# Patient Record
Sex: MALE | Race: BLACK OR AFRICAN AMERICAN | Employment: OTHER | URBAN - METROPOLITAN AREA
[De-identification: names, ages, dates, MRNs, and addresses within clinical notes are randomized per-mention and may not be internally consistent; named-entity substitution may affect disease eponyms.]

---

## 2017-04-11 ENCOUNTER — HOSPITAL ENCOUNTER (OUTPATIENT)
Dept: GENERAL RADIOLOGY | Age: 47
Discharge: HOME | End: 2017-04-11
Payer: COMMERCIAL

## 2017-04-11 ENCOUNTER — HOSPITAL ENCOUNTER (OUTPATIENT)
Age: 47
Discharge: HOME | End: 2017-04-11
Payer: COMMERCIAL

## 2017-04-11 DIAGNOSIS — R52 PAIN: ICD-10-CM

## 2017-04-11 PROCEDURE — 73030 X-RAY EXAM OF SHOULDER: CPT

## 2017-04-11 PROCEDURE — 72040 X-RAY EXAM NECK SPINE 2-3 VW: CPT

## 2018-01-19 ENCOUNTER — HOSPITAL ENCOUNTER (EMERGENCY)
Age: 48
Discharge: HOME | End: 2018-01-19
Attending: EMERGENCY MEDICINE
Payer: COMMERCIAL

## 2018-01-19 ENCOUNTER — APPOINTMENT (OUTPATIENT)
Dept: GENERAL RADIOLOGY | Age: 48
End: 2018-01-19
Payer: COMMERCIAL

## 2018-01-19 VITALS
OXYGEN SATURATION: 100 % | TEMPERATURE: 98 F | BODY MASS INDEX: 27.7 KG/M2 | WEIGHT: 187 LBS | DIASTOLIC BLOOD PRESSURE: 90 MMHG | HEART RATE: 88 BPM | RESPIRATION RATE: 19 BRPM | HEIGHT: 69 IN | SYSTOLIC BLOOD PRESSURE: 124 MMHG

## 2018-01-19 DIAGNOSIS — Z43.1 ATTENTION TO G-TUBE (HCC): Primary | ICD-10-CM

## 2018-01-19 PROCEDURE — 74018 RADEX ABDOMEN 1 VIEW: CPT

## 2018-01-19 PROCEDURE — 43760 HC REPLACE G-TUBE: CPT

## 2018-01-19 PROCEDURE — 99283 EMERGENCY DEPT VISIT LOW MDM: CPT

## 2018-01-19 PROCEDURE — 6360000004 HC RX CONTRAST MEDICATION: Performed by: STUDENT IN AN ORGANIZED HEALTH CARE EDUCATION/TRAINING PROGRAM

## 2018-01-19 RX ADMIN — DIATRIZOATE MEGLUMINE AND DIATRIZOATE SODIUM 30 ML: 660; 100 LIQUID ORAL; RECTAL at 03:13

## 2018-01-19 ASSESSMENT — ENCOUNTER SYMPTOMS
NAUSEA: 0
SINUS PRESSURE: 0
PHOTOPHOBIA: 0
CHEST TIGHTNESS: 0
COUGH: 0
CONSTIPATION: 0
COLOR CHANGE: 0
SINUS PAIN: 0
BLOOD IN STOOL: 0
SORE THROAT: 0
CHOKING: 0
ABDOMINAL DISTENTION: 0
DIARRHEA: 0
WHEEZING: 0
VOMITING: 0
VOICE CHANGE: 0
ABDOMINAL PAIN: 0
SHORTNESS OF BREATH: 0
TROUBLE SWALLOWING: 0

## 2018-01-19 NOTE — ED PROVIDER NOTES
[facility] ED  Emergency Department Encounter  Emergency Medicine Resident     Pt Name: Marcos Banks  MRN: [id number]  [place] 1970  Date of evaluation: 1/19/18  PCP:  No primary care provider on file. Chief Complaint     Chief Complaint   Patient presents with    G Tube Complications     pulled g tube out    Alcohol Intoxication       History of present illness (HPI)  (Location/Symptom, Timing/Onset, Context/Setting, Quality, Duration, Modifying Factors, Severity.)      Marcos Banks is a 52 y.o. male who presented to the emergency department After reportedly finding his G-tube pulled out. Patient denies knowledge of how that happened but stated it did happen within the last hour. Past medical / surgical/ social/ family history      Past Medical Hx:    has a past medical history of Cancer ([facility] [address] Past Surgical Hx:   has a past surgical history that includes Mandible reconstruction. Social Hx:  Social History     Social History    Marital status: Single     Spouse name: N/A    Number of children: N/A    Years of education: N/A     Occupational History    Not on file. Social History Main Topics    Smoking status: Former Smoker     Packs/day: 0.25     Years: 15.00     Quit date: 1/1/2005    Smokeless tobacco: Not on file    Alcohol use Yes      Comment: weekly    Drug use: No      Comment: Past hx    Sexual activity: Yes     Partners: Female     Other Topics Concern    Not on file     Social History Narrative    No narrative on file     Family Hx:  Family History   Problem Relation Age of Onset    High Blood Pressure Mother     Heart Disease Father      Allergies:    No known medication allergies    Review of systems  (2-9 systems for level 4, 10 or more for level 5)      Review of Systems   Constitutional: Negative for activity change, appetite change, fatigue and fever.    HENT: Negative for congestion, sinus pain, sinus pressure, sneezing, sore throat,

## 2018-01-19 NOTE — ED NOTES
Dr. Anusha Patel inserted 18 fr gtube without difficulty. Pt tolerated well.      Eileen Suresh RN  01/19/18 8506

## 2018-01-19 NOTE — ED NOTES
Pt presented to ED with family for the complaint of G tube complication. Pt removed g tube. Pt etoh intoxicated. Pt alert and oriented x 4. RR even and non labored. Pt denies pain.       James Castrejon RN  01/19/18 2257

## 2018-01-19 NOTE — ED PROVIDER NOTES
[facility] ED     Emergency Department     Faculty Attestation        I performed a history and physical examination of the patient and discussed management with the resident. I reviewed the residents note and agree with the documented findings and plan of care. Any areas of disagreement are noted on the chart. I was personally present for the key portions of any procedures. I have documented in the chart those procedures where I was not present during the key portions. I have reviewed the emergency nurses triage note. I agree with the chief complaint, past medical history, past surgical history, allergies, medications, social and family history as documented unless otherwise noted below. For mid-level providers such as nurse practitioners as well as physicians assistants:    I have personally seen and evaluated the patient. I find the patient's history and physical exam are consistent with NP/PA documentation. I agree with the care provided, treatment rendered, disposition, & follow-up plan. Additional findings are as noted. Vital Signs: BP (!) 124/90   Pulse 88   Temp 98 °F (36.7 °C)   Resp 19   Ht 5' 9\" (1.753 m)   Wt 187 lb (84.8 kg)   SpO2 100%   BMI 27.62 kg/m²   PCP:  No primary care provider on file. Pertinent Comments:     G-tube pulled out. This happened approximately an hour prior to arrival.  [facility] replace. Critical Care  None         Note, if the patient's blood pressure was elevated, and they have no history of hypertension, they were informed of the following: The patient may have Pre-hypertension/Hypertension: The patient has been informed that they may have pre-hypertension or Hypertension based on a blood pressure reading in the emergency department.  I recommend that the patient call the primary care provider listed on their discharge instructions or a physician of their choice this week to arrange follow up for further evaluation of possible pre-hypertension or Hypertension. (Please note that portions of this note were completed with a voice recognition program.  Efforts were made to edit the dictations but occasionally words are mis-transcribed. )    Uriel Villagran MD  Attending Emergency Medicine Physician              Mago Rodriges MD  01/19/18 8827

## 2018-03-07 ENCOUNTER — HOSPITAL ENCOUNTER (OUTPATIENT)
Dept: WOMENS IMAGING | Age: 48
Discharge: HOME | End: 2018-03-09
Payer: COMMERCIAL

## 2018-03-07 DIAGNOSIS — N63.0 LUMP OF BREAST: ICD-10-CM

## 2018-03-07 DIAGNOSIS — R92.8 ABNORMAL MAMMOGRAM: ICD-10-CM

## 2018-03-07 PROCEDURE — 76642 ULTRASOUND BREAST LIMITED: CPT

## 2018-03-07 PROCEDURE — 77066 DX MAMMO INCL CAD BI: CPT

## 2018-03-07 PROCEDURE — G0204 DX MAMMO INCL CAD BI: HCPCS

## 2018-09-18 ENCOUNTER — HOSPITAL ENCOUNTER (OUTPATIENT)
Age: 48
Discharge: HOME | End: 2018-09-18
Payer: COMMERCIAL

## 2018-09-18 LAB
-: NORMAL
ABSOLUTE EOS #: <0.03 K/UL (ref 0–0.44)
ABSOLUTE IMMATURE GRANULOCYTE: <0.03 K/UL (ref 0–0.3)
ABSOLUTE LYMPH #: 0.93 K/UL (ref 1.1–3.7)
ABSOLUTE MONO #: 0.29 K/UL (ref 0.1–1.2)
ALBUMIN SERPL-MCNC: 3.9 G/DL (ref 3.5–5.2)
ALBUMIN/GLOBULIN RATIO: 1.3 (ref 1–2.5)
ALP BLD-CCNC: 90 U/L (ref 40–129)
ALT SERPL-CCNC: 14 U/L (ref 5–41)
AMORPHOUS: NORMAL
ANION GAP SERPL CALCULATED.3IONS-SCNC: 14 MMOL/L (ref 9–17)
AST SERPL-CCNC: 25 U/L
BACTERIA: NORMAL
BASOPHILS # BLD: 1 % (ref 0–2)
BASOPHILS ABSOLUTE: <0.03 K/UL (ref 0–0.2)
BILIRUB SERPL-MCNC: 0.5 MG/DL (ref 0.3–1.2)
BILIRUBIN URINE: NEGATIVE
BUN BLDV-MCNC: 12 MG/DL (ref 6–20)
BUN/CREAT BLD: ABNORMAL (ref 9–20)
CA 125: 8 U/ML
CA 19-9: 1 U/ML (ref 0–35)
CALCIUM SERPL-MCNC: 9.3 MG/DL (ref 8.6–10.4)
CARCINOEMBRYONIC ANTIGEN: 5.8 NG/ML
CASTS UA: NORMAL /LPF (ref 0–8)
CHLORIDE BLD-SCNC: 103 MMOL/L (ref 98–107)
CHOLESTEROL/HDL RATIO: 3.6
CHOLESTEROL: 134 MG/DL
CO2: 22 MMOL/L (ref 20–31)
COLOR: YELLOW
COMMENT UA: ABNORMAL
CREAT SERPL-MCNC: 0.52 MG/DL (ref 0.7–1.2)
CRYSTALS, UA: NORMAL /HPF
DIFFERENTIAL TYPE: ABNORMAL
EOSINOPHILS RELATIVE PERCENT: 0 % (ref 1–4)
EPITHELIAL CELLS UA: NORMAL /HPF (ref 0–5)
ESTIMATED AVERAGE GLUCOSE: 91 MG/DL
GFR AFRICAN AMERICAN: >60 ML/MIN
GFR NON-AFRICAN AMERICAN: >60 ML/MIN
GFR SERPL CREATININE-BSD FRML MDRD: ABNORMAL ML/MIN/{1.73_M2}
GFR SERPL CREATININE-BSD FRML MDRD: ABNORMAL ML/MIN/{1.73_M2}
GLUCOSE BLD-MCNC: 89 MG/DL (ref 70–99)
GLUCOSE URINE: NEGATIVE
HBA1C MFR BLD: 4.8 % (ref 4–6)
HCT VFR BLD CALC: 44.8 % (ref 40.7–50.3)
HDLC SERPL-MCNC: 37 MG/DL
HEMOGLOBIN: 14.3 G/DL (ref 13–17)
HIV AG/AB: NONREACTIVE
IMMATURE GRANULOCYTES: 0 %
KETONES, URINE: NEGATIVE
LDL CHOLESTEROL: 77 MG/DL (ref 0–130)
LEUKOCYTE ESTERASE, URINE: NEGATIVE
LYMPHOCYTES # BLD: 25 % (ref 24–43)
MCH RBC QN AUTO: 32.9 PG (ref 25.2–33.5)
MCHC RBC AUTO-ENTMCNC: 31.9 G/DL (ref 28.4–34.8)
MCV RBC AUTO: 103.2 FL (ref 82.6–102.9)
MONOCYTES # BLD: 8 % (ref 3–12)
MUCUS: NORMAL
NITRITE, URINE: NEGATIVE
NRBC AUTOMATED: 0 PER 100 WBC
OTHER OBSERVATIONS UA: NORMAL
PDW BLD-RTO: 11.1 % (ref 11.8–14.4)
PH UA: >9 (ref 5–8)
PLATELET # BLD: 239 K/UL (ref 138–453)
PLATELET ESTIMATE: ABNORMAL
PMV BLD AUTO: 10.5 FL (ref 8.1–13.5)
POTASSIUM SERPL-SCNC: 4.3 MMOL/L (ref 3.7–5.3)
PROSTATE SPECIFIC ANTIGEN: 1.35 UG/L
PROTEIN UA: ABNORMAL
RBC # BLD: 4.34 M/UL (ref 4.21–5.77)
RBC # BLD: ABNORMAL 10*6/UL
RBC UA: NORMAL /HPF (ref 0–4)
RENAL EPITHELIAL, UA: NORMAL /HPF
SEG NEUTROPHILS: 66 % (ref 36–65)
SEGMENTED NEUTROPHILS ABSOLUTE COUNT: 2.45 K/UL (ref 1.5–8.1)
SODIUM BLD-SCNC: 139 MMOL/L (ref 135–144)
SPECIFIC GRAVITY UA: 1.02 (ref 1–1.03)
T. PALLIDUM, IGG: NONREACTIVE
THYROXINE, FREE: 1.1 NG/DL (ref 0.93–1.7)
TOTAL PROTEIN: 7 G/DL (ref 6.4–8.3)
TRICHOMONAS: NORMAL
TRIGL SERPL-MCNC: 102 MG/DL
TSH SERPL DL<=0.05 MIU/L-ACNC: 0.9 MIU/L (ref 0.3–5)
TURBIDITY: CLEAR
URINE HGB: NEGATIVE
UROBILINOGEN, URINE: NORMAL
VITAMIN D 25-HYDROXY: 30.3 NG/ML (ref 30–100)
VLDLC SERPL CALC-MCNC: ABNORMAL MG/DL (ref 1–30)
WBC # BLD: 3.7 K/UL (ref 3.5–11.3)
WBC # BLD: ABNORMAL 10*3/UL
WBC UA: NORMAL /HPF (ref 0–5)
YEAST: NORMAL

## 2018-09-18 PROCEDURE — 86304 IMMUNOASSAY TUMOR CA 125: CPT

## 2018-09-18 PROCEDURE — 87491 CHLMYD TRACH DNA AMP PROBE: CPT

## 2018-09-18 PROCEDURE — 86780 TREPONEMA PALLIDUM: CPT

## 2018-09-18 PROCEDURE — 87389 HIV-1 AG W/HIV-1&-2 AB AG IA: CPT

## 2018-09-18 PROCEDURE — 81001 URINALYSIS AUTO W/SCOPE: CPT

## 2018-09-18 PROCEDURE — 80053 COMPREHEN METABOLIC PANEL: CPT

## 2018-09-18 PROCEDURE — 80074 ACUTE HEPATITIS PANEL: CPT

## 2018-09-18 PROCEDURE — 82378 CARCINOEMBRYONIC ANTIGEN: CPT

## 2018-09-18 PROCEDURE — 84443 ASSAY THYROID STIM HORMONE: CPT

## 2018-09-18 PROCEDURE — 86301 IMMUNOASSAY TUMOR CA 19-9: CPT

## 2018-09-18 PROCEDURE — 83036 HEMOGLOBIN GLYCOSYLATED A1C: CPT

## 2018-09-18 PROCEDURE — 84439 ASSAY OF FREE THYROXINE: CPT

## 2018-09-18 PROCEDURE — 36415 COLL VENOUS BLD VENIPUNCTURE: CPT

## 2018-09-18 PROCEDURE — 82306 VITAMIN D 25 HYDROXY: CPT

## 2018-09-18 PROCEDURE — 80061 LIPID PANEL: CPT

## 2018-09-18 PROCEDURE — 86140 C-REACTIVE PROTEIN: CPT

## 2018-09-18 PROCEDURE — 85025 COMPLETE CBC W/AUTO DIFF WBC: CPT

## 2018-09-18 PROCEDURE — G0103 PSA SCREENING: HCPCS

## 2018-09-18 PROCEDURE — 87591 N.GONORRHOEAE DNA AMP PROB: CPT

## 2018-09-19 LAB
C-REACTIVE PROTEIN: 3.1 MG/L (ref 0–5)
C. TRACHOMATIS DNA ,URINE: NEGATIVE
HAV IGM SER IA-ACNC: ABNORMAL
HEPATITIS B CORE IGM ANTIBODY: NONREACTIVE
HEPATITIS B SURFACE ANTIGEN: NONREACTIVE
HEPATITIS C ANTIBODY: NONREACTIVE
N. GONORRHOEAE DNA, URINE: NEGATIVE

## 2018-09-21 LAB
SEND OUT REPORT: NORMAL
TEST NAME: NORMAL

## 2018-10-17 ENCOUNTER — TELEPHONE (OUTPATIENT)
Dept: GASTROENTEROLOGY | Age: 48
End: 2018-10-17

## 2018-10-18 NOTE — TELEPHONE ENCOUNTER
Call patient , to let him know that we can not see him for Screening Colonoscopy due to his age, patient states he has abnormal lab and abd. Pain. I call his PCP office at [facility] to get new referral . L/m . With MA.    He has appt with Dr. Audra Alarcon 11/19/18

## 2018-11-04 ENCOUNTER — HOSPITAL ENCOUNTER (EMERGENCY)
Age: 48
Discharge: HOME | End: 2018-11-04
Attending: EMERGENCY MEDICINE
Payer: COMMERCIAL

## 2018-11-04 VITALS
WEIGHT: 170 LBS | HEIGHT: 68 IN | SYSTOLIC BLOOD PRESSURE: 96 MMHG | RESPIRATION RATE: 16 BRPM | HEART RATE: 98 BPM | OXYGEN SATURATION: 98 % | TEMPERATURE: 97.3 F | BODY MASS INDEX: 25.76 KG/M2 | DIASTOLIC BLOOD PRESSURE: 59 MMHG

## 2018-11-04 DIAGNOSIS — S76.212A STRAIN OF ADDUCTOR MUSCLE, FASCIA AND TENDON OF LEFT THIGH, INITIAL ENCOUNTER: Primary | ICD-10-CM

## 2018-11-04 PROCEDURE — 99283 EMERGENCY DEPT VISIT LOW MDM: CPT

## 2018-11-04 RX ORDER — CYCLOBENZAPRINE HCL 10 MG
10 TABLET ORAL 3 TIMES DAILY PRN
Qty: 21 TABLET | Refills: 0 | Status: SHIPPED | OUTPATIENT
Start: 2018-11-04 | End: 2018-11-11

## 2018-11-04 RX ORDER — CYCLOBENZAPRINE HCL 10 MG
10 TABLET ORAL 3 TIMES DAILY PRN
Status: DISCONTINUED | OUTPATIENT
Start: 2018-11-04 | End: 2018-11-04

## 2018-11-04 ASSESSMENT — ENCOUNTER SYMPTOMS
ABDOMINAL PAIN: 0
DIARRHEA: 0
HEARTBURN: 0
NAUSEA: 0
VOMITING: 0
BACK PAIN: 0
HEMOPTYSIS: 0
CONSTIPATION: 0
SPUTUM PRODUCTION: 0
BLURRED VISION: 0
PHOTOPHOBIA: 0
COUGH: 0
DOUBLE VISION: 0

## 2018-11-04 ASSESSMENT — PAIN SCALES - GENERAL: PAINLEVEL_OUTOF10: 8

## 2018-11-04 ASSESSMENT — PAIN DESCRIPTION - LOCATION: LOCATION: LEG

## 2018-11-04 ASSESSMENT — PAIN DESCRIPTION - ORIENTATION: ORIENTATION: LEFT;UPPER

## 2018-11-04 ASSESSMENT — PAIN DESCRIPTION - PAIN TYPE: TYPE: ACUTE PAIN

## 2018-11-04 NOTE — ED PROVIDER NOTES
[facility] [facility]     Emergency Department     Faculty Attestation    I performed a history and physical examination of the patient and discussed management with the resident. I reviewed the residents note and agree with the documented findings and plan of care. Any areas of disagreement are noted on the chart. I was personally present for the key portions of any procedures. I have documented in the chart those procedures where I was not present during the key portions. I have reviewed the emergency nurses triage note. I agree with the chief complaint, past medical history, past surgical history, allergies, medications, social and family history as documented unless otherwise noted below. For Physician Assistant/ Nurse Practitioner cases/documentation I have personally evaluated this patient and have completed at least one if not all key elements of the E/M (history, physical exam, and MDM). Additional findings are as noted. Patient presents complaining of pain to his medial thigh that he has had for the past 4 or 5 days. He denies any specific injuries to the area. He denies any pain to the scrotum. He denies fever, chills, abdominal pain, nausea, vomiting. He denies any difficulties using the restroom. Patient denies any back pain. 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 Patient says the pain is worse with certain movements of the legs and at times the pain will shoot down the leg. He denies weakness or numbness to the leg. On exam, patient is resting completely the bed and appears well. Abdomen is soft and nontender. There is no tenderness to the scrotum. There is mild tenderness to palpation of the superior left medial thigh. No deformity, swelling, erythema, warmth, rash. Strength and sensation is intact to the lower extremity. Pulses are intact. I do not feel that imaging is indicated at this time.   We'll
interpreted by the Emergency Department Physician who either signs or Co-signs this chart in the absence of a cardiologist.  Not done    RADIOLOGY:   I directly visualized the following  images and reviewed the radiologist interpretations:  No orders to display           ED BEDSIDE ULTRASOUND:   none    LABS:  Labs Reviewed - No data to display    none      EMERGENCY DEPARTMENT COURSE:   Vitals:    Vitals:    11/04/18 0849 11/04/18 0854   BP: (!) 96/59    Pulse: 98    Resp: 16    Temp: 97.3 °F (36.3 °C)    SpO2: 98%    Weight:  170 lb (77.1 kg)   Height:  5' 8\" (1.727 m)         CRITICAL CARE:  none    CONSULTS:  None      PROCEDURES:  Procedures      FINAL IMPRESSION      1.  Strain of adductor muscle, fascia and tendon of left thigh, initial encounter            DISPOSITION/PLAN   DISPOSITION Decision To Discharge 11/04/2018 09:21:27 AM          PATIENT REFERRED TO:  [facility] [place] [facility] ED  [address] [place] [address]  [phone]    If symptoms worsen      DISCHARGE MEDICATIONS:  New Prescriptions    CYCLOBENZAPRINE (FLEXERIL) 10 MG TABLET    Take 1 tablet by mouth 3 times daily as needed for Muscle spasms       (Please note that portions of this note were completed with a voice recognition program.  Efforts were made to edit the dictations but occasionally words are mis-transcribed.)    Blanca Cuevas  Emergency Medicine Resident           Blacna Cuevas MD  Resident  11/04/18 0860

## 2018-11-19 ENCOUNTER — OFFICE VISIT (OUTPATIENT)
Dept: GASTROENTEROLOGY | Age: 48
End: 2018-11-19
Payer: COMMERCIAL

## 2018-11-19 VITALS
SYSTOLIC BLOOD PRESSURE: 115 MMHG | WEIGHT: 160.7 LBS | DIASTOLIC BLOOD PRESSURE: 74 MMHG | HEART RATE: 63 BPM | BODY MASS INDEX: 24.43 KG/M2

## 2018-11-19 DIAGNOSIS — Z12.11 COLON CANCER SCREENING: ICD-10-CM

## 2018-11-19 DIAGNOSIS — R63.4 WEIGHT LOSS: Primary | ICD-10-CM

## 2018-11-19 PROCEDURE — G8420 CALC BMI NORM PARAMETERS: HCPCS | Performed by: INTERNAL MEDICINE

## 2018-11-19 PROCEDURE — G8427 DOCREV CUR MEDS BY ELIG CLIN: HCPCS | Performed by: INTERNAL MEDICINE

## 2018-11-19 PROCEDURE — G8484 FLU IMMUNIZE NO ADMIN: HCPCS | Performed by: INTERNAL MEDICINE

## 2018-11-19 PROCEDURE — 99244 OFF/OP CNSLTJ NEW/EST MOD 40: CPT | Performed by: INTERNAL MEDICINE

## 2018-11-19 RX ORDER — GABAPENTIN 300 MG/1
300 CAPSULE ORAL 2 TIMES DAILY
COMMUNITY
End: 2019-07-08

## 2018-11-19 RX ORDER — IBUPROFEN 800 MG/1
800 TABLET ORAL 3 TIMES DAILY PRN
COMMUNITY
End: 2019-07-08

## 2018-11-19 RX ORDER — CYCLOBENZAPRINE HCL 10 MG
10 TABLET ORAL 3 TIMES DAILY PRN
COMMUNITY
End: 2019-07-08

## 2018-11-19 RX ORDER — ATORVASTATIN CALCIUM 10 MG/1
10 TABLET, FILM COATED ORAL DAILY
COMMUNITY
End: 2019-07-08

## 2018-11-19 RX ORDER — POLYETHYLENE GLYCOL 3350 17 G/17G
POWDER, FOR SOLUTION ORAL
Qty: 255 G | Refills: 0 | Status: SHIPPED | OUTPATIENT
Start: 2018-11-19 | End: 2018-12-19

## 2018-11-19 ASSESSMENT — ENCOUNTER SYMPTOMS
DIARRHEA: 0
ABDOMINAL DISTENTION: 0
EYES NEGATIVE: 1
RESPIRATORY NEGATIVE: 1
VOMITING: 0
ANAL BLEEDING: 0
CONSTIPATION: 0
BLOOD IN STOOL: 0
ABDOMINAL PAIN: 0
RECTAL PAIN: 0
NAUSEA: 0
TROUBLE SWALLOWING: 1

## 2018-11-20 ENCOUNTER — TELEPHONE (OUTPATIENT)
Dept: GASTROENTEROLOGY | Age: 48
End: 2018-11-20

## 2018-12-19 PROBLEM — Z12.11 COLON CANCER SCREENING: Status: RESOLVED | Noted: 2018-11-19 | Resolved: 2018-12-19

## 2019-01-08 DIAGNOSIS — Z12.11 COLON CANCER SCREENING: ICD-10-CM

## 2019-01-09 ENCOUNTER — OFFICE VISIT (OUTPATIENT)
Dept: GASTROENTEROLOGY | Age: 49
End: 2019-01-09
Payer: COMMERCIAL

## 2019-01-09 VITALS
BODY MASS INDEX: 24.61 KG/M2 | DIASTOLIC BLOOD PRESSURE: 65 MMHG | HEART RATE: 69 BPM | SYSTOLIC BLOOD PRESSURE: 102 MMHG | WEIGHT: 162.4 LBS | HEIGHT: 68 IN

## 2019-01-09 DIAGNOSIS — Z85.9 HISTORY OF MALIGNANT NEOPLASM: Primary | ICD-10-CM

## 2019-01-09 DIAGNOSIS — C04.9: ICD-10-CM

## 2019-01-09 DIAGNOSIS — C80.1 MALIGNANT NEOPLASTIC DISEASE (HCC): ICD-10-CM

## 2019-01-09 DIAGNOSIS — D36.9 TUBULAR ADENOMA: ICD-10-CM

## 2019-01-09 DIAGNOSIS — D17.30 LIPOMA OF SKIN AND SUBCUTANEOUS TISSUE (EXCLUDING FACE): ICD-10-CM

## 2019-01-09 PROCEDURE — G8427 DOCREV CUR MEDS BY ELIG CLIN: HCPCS | Performed by: INTERNAL MEDICINE

## 2019-01-09 PROCEDURE — 99213 OFFICE O/P EST LOW 20 MIN: CPT | Performed by: INTERNAL MEDICINE

## 2019-01-09 PROCEDURE — 1036F TOBACCO NON-USER: CPT | Performed by: INTERNAL MEDICINE

## 2019-01-09 PROCEDURE — G8484 FLU IMMUNIZE NO ADMIN: HCPCS | Performed by: INTERNAL MEDICINE

## 2019-01-09 PROCEDURE — G8420 CALC BMI NORM PARAMETERS: HCPCS | Performed by: INTERNAL MEDICINE

## 2019-01-09 ASSESSMENT — ENCOUNTER SYMPTOMS
SINUS PRESSURE: 0
SINUS PAIN: 0
SORE THROAT: 0
DIARRHEA: 0
WHEEZING: 0
BACK PAIN: 1
EYE PAIN: 0
ANAL BLEEDING: 0
VOMITING: 0
ABDOMINAL PAIN: 0
EYE REDNESS: 0
TROUBLE SWALLOWING: 1
CHEST TIGHTNESS: 0
NAUSEA: 0
RECTAL PAIN: 0
BLOOD IN STOOL: 0
ABDOMINAL DISTENTION: 0
SHORTNESS OF BREATH: 0
COLOR CHANGE: 0
CONSTIPATION: 1

## 2019-04-03 ENCOUNTER — APPOINTMENT (OUTPATIENT)
Dept: GENERAL RADIOLOGY | Age: 49
End: 2019-04-03
Payer: COMMERCIAL

## 2019-04-03 ENCOUNTER — HOSPITAL ENCOUNTER (EMERGENCY)
Age: 49
Discharge: HOME | End: 2019-04-03
Attending: EMERGENCY MEDICINE
Payer: COMMERCIAL

## 2019-04-03 VITALS
SYSTOLIC BLOOD PRESSURE: 104 MMHG | HEART RATE: 99 BPM | RESPIRATION RATE: 18 BRPM | OXYGEN SATURATION: 100 % | BODY MASS INDEX: 24.33 KG/M2 | DIASTOLIC BLOOD PRESSURE: 77 MMHG | WEIGHT: 160 LBS | TEMPERATURE: 97.5 F

## 2019-04-03 DIAGNOSIS — K94.23 GASTROSTOMY TUBE DYSFUNCTION (HCC): Primary | ICD-10-CM

## 2019-04-03 PROCEDURE — 99283 EMERGENCY DEPT VISIT LOW MDM: CPT

## 2019-04-03 PROCEDURE — 43762 RPLC GTUBE NO REVJ TRC: CPT

## 2019-04-03 PROCEDURE — 6360000004 HC RX CONTRAST MEDICATION: Performed by: EMERGENCY MEDICINE

## 2019-04-03 PROCEDURE — 74018 RADEX ABDOMEN 1 VIEW: CPT

## 2019-04-03 RX ADMIN — DIATRIZOATE MEGLUMINE AND DIATRIZOATE SODIUM 30 ML: 660; 100 LIQUID ORAL; RECTAL at 03:47

## 2019-04-03 ASSESSMENT — ENCOUNTER SYMPTOMS
NAUSEA: 0
SHORTNESS OF BREATH: 0
SORE THROAT: 0
VOMITING: 0
EYE REDNESS: 0
CHEST TIGHTNESS: 0
BLOOD IN STOOL: 0
COUGH: 0
RHINORRHEA: 0
ABDOMINAL PAIN: 0
EYE DISCHARGE: 0
DIARRHEA: 0

## 2019-04-03 NOTE — ED PROVIDER NOTES
[address]  ED  Emergency Department Encounter  EmergencyMedicine Resident     Pt Braxton Moreno  MRN: [id number]  [place] 1970  Date of evaluation: 4/3/19  PCP:  QIAN Munoz CNP    CHIEF COMPLAINT       Chief Complaint   Patient presents with    Feeding Tube Problem       HISTORY OF PRESENT ILLNESS  (Location/Symptom, Timing/Onset, Context/Setting, Quality, Duration, Modifying Factors, Severity.)      Faraz King is a 50 y.o. male who presents with complaints that his G-tube fell out accident 30 minutes prior to arrival.  Patient denies any other complaints at this time. Significant past medical history for mandibular cancer. Area around the G-tube insertion site appears normal and clean, with no evidence for cellulitis/infection. Patient denies any headache or change in vision, no nausea or vomiting, no fevers or chills, no chest pain/shortness of breath, no abdominal pain, and no  symptoms including no hematuria or blood in stool, as well as no dysuria. PAST MEDICAL / SURGICAL / SOCIAL / FAMILY HISTORY      has a past medical history of Cancer [facility]), Colon cancer screening, Colon polyps, and Uses feeding tube. has a past surgical history that includes Mandible reconstruction and Colonoscopy (2018).     Social History     Socioeconomic History    Marital status: Single     Spouse name: Not on file    Number of children: Not on file    Years of education: Not on file    Highest education level: Not on file   Occupational History    Not on file   Social Needs    Financial resource strain: Not on file    Food insecurity:     Worry: Not on file     Inability: Not on file    Transportation needs:     Medical: Not on file     Non-medical: Not on file   Tobacco Use    Smoking status: Former Smoker     Packs/day: 0.25     Years: 15.00     Pack years: 3.75     Last attempt to quit: 2005     Years since quittin.2    Smokeless tobacco: Never deficit. Skin: Skin is warm and dry. Capillary refill takes less than 2 seconds. No rash noted. Psychiatric: He has a normal mood and affect. DIFFERENTIAL  DIAGNOSIS     PLAN (LABS / IMAGING / EKG):  Orders Placed This Encounter   Procedures    XR ABDOMEN FOR NG/OG/NE TUBE PLACEMENT       MEDICATIONS ORDERED:  Orders Placed This Encounter   Medications    DISCONTD: diatrizoate meglumine-sodium (GASTROGRAFIN) 66-10 % solution 15 mL    diatrizoate meglumine-sodium (GASTROGRAFIN) 66-10 % solution 30 mL       DDX: G-tube dislodgment    DIAGNOSTIC RESULTS / EMERGENCY DEPARTMENT COURSE / MDM     LABS:  No results found for this visit on 04/03/19. IMPRESSION/EMERGENCY DEPARTMENT COURSE:        ED Course as of Apr 03 0637   Wed Apr 03, 2019   0240 Patient states the G-tube came out about 30 min prior to arrival. Will replace G-tube, and obtain KUB with gastrographin to verify placement. Pt non-tender, and has no other complaints. [JM]      ED Course User Index  [JM] Arleene Danger, DO       G-tube replaced, x-ray imaging demonstrated appropriate placement. Patient stable for discharge home. Discussed that he needs to follow up his primary care physician or return to the emergency department symptoms worsen or new onset of symptoms occurs. Patient agreeable to plan is stable for discharge. Patient instructed to return to the Emergency Department if symptoms worsen or new onset of symptoms occurs. If patient did not have a Primary Care physician, they were given contact information to contact [facility] to setup as a new patient, for continued care and treatment. Discussed findings of laboratory workup and imaging, if applicable. All questions and concerns were answered, and patient offered no additional complaints or concerns.  Return precautions were given to patient, patient acknowledged understanding of return precautions and was able to relay signs and symptoms back that would need

## 2019-04-03 NOTE — ED PROVIDER NOTES
[facility]     Emergency Department     Faculty Attestation    I performed a history and physical examination of the patient and discussed management with the resident. I have reviewed and agree with the residents findings including all diagnostic interpretations, and treatment plans as written. Any areas of disagreement are noted on the chart. I was personally present for the key portions of any procedures. I have documented in the chart those procedures where I was not present during the key portions. I have reviewed the emergency nurses triage note. I agree with the chief complaint, past medical history, past surgical history, allergies, medications, social and family history as documented unless otherwise noted below. Documentation of the HPI, Physical Exam and Medical Decision Making performed by rasta is based on my personal performance of the HPI, PE and MDM. For Physician Assistant/ Nurse Practitioner cases/documentation I have personally evaluated this patient and have completed at least one if not all key elements of the E/M (history, physical exam, and MDM). Additional findings are as noted. 51 yo M g tube displaced just prior to arrival, no fever no pain, pe abdomen non tender no mass no distension no rigidity, luq g tube site clean intact no bleeding,     --g tube placed per Dr Mario Mcdonough I was present  xr >> g tube in place, pt discharged, tolerated well,       Pre-hypertension/Hypertension: The patient has been informed that they may have pre-hypertension or Hypertension based on a blood pressure reading in the emergency department. I recommend that the patient call the primary care provider listed on their discharge instructions or a physician of their choice this week to arrange follow up for further evaluation of possible pre-hypertension or Hypertension.       EKG Interpretation    Interpreted by me      CRITICAL CARE: There was a high

## 2019-05-16 ENCOUNTER — HOSPITAL ENCOUNTER (OUTPATIENT)
Age: 49
Discharge: HOME | End: 2019-05-16
Payer: COMMERCIAL

## 2019-05-16 LAB
ABSOLUTE EOS #: <0.03 K/UL (ref 0–0.44)
ABSOLUTE IMMATURE GRANULOCYTE: <0.03 K/UL (ref 0–0.3)
ABSOLUTE LYMPH #: 1.2 K/UL (ref 1.1–3.7)
ABSOLUTE MONO #: 0.39 K/UL (ref 0.1–1.2)
ALBUMIN SERPL-MCNC: 4.4 G/DL (ref 3.5–5.2)
ALBUMIN/GLOBULIN RATIO: 1.4 (ref 1–2.5)
ALP BLD-CCNC: 85 U/L (ref 40–129)
ALT SERPL-CCNC: 16 U/L (ref 5–41)
ANION GAP SERPL CALCULATED.3IONS-SCNC: 13 MMOL/L (ref 9–17)
AST SERPL-CCNC: 27 U/L
BASOPHILS # BLD: 1 % (ref 0–2)
BASOPHILS ABSOLUTE: <0.03 K/UL (ref 0–0.2)
BILIRUB SERPL-MCNC: 0.45 MG/DL (ref 0.3–1.2)
BUN BLDV-MCNC: 17 MG/DL (ref 6–20)
BUN/CREAT BLD: ABNORMAL (ref 9–20)
CALCIUM SERPL-MCNC: 9.3 MG/DL (ref 8.6–10.4)
CHLORIDE BLD-SCNC: 104 MMOL/L (ref 98–107)
CHOLESTEROL, FASTING: 143 MG/DL
CHOLESTEROL/HDL RATIO: 4.3
CO2: 26 MMOL/L (ref 20–31)
CREAT SERPL-MCNC: 0.47 MG/DL (ref 0.7–1.2)
DIFFERENTIAL TYPE: ABNORMAL
EOSINOPHILS RELATIVE PERCENT: 1 % (ref 1–4)
ESTIMATED AVERAGE GLUCOSE: 85 MG/DL
GFR AFRICAN AMERICAN: >60 ML/MIN
GFR NON-AFRICAN AMERICAN: >60 ML/MIN
GFR SERPL CREATININE-BSD FRML MDRD: ABNORMAL ML/MIN/{1.73_M2}
GFR SERPL CREATININE-BSD FRML MDRD: ABNORMAL ML/MIN/{1.73_M2}
GLUCOSE BLD-MCNC: 79 MG/DL (ref 70–99)
HAV IGM SER IA-ACNC: NONREACTIVE
HBA1C MFR BLD: 4.6 % (ref 4–6)
HCT VFR BLD CALC: 44.3 % (ref 40.7–50.3)
HDLC SERPL-MCNC: 33 MG/DL
HEMOGLOBIN: 14.6 G/DL (ref 13–17)
HEPATITIS B CORE IGM ANTIBODY: NONREACTIVE
HEPATITIS B SURFACE ANTIGEN: NONREACTIVE
HEPATITIS C ANTIBODY: NONREACTIVE
HIV AG/AB: NONREACTIVE
IMMATURE GRANULOCYTES: 1 %
LDL CHOLESTEROL: 69 MG/DL (ref 0–130)
LYMPHOCYTES # BLD: 28 % (ref 24–43)
MCH RBC QN AUTO: 32.8 PG (ref 25.2–33.5)
MCHC RBC AUTO-ENTMCNC: 33 G/DL (ref 28.4–34.8)
MCV RBC AUTO: 99.6 FL (ref 82.6–102.9)
MONOCYTES # BLD: 9 % (ref 3–12)
NRBC AUTOMATED: 0 PER 100 WBC
PDW BLD-RTO: 11.3 % (ref 11.8–14.4)
PLATELET # BLD: 293 K/UL (ref 138–453)
PLATELET ESTIMATE: ABNORMAL
PMV BLD AUTO: 10.7 FL (ref 8.1–13.5)
POTASSIUM SERPL-SCNC: 4.5 MMOL/L (ref 3.7–5.3)
RBC # BLD: 4.45 M/UL (ref 4.21–5.77)
RBC # BLD: ABNORMAL 10*6/UL
SEG NEUTROPHILS: 60 % (ref 36–65)
SEGMENTED NEUTROPHILS ABSOLUTE COUNT: 2.65 K/UL (ref 1.5–8.1)
SODIUM BLD-SCNC: 143 MMOL/L (ref 135–144)
T. PALLIDUM, IGG: NONREACTIVE
TOTAL PROTEIN: 7.6 G/DL (ref 6.4–8.3)
TRIGLYCERIDE, FASTING: 206 MG/DL
VLDLC SERPL CALC-MCNC: ABNORMAL MG/DL (ref 1–30)
WBC # BLD: 4.3 K/UL (ref 3.5–11.3)
WBC # BLD: ABNORMAL 10*3/UL

## 2019-05-16 PROCEDURE — 80074 ACUTE HEPATITIS PANEL: CPT

## 2019-05-16 PROCEDURE — 87389 HIV-1 AG W/HIV-1&-2 AB AG IA: CPT

## 2019-05-16 PROCEDURE — 86696 HERPES SIMPLEX TYPE 2 TEST: CPT

## 2019-05-16 PROCEDURE — 85025 COMPLETE CBC W/AUTO DIFF WBC: CPT

## 2019-05-16 PROCEDURE — 83036 HEMOGLOBIN GLYCOSYLATED A1C: CPT

## 2019-05-16 PROCEDURE — 80053 COMPREHEN METABOLIC PANEL: CPT

## 2019-05-16 PROCEDURE — 80061 LIPID PANEL: CPT

## 2019-05-16 PROCEDURE — 86780 TREPONEMA PALLIDUM: CPT

## 2019-05-16 PROCEDURE — 36415 COLL VENOUS BLD VENIPUNCTURE: CPT

## 2019-05-16 PROCEDURE — 86694 HERPES SIMPLEX NES ANTBDY: CPT

## 2019-05-16 PROCEDURE — 86695 HERPES SIMPLEX TYPE 1 TEST: CPT

## 2019-05-21 LAB
HERPES SIMPLEX VIRUS 1 IGG: 4.4
HERPES SIMPLEX VIRUS 2 IGG: 4.27
HERPES TYPE 1/2 IGM COMBINED: 0.58

## 2019-07-08 ENCOUNTER — HOSPITAL ENCOUNTER (EMERGENCY)
Age: 49
Discharge: HOME | End: 2019-07-08
Attending: EMERGENCY MEDICINE
Payer: COMMERCIAL

## 2019-07-08 VITALS
WEIGHT: 165 LBS | HEART RATE: 74 BPM | TEMPERATURE: 98.7 F | RESPIRATION RATE: 18 BRPM | BODY MASS INDEX: 25.9 KG/M2 | DIASTOLIC BLOOD PRESSURE: 83 MMHG | OXYGEN SATURATION: 100 % | SYSTOLIC BLOOD PRESSURE: 128 MMHG | HEIGHT: 67 IN

## 2019-07-08 DIAGNOSIS — L03.011 PARONYCHIA OF FINGER OF RIGHT HAND: Primary | ICD-10-CM

## 2019-07-08 PROCEDURE — 99282 EMERGENCY DEPT VISIT SF MDM: CPT

## 2019-07-08 PROCEDURE — 10060 I&D ABSCESS SIMPLE/SINGLE: CPT

## 2019-07-08 PROCEDURE — 2500000003 HC RX 250 WO HCPCS: Performed by: NURSE PRACTITIONER

## 2019-07-08 PROCEDURE — 6370000000 HC RX 637 (ALT 250 FOR IP): Performed by: NURSE PRACTITIONER

## 2019-07-08 RX ORDER — CEPHALEXIN 250 MG/5ML
500 POWDER, FOR SUSPENSION ORAL ONCE
Status: COMPLETED | OUTPATIENT
Start: 2019-07-08 | End: 2019-07-08

## 2019-07-08 RX ORDER — CEPHALEXIN 500 MG/1
500 CAPSULE ORAL 4 TIMES DAILY
Qty: 28 CAPSULE | Refills: 0 | Status: SHIPPED | OUTPATIENT
Start: 2019-07-08 | End: 2019-07-08 | Stop reason: ALTCHOICE

## 2019-07-08 RX ORDER — CEPHALEXIN 250 MG/5ML
500 POWDER, FOR SUSPENSION ORAL 4 TIMES DAILY
Qty: 400 ML | Refills: 0 | Status: SHIPPED | OUTPATIENT
Start: 2019-07-08 | End: 2019-07-18

## 2019-07-08 RX ORDER — LIDOCAINE HYDROCHLORIDE 10 MG/ML
20 INJECTION, SOLUTION INFILTRATION; PERINEURAL ONCE
Status: COMPLETED | OUTPATIENT
Start: 2019-07-08 | End: 2019-07-08

## 2019-07-08 RX ORDER — CEPHALEXIN 250 MG/1
500 CAPSULE ORAL ONCE
Status: DISCONTINUED | OUTPATIENT
Start: 2019-07-08 | End: 2019-07-08

## 2019-07-08 RX ADMIN — CEPHALEXIN 500 MG: 250 POWDER, FOR SUSPENSION ORAL at 18:13

## 2019-07-08 RX ADMIN — LIDOCAINE HYDROCHLORIDE 20 ML: 10 INJECTION, SOLUTION INFILTRATION; PERINEURAL at 17:06

## 2019-07-08 ASSESSMENT — ENCOUNTER SYMPTOMS
SHORTNESS OF BREATH: 0
CHEST TIGHTNESS: 0
VOMITING: 0
ABDOMINAL PAIN: 0
NAUSEA: 0

## 2019-07-08 ASSESSMENT — PAIN DESCRIPTION - DESCRIPTORS: DESCRIPTORS: ACHING

## 2019-07-08 ASSESSMENT — PAIN SCALES - GENERAL
PAINLEVEL_OUTOF10: 10
PAINLEVEL_OUTOF10: 10

## 2019-07-08 ASSESSMENT — PAIN DESCRIPTION - LOCATION: LOCATION: FINGER (COMMENT WHICH ONE)

## 2019-07-08 ASSESSMENT — PAIN DESCRIPTION - ORIENTATION: ORIENTATION: RIGHT

## 2019-07-08 NOTE — ED PROVIDER NOTES
Yes     Types: Marijuana     Comment: Past hx    Sexual activity: Yes     Partners: Female   Lifestyle    Physical activity:     Days per week: Not on file     Minutes per session: Not on file    Stress: Not on file   Relationships    Social connections:     Talks on phone: Not on file     Gets together: Not on file     Attends Judaism service: Not on file     Active member of club or organization: Not on file     Attends meetings of clubs or organizations: Not on file     Relationship status: Not on file    Intimate partner violence:     Fear of current or ex partner: Not on file     Emotionally abused: Not on file     Physically abused: Not on file     Forced sexual activity: Not on file   Other Topics Concern    Not on file   Social History Narrative    Not on file       Family History   Problem Relation Age of Onset    High Blood Pressure Mother     Heart Disease Father        Allergies:  No known allergies    Home Medications:  Prior to Admission medications    Medication Sig Start Date End Date Taking? Authorizing Provider   cephALEXin (KEFLEX) 250 MG/5ML suspension 10 mLs by Per G Tube route 4 times daily for 10 days 7/8/19 7/18/19 Yes Iris Saenz, APRN - CNP       patient's medication list has been reviewed as entered by the nursing staff. REVIEW OF SYSTEMS    (2-9 systems for level 4, 10 or more for level 5)      Review of Systems   Constitutional: Negative for chills, diaphoresis, fatigue and fever. Respiratory: Negative for chest tightness and shortness of breath. Cardiovascular: Negative for chest pain. Gastrointestinal: Negative for abdominal pain, nausea and vomiting. Musculoskeletal: Negative for arthralgias, joint swelling and myalgias. Skin:        The patient reports pain and swelling to the distal part of the patient's right fifth finger. He denies numbness or tingling, loss of sensation, or pain with passive flexion.        PHYSICAL EXAM  (up to 7 for level 4, 8 or more for level 5)      INITIAL VITALS:  height is 5' 7\" (1.702 m) and weight is 165 lb (74.8 kg). His oral temperature is 98.7 °F (37.1 °C). His blood pressure is 128/83 and his pulse is 74. His respiration is 18 and oxygen saturation is 100%. Physical Exam   Constitutional: He is oriented to person, place, and time. He appears well-developed and well-nourished. Eyes: Pupils are equal, round, and reactive to light. Neck: Normal range of motion. Cardiovascular: Normal rate. Pulmonary/Chest: Effort normal and breath sounds normal.   Abdominal: Soft. There is no tenderness. Musculoskeletal: Normal range of motion. Neurological: He is alert and oriented to person, place, and time. Skin: Skin is warm and dry. Capillary refill takes less than 2 seconds. He is not diaphoretic. Patient has a paronychia of the right fifth finger. The area is very tender and fluctuant. The entire dorsal portion of the distal part of the finger is swollen. There is mild swelling spreading to the choi side of the finger, but is not as extensive as a felon. He still has full ROM and is not tender on the palm portion of the distal finger. Nursing note and vitals reviewed. DIFFERENTIAL  DIAGNOSIS     Paronychia  Felon  Cellulitis    PLAN (LABS / IMAGING / EKG):  No orders of the defined types were placed in this encounter.       MEDICATIONS ORDERED:  Orders Placed This Encounter   Medications    lidocaine 1 % injection 20 mL    DISCONTD: cephALEXin (KEFLEX) 500 MG capsule     Sig: Take 1 capsule by mouth 4 times daily for 7 days     Dispense:  28 capsule     Refill:  0    DISCONTD: cephALEXin (KEFLEX) capsule 500 mg    cephALEXin (KEFLEX) 250 MG/5ML suspension 500 mg    cephALEXin (KEFLEX) 250 MG/5ML suspension     Sig: 10 mLs by Per G Tube route 4 times daily for 10 days     Dispense:  400 mL     Refill:  0       Controlled Substances Monitoring:      DIAGNOSTIC RESULTS / EMERGENCY DEPARTMENT COURSE / MDM

## 2019-07-09 ENCOUNTER — HOSPITAL ENCOUNTER (EMERGENCY)
Age: 49
Discharge: HOME | End: 2019-07-09
Attending: EMERGENCY MEDICINE
Payer: COMMERCIAL

## 2019-07-09 VITALS
WEIGHT: 165 LBS | TEMPERATURE: 98 F | HEART RATE: 62 BPM | DIASTOLIC BLOOD PRESSURE: 85 MMHG | SYSTOLIC BLOOD PRESSURE: 132 MMHG | RESPIRATION RATE: 15 BRPM | OXYGEN SATURATION: 99 % | BODY MASS INDEX: 25.84 KG/M2

## 2019-07-09 DIAGNOSIS — L03.011 PARONYCHIA OF FINGER OF RIGHT HAND: Primary | ICD-10-CM

## 2019-07-09 PROCEDURE — 99283 EMERGENCY DEPT VISIT LOW MDM: CPT

## 2019-07-09 ASSESSMENT — ENCOUNTER SYMPTOMS
COLOR CHANGE: 0
COUGH: 0
NAUSEA: 0
VOMITING: 0
BACK PAIN: 0
ABDOMINAL PAIN: 0
SORE THROAT: 0
SHORTNESS OF BREATH: 0
DIARRHEA: 0

## 2019-07-09 NOTE — ED NOTES
Dearaquel Waters PA @ bedside for eval and drain mild fluid from wound site that appears to be healing well. Pt states pain has decreaseed and is feeling much better. Pt in NAD.      Zay Torres RN  07/09/19 1204

## 2019-07-09 NOTE — ED PROVIDER NOTES
[facility] ED  eMERGENCY dEPARTMENT eNCOUnter      Pt Name: Erika Mccarty  MRN: [id number]  [place] 1970  Date of evaluation: 7/9/2019  Provider: Africa Mishra PA-C    CHIEF COMPLAINT       Chief Complaint   Patient presents with    Wound Check             HISTORY OF PRESENT ILLNESS  (Location/Symptom, Timing/Onset, Context/Setting, Quality, Duration, Modifying Factors, Severity.)   Erika Mccarty is a 50 y.o. male who presents to the emergencydepartment for a recheck of his paronychia on the right small finger. I did see him yesterday so that way I can see him today for evaluation to see if it was getting better. Patient states he has no pain and the swelling has gone down. He states he is taking the antibiotics as prescribed as directed and feels a lot better. He denies any chest pain or shortness of breath. No abdominal pain. No fevers or chills. No nausea or vomiting. No other symptoms. REVIEW OF SYSTEMS    (2-9 systems for level 4, 10 ormore for level 5)     Review of Systems   Constitutional: Negative for chills, fatigue and fever. HENT: Negative for sore throat. Respiratory: Negative for cough and shortness of breath. Cardiovascular: Negative for chest pain, palpitations and leg swelling. Gastrointestinal: Negative for abdominal pain, diarrhea, nausea and vomiting. Genitourinary: Negative for flank pain. Musculoskeletal: Negative for back pain, neck pain and neck stiffness. Paronychia noted to the right small finger. Skin: Negative for color change. Neurological: Negative for dizziness, facial asymmetry, speech difficulty, weakness, light-headedness, numbness and headaches.          PAST MEDICAL HISTORY         Diagnosis Date    Cancer [facility]) 2008    oral     Colon cancer screening     Colon polyps 11/26/2018    tubular adenoma    Uses feeding tube        SURGICAL HISTORY           Procedure Laterality Date    COLONOSCOPY  11/26/2018

## 2019-09-27 NOTE — ED PROVIDER NOTES
[facility]     Emergency Department     Faculty Attestation    I performed a history and physical examination of the patient and discussed management with the resident. I have reviewed and agree with the residents findings including all diagnostic interpretations, and treatment plans as written at the time of my review. Any areas of disagreement are noted on the chart. I was personally present for the key portions of any procedures. I have documented in the chart those procedures where I was not present during the key portions. For Physician Assistant/ Nurse Practitioner cases/documentation I have personally evaluated this patient and have completed at least one if not all key elements of the E/M (history, physical exam, and MDM). Additional findings are as noted. Primary Care Physician: Jessica Curry, APRN - CNP    History: This is a 50 y.o. male who presents to the Emergency Department with complaint of recheck for a paronychia drained yesterday. The patient states it is not improved. Physical:   weight is 165 lb (74.8 kg). His axillary temperature is 98 °F (36.7 °C). His blood pressure is 132/85 and his pulse is 62. His respiration is 15 and oxygen saturation is 99%. Does have some mild edema noted in the distal portion of the finger no erythema or warmth. Impression: Improving paronychia    Plan: Continue with current treatment      (Please note that portions of this note were completed with a voice recognition program.  Efforts were made to edit the dictations but occasionally words are mis-transcribed.)    Jenniffer Campa MD, [facility]  Attending Emergency Medicine Physician         Lorri Rosenberg MD  07/09/19 5782 detailed exam

## 2020-02-18 VITALS
OXYGEN SATURATION: 97 % | TEMPERATURE: 100.7 F | RESPIRATION RATE: 16 BRPM | WEIGHT: 165 LBS | HEART RATE: 104 BPM | BODY MASS INDEX: 25.9 KG/M2 | DIASTOLIC BLOOD PRESSURE: 66 MMHG | SYSTOLIC BLOOD PRESSURE: 101 MMHG | HEIGHT: 67 IN

## 2020-02-18 ASSESSMENT — PAIN DESCRIPTION - PAIN TYPE: TYPE: ACUTE PAIN

## 2020-02-18 ASSESSMENT — PAIN SCALES - GENERAL: PAINLEVEL_OUTOF10: 7

## 2020-02-18 ASSESSMENT — PAIN DESCRIPTION - DESCRIPTORS: DESCRIPTORS: ACHING

## 2020-02-18 ASSESSMENT — PAIN DESCRIPTION - LOCATION: LOCATION: GENERALIZED

## 2020-02-19 ENCOUNTER — HOSPITAL ENCOUNTER (EMERGENCY)
Age: 50
Discharge: HOME | End: 2020-02-19
Attending: EMERGENCY MEDICINE
Payer: COMMERCIAL

## 2020-02-19 PROCEDURE — 6370000000 HC RX 637 (ALT 250 FOR IP): Performed by: STUDENT IN AN ORGANIZED HEALTH CARE EDUCATION/TRAINING PROGRAM

## 2020-02-19 PROCEDURE — 99283 EMERGENCY DEPT VISIT LOW MDM: CPT

## 2020-02-19 RX ORDER — ACETAMINOPHEN 325 MG/1
650 TABLET ORAL ONCE
Status: DISCONTINUED | OUTPATIENT
Start: 2020-02-19 | End: 2020-02-19 | Stop reason: HOSPADM

## 2020-02-19 RX ORDER — ACETAMINOPHEN 325 MG/1
650 TABLET ORAL ONCE
Status: COMPLETED | OUTPATIENT
Start: 2020-02-19 | End: 2020-02-19

## 2020-02-19 RX ADMIN — ACETAMINOPHEN 650 MG: 325 TABLET ORAL at 01:03

## 2020-02-19 ASSESSMENT — ENCOUNTER SYMPTOMS
SHORTNESS OF BREATH: 0
NAUSEA: 0
CHOKING: 0
CHEST TIGHTNESS: 0
WHEEZING: 0
ABDOMINAL PAIN: 0
STRIDOR: 0
APNEA: 0
COLOR CHANGE: 0
COUGH: 1
CONSTIPATION: 0
SORE THROAT: 0
ABDOMINAL DISTENTION: 0
DIARRHEA: 0
VOMITING: 0

## 2020-02-19 ASSESSMENT — PAIN DESCRIPTION - FREQUENCY: FREQUENCY: CONTINUOUS

## 2020-02-19 ASSESSMENT — PAIN DESCRIPTION - DESCRIPTORS: DESCRIPTORS: ACHING

## 2020-02-19 ASSESSMENT — PAIN DESCRIPTION - PAIN TYPE: TYPE: ACUTE PAIN

## 2020-02-19 ASSESSMENT — PAIN DESCRIPTION - ONSET: ONSET: ON-GOING

## 2020-02-19 ASSESSMENT — PAIN SCALES - GENERAL
PAINLEVEL_OUTOF10: 7
PAINLEVEL_OUTOF10: 8

## 2020-02-19 ASSESSMENT — PAIN DESCRIPTION - LOCATION: LOCATION: GENERALIZED

## 2020-02-19 NOTE — ED PROVIDER NOTES
[facility]     Emergency Department     Faculty Attestation    I performed a history and physical examination of the patient and discussed management with the resident. I have reviewed and agree with the residents findings including all diagnostic interpretations, and treatment plans as written at the time of my review. Any areas of disagreement are noted on the chart. I was personally present for the key portions of any procedures. I have documented in the chart those procedures where I was not present during the key portions. For Physician Assistant/ Nurse Practitioner cases/documentation I have personally evaluated this patient and have completed at least one if not all key elements of the E/M (history, physical exam, and MDM). Additional findings are as noted. Primary Care Physician: QIAN Amaral - CNP    History: This is a 52 y.o. male who presents to the Emergency Department with complaint of cough fever myalgia. This been ongoing for the past couple of days. There is been no vomiting or diarrhea. Physical:   height is 5' 7\" (1.702 m) and weight is 165 lb (74.8 kg). His oral temperature is 100.7 °F (38.2 °C). His blood pressure is 101/66 and his pulse is 104. His respiration is 16 and oxygen saturation is 97%. Lungs clear to auscultation bilateral, heart mildly tachycardic with a regular rhythm, abdomen is soft nontender    Impression: Viral illness    Plan: Tylenol, symptomatic treatment      (Please note that portions of this note were completed with a voice recognition program.  Efforts were made to edit the dictations but occasionally words are mis-transcribed.)    Laura Man.  Britt Quinn MD, [address] [employer] [facility]  Attending Emergency Medicine Physician        Jer Lowery MD  02/19/20 3697

## 2020-03-06 ENCOUNTER — APPOINTMENT (OUTPATIENT)
Dept: CT IMAGING | Age: 50
End: 2020-03-06
Payer: COMMERCIAL

## 2020-03-06 ENCOUNTER — HOSPITAL ENCOUNTER (EMERGENCY)
Age: 50
Discharge: HOME | End: 2020-03-06
Attending: EMERGENCY MEDICINE
Payer: COMMERCIAL

## 2020-03-06 VITALS
RESPIRATION RATE: 18 BRPM | HEART RATE: 70 BPM | WEIGHT: 159 LBS | BODY MASS INDEX: 24.9 KG/M2 | DIASTOLIC BLOOD PRESSURE: 85 MMHG | SYSTOLIC BLOOD PRESSURE: 124 MMHG | OXYGEN SATURATION: 99 % | TEMPERATURE: 97.9 F

## 2020-03-06 PROCEDURE — 72125 CT NECK SPINE W/O DYE: CPT

## 2020-03-06 PROCEDURE — 6370000000 HC RX 637 (ALT 250 FOR IP): Performed by: STUDENT IN AN ORGANIZED HEALTH CARE EDUCATION/TRAINING PROGRAM

## 2020-03-06 PROCEDURE — 70450 CT HEAD/BRAIN W/O DYE: CPT

## 2020-03-06 PROCEDURE — 99284 EMERGENCY DEPT VISIT MOD MDM: CPT

## 2020-03-06 RX ORDER — ACETAMINOPHEN 325 MG/1
650 TABLET ORAL ONCE
Status: COMPLETED | OUTPATIENT
Start: 2020-03-06 | End: 2020-03-06

## 2020-03-06 RX ADMIN — ACETAMINOPHEN 650 MG: 325 TABLET ORAL at 00:38

## 2020-03-06 ASSESSMENT — PAIN DESCRIPTION - FREQUENCY: FREQUENCY: INTERMITTENT

## 2020-03-06 ASSESSMENT — PAIN SCALES - GENERAL
PAINLEVEL_OUTOF10: 8
PAINLEVEL_OUTOF10: 8

## 2020-03-06 ASSESSMENT — PAIN DESCRIPTION - PAIN TYPE: TYPE: ACUTE PAIN

## 2020-03-06 ASSESSMENT — PAIN DESCRIPTION - LOCATION: LOCATION: HEAD;NECK

## 2020-03-06 ASSESSMENT — PAIN DESCRIPTION - DESCRIPTORS: DESCRIPTORS: ACHING
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
## 2020-03-07 NOTE — PROGRESS NOTES
This patient was assigned to me by error. This patient was never interviewed nor examined by me. I did not participate in the care of this patient.     Christine South, PGY-2

## 2020-03-29 ENCOUNTER — HOSPITAL ENCOUNTER (EMERGENCY)
Age: 50
Discharge: HOME | End: 2020-03-30
Attending: EMERGENCY MEDICINE
Payer: COMMERCIAL

## 2020-03-29 VITALS
OXYGEN SATURATION: 99 % | SYSTOLIC BLOOD PRESSURE: 90 MMHG | HEART RATE: 85 BPM | RESPIRATION RATE: 16 BRPM | DIASTOLIC BLOOD PRESSURE: 61 MMHG | TEMPERATURE: 97.5 F

## 2020-03-29 PROCEDURE — 99283 EMERGENCY DEPT VISIT LOW MDM: CPT

## 2020-03-29 PROCEDURE — 49450 REPLACE G/C TUBE PERC: CPT

## 2020-03-30 ENCOUNTER — APPOINTMENT (OUTPATIENT)
Dept: GENERAL RADIOLOGY | Age: 50
End: 2020-03-30
Payer: COMMERCIAL

## 2020-03-30 PROCEDURE — 6360000004 HC RX CONTRAST MEDICATION: Performed by: EMERGENCY MEDICINE

## 2020-03-30 PROCEDURE — 74018 RADEX ABDOMEN 1 VIEW: CPT

## 2020-03-30 RX ADMIN — DIATRIZOATE MEGLUMINE AND DIATRIZOATE SODIUM 120 ML: 660; 100 LIQUID ORAL; RECTAL at 00:56

## 2020-03-30 ASSESSMENT — ENCOUNTER SYMPTOMS
VOMITING: 0
SORE THROAT: 0
ABDOMINAL PAIN: 0
BACK PAIN: 0
SHORTNESS OF BREATH: 0

## 2020-03-30 NOTE — ED NOTES
Pt presented to ED with complaints of pulling out feeding tube 1 hour ago. Pt denies pain. Pt alert and oriented x 4. RR even and non labored.      Carmenza Navarrete RN  03/30/20 0000

## 2020-05-16 ENCOUNTER — HOSPITAL ENCOUNTER (EMERGENCY)
Age: 50
Discharge: HOME | End: 2020-05-16
Attending: EMERGENCY MEDICINE
Payer: COMMERCIAL

## 2020-05-16 VITALS
SYSTOLIC BLOOD PRESSURE: 120 MMHG | BODY MASS INDEX: 22.73 KG/M2 | TEMPERATURE: 98.4 F | WEIGHT: 150 LBS | DIASTOLIC BLOOD PRESSURE: 75 MMHG | OXYGEN SATURATION: 98 % | HEIGHT: 68 IN | RESPIRATION RATE: 16 BRPM | HEART RATE: 81 BPM

## 2020-05-16 PROCEDURE — 6360000002 HC RX W HCPCS: Performed by: STUDENT IN AN ORGANIZED HEALTH CARE EDUCATION/TRAINING PROGRAM

## 2020-05-16 PROCEDURE — 99283 EMERGENCY DEPT VISIT LOW MDM: CPT

## 2020-05-16 PROCEDURE — 96372 THER/PROPH/DIAG INJ SC/IM: CPT

## 2020-05-16 PROCEDURE — 10060 I&D ABSCESS SIMPLE/SINGLE: CPT

## 2020-05-16 RX ORDER — IBUPROFEN 800 MG/1
800 TABLET ORAL ONCE
Status: DISCONTINUED | OUTPATIENT
Start: 2020-05-16 | End: 2020-05-16

## 2020-05-16 RX ORDER — ACETAMINOPHEN 500 MG
1000 TABLET ORAL EVERY 6 HOURS PRN
Qty: 30 TABLET | Refills: 0 | Status: SHIPPED | OUTPATIENT
Start: 2020-05-16 | End: 2020-05-16 | Stop reason: ALTCHOICE

## 2020-05-16 RX ORDER — IBUPROFEN 600 MG/1
600 TABLET ORAL EVERY 6 HOURS PRN
Qty: 20 TABLET | Refills: 0 | Status: SHIPPED | OUTPATIENT
Start: 2020-05-16 | End: 2020-05-16 | Stop reason: ALTCHOICE

## 2020-05-16 RX ORDER — KETOROLAC TROMETHAMINE 30 MG/ML
30 INJECTION, SOLUTION INTRAMUSCULAR; INTRAVENOUS ONCE
Status: COMPLETED | OUTPATIENT
Start: 2020-05-16 | End: 2020-05-16

## 2020-05-16 RX ADMIN — KETOROLAC TROMETHAMINE 30 MG: 30 INJECTION, SOLUTION INTRAMUSCULAR at 12:54

## 2020-05-16 ASSESSMENT — PAIN SCALES - GENERAL
PAINLEVEL_OUTOF10: 2
PAINLEVEL_OUTOF10: 2

## 2020-05-16 ASSESSMENT — ENCOUNTER SYMPTOMS
CHEST TIGHTNESS: 0
EYE DISCHARGE: 0
ABDOMINAL PAIN: 0
SHORTNESS OF BREATH: 0
EYE REDNESS: 0

## 2020-05-16 NOTE — ED PROVIDER NOTES
with complaint of finger swelling. Middle finger right hand. Worsening over the past several days. No numbness no weakness no trauma. Physical:     vitals were not taken for this visit. 52 y.o. male no acute distress, there is swelling and fluctuance around the nail on the middle finger of the right hand.   There is no tenderness to palpation of the pad there is no fusiform swelling no pain with passive stretch    Impression: Paronychia    Plan: I&D      Brigitte Noguera MD, [facility]  Attending Emergency Physician        Nancy Cohen MD  05/16/20 2369

## 2020-05-18 ENCOUNTER — CARE COORDINATION (OUTPATIENT)
Dept: CARE COORDINATION | Age: 50
End: 2020-05-18

## 2020-05-19 ENCOUNTER — CARE COORDINATION (OUTPATIENT)
Dept: CARE COORDINATION | Age: 50
End: 2020-05-19

## 2020-05-19 ENCOUNTER — TELEPHONE (OUTPATIENT)
Dept: FAMILY MEDICINE CLINIC | Age: 50
End: 2020-05-19

## 2020-05-19 NOTE — CARE COORDINATION
Patient referred for LOOP Program: Yes   Patient's preferred e-mail:  [email or web address]  Patient's preferred phone [address] [facility]: self monitoring   LOOP Provider:  Kirstin   ([place] ONLY) Recommended Follow Up:  Patient plans to follow up with provider

## 2020-05-19 NOTE — TELEPHONE ENCOUNTER
Randell Cornell   2020 11:45 AM   Care Coordination   MRN:  [id number]   Description: Male : 1970 Provider: Lauro Smith RN Department: px Care Coordinator   Reason for Call     Concern For COVID-19 2nd ED f/up    Outgoing Call      Provider Monie Abel   2020 11:45 AM Lauro Smith RN [facility] [address]   Call Documentation     No notes of this type exist for this encounter. Care Coor Phone Call     Lauro Smith RN at 2020 11:49 AM     Status: Signed      Patient contacted regarding recent discharge and COVID-19 risk   Care Transition Nurse/ Ambulatory Care Manager contacted the patient by telephone to perform post discharge assessment. Verified name and  with patient as identifiers.      Patient has following risk factors of: no known risk factors. CTN/ACM reviewed discharge instructions, medical action plan and red flags related to discharge diagnosis. Reviewed and educated them on any new and changed medications related to discharge diagnosis. Advised obtaining a 90-day supply of all daily and as-needed medications.      Education provided regarding infection prevention, and signs and symptoms of COVID-19 and when to seek medical attention with patient who verbalized understanding. Discussed exposure protocols and quarantine from [address] you at higher risk for severe illness  and given an opportunity for questions and concerns. The patient agrees to contact the COVID-19 hotline [phone] or PCP office for questions related to their healthcare. CTN/ACM provided contact information for future reference.     From CDC: Are you at higher risk for severe illness?            Wash your hands often.            Avoid close contact (6 feet, which is about two arm lengths) with people who are sick.            Put distance between yourself and other people if COVID-19 is spreading in your community.              Clean and disinfect

## 2020-06-05 ENCOUNTER — HOSPITAL ENCOUNTER (OUTPATIENT)
Age: 50
Discharge: HOME | End: 2020-06-05
Payer: COMMERCIAL

## 2020-06-05 LAB
ABSOLUTE EOS #: <0.03 K/UL (ref 0–0.44)
ABSOLUTE IMMATURE GRANULOCYTE: <0.03 K/UL (ref 0–0.3)
ABSOLUTE LYMPH #: 0.85 K/UL (ref 1.1–3.7)
ABSOLUTE MONO #: 0.32 K/UL (ref 0.1–1.2)
ALBUMIN SERPL-MCNC: 4.1 G/DL (ref 3.5–5.2)
ALBUMIN/GLOBULIN RATIO: 1.4 (ref 1–2.5)
ALP BLD-CCNC: 81 U/L (ref 40–129)
ALT SERPL-CCNC: 17 U/L (ref 5–41)
ANION GAP SERPL CALCULATED.3IONS-SCNC: 15 MMOL/L (ref 9–17)
AST SERPL-CCNC: 24 U/L
BASOPHILS # BLD: 0 % (ref 0–2)
BASOPHILS ABSOLUTE: <0.03 K/UL (ref 0–0.2)
BILIRUB SERPL-MCNC: 0.5 MG/DL (ref 0.3–1.2)
BUN BLDV-MCNC: 16 MG/DL (ref 6–20)
BUN/CREAT BLD: ABNORMAL (ref 9–20)
CALCIUM SERPL-MCNC: 9.2 MG/DL (ref 8.6–10.4)
CHLORIDE BLD-SCNC: 102 MMOL/L (ref 98–107)
CHOLESTEROL, FASTING: 117 MG/DL
CHOLESTEROL/HDL RATIO: 2.9
CO2: 23 MMOL/L (ref 20–31)
CREAT SERPL-MCNC: 0.47 MG/DL (ref 0.7–1.2)
DIFFERENTIAL TYPE: ABNORMAL
EOSINOPHILS RELATIVE PERCENT: 0 % (ref 1–4)
GFR AFRICAN AMERICAN: >60 ML/MIN
GFR NON-AFRICAN AMERICAN: >60 ML/MIN
GFR SERPL CREATININE-BSD FRML MDRD: ABNORMAL ML/MIN/{1.73_M2}
GFR SERPL CREATININE-BSD FRML MDRD: ABNORMAL ML/MIN/{1.73_M2}
GLUCOSE BLD-MCNC: 106 MG/DL (ref 70–99)
HCT VFR BLD CALC: 40.2 % (ref 40.7–50.3)
HDLC SERPL-MCNC: 41 MG/DL
HEMOGLOBIN: 13.5 G/DL (ref 13–17)
IMMATURE GRANULOCYTES: 0 %
LDL CHOLESTEROL: 61 MG/DL (ref 0–130)
LYMPHOCYTES # BLD: 17 % (ref 24–43)
MCH RBC QN AUTO: 32.8 PG (ref 25.2–33.5)
MCHC RBC AUTO-ENTMCNC: 33.6 G/DL (ref 28.4–34.8)
MCV RBC AUTO: 97.6 FL (ref 82.6–102.9)
MONOCYTES # BLD: 6 % (ref 3–12)
NRBC AUTOMATED: 0 PER 100 WBC
PDW BLD-RTO: 11.7 % (ref 11.8–14.4)
PLATELET # BLD: 224 K/UL (ref 138–453)
PLATELET ESTIMATE: ABNORMAL
PMV BLD AUTO: 10.4 FL (ref 8.1–13.5)
POTASSIUM SERPL-SCNC: 4 MMOL/L (ref 3.7–5.3)
RBC # BLD: 4.12 M/UL (ref 4.21–5.77)
RBC # BLD: ABNORMAL 10*6/UL
SEG NEUTROPHILS: 77 % (ref 36–65)
SEGMENTED NEUTROPHILS ABSOLUTE COUNT: 3.87 K/UL (ref 1.5–8.1)
SODIUM BLD-SCNC: 140 MMOL/L (ref 135–144)
THYROXINE, FREE: 1.26 NG/DL (ref 0.93–1.7)
TOTAL PROTEIN: 7 G/DL (ref 6.4–8.3)
TRIGLYCERIDE, FASTING: 77 MG/DL
TSH SERPL DL<=0.05 MIU/L-ACNC: 1.34 MIU/L (ref 0.3–5)
VITAMIN D 25-HYDROXY: 32.6 NG/ML (ref 30–100)
VLDLC SERPL CALC-MCNC: NORMAL MG/DL (ref 1–30)
WBC # BLD: 5.1 K/UL (ref 3.5–11.3)
WBC # BLD: ABNORMAL 10*3/UL

## 2020-06-05 PROCEDURE — 80053 COMPREHEN METABOLIC PANEL: CPT

## 2020-06-05 PROCEDURE — 84443 ASSAY THYROID STIM HORMONE: CPT

## 2020-06-05 PROCEDURE — 80061 LIPID PANEL: CPT

## 2020-06-05 PROCEDURE — 82306 VITAMIN D 25 HYDROXY: CPT

## 2020-06-05 PROCEDURE — 85025 COMPLETE CBC W/AUTO DIFF WBC: CPT

## 2020-06-05 PROCEDURE — 84439 ASSAY OF FREE THYROXINE: CPT

## 2020-07-13 ENCOUNTER — HOSPITAL ENCOUNTER (EMERGENCY)
Age: 50
Discharge: HOME | End: 2020-07-13
Attending: EMERGENCY MEDICINE
Payer: COMMERCIAL

## 2020-07-13 VITALS
RESPIRATION RATE: 19 BRPM | DIASTOLIC BLOOD PRESSURE: 75 MMHG | HEART RATE: 85 BPM | OXYGEN SATURATION: 99 % | TEMPERATURE: 97.1 F | SYSTOLIC BLOOD PRESSURE: 118 MMHG

## 2020-07-13 PROCEDURE — 99283 EMERGENCY DEPT VISIT LOW MDM: CPT

## 2020-07-13 RX ORDER — ASPIRIN 81 MG/1
162 TABLET ORAL DAILY
Qty: 45 TABLET | Refills: 0 | Status: SHIPPED | OUTPATIENT
Start: 2020-07-13

## 2020-07-13 RX ORDER — ACETAMINOPHEN 325 MG/1
325 TABLET ORAL EVERY 6 HOURS PRN
Qty: 60 TABLET | Refills: 0 | Status: SHIPPED | OUTPATIENT
Start: 2020-07-13

## 2020-07-13 ASSESSMENT — PAIN DESCRIPTION - PAIN TYPE: TYPE: ACUTE PAIN

## 2020-07-13 ASSESSMENT — PAIN SCALES - GENERAL: PAINLEVEL_OUTOF10: 7

## 2020-07-13 NOTE — ED NOTES
Pt to the ED with generalized complaints. Pt reports generalized body aches along with loss of taste and smell x 3 days. Pt denies taking anything at home for symptoms. Pt denies SOB, cough, fever, chills. Pt denies diarrhea or any other complaints.    On exam, pt awake and oriented x 4, pt ambulatory with steady gait, no respiratory distress noted, pt with stable vital signs      Yasemin Arreguin RN  07/13/20 9477

## 2020-07-13 NOTE — ED PROVIDER NOTES
[facility]  Emergency Department  Faculty Attestation     I performed a history and physical examination of the patient and discussed management with the resident. I reviewed the residents note and agree with the documented findings and plan of care. Any areas of disagreement are noted on the chart. I was personally present for the key portions of any procedures. I have documented in the chart those procedures where I was not present during the key portions. I have reviewed the emergency nurses triage note. I agree with the chief complaint, past medical history, past surgical history, allergies, medications, social and family history as documented unless otherwise noted below. For Physician Assistant/ Nurse Practitioner cases/documentation I have personally evaluated this patient and have completed at least one if not all key elements of the E/M (history, physical exam, and MDM). Additional findings are as noted. Primary Care Physician:  QIAN Cox - CNP    Screenings:  [unfilled]    CHIEF COMPLAINT       Chief Complaint   Patient presents with    Generalized Body Aches     pt c/o generalized bocy aches along with loss of taste and smell x 3 days        RECENT VITALS:   Temp: 97.1 °F (36.2 °C),  Pulse: 85, Resp: 19, BP: 118/75    LABS:  Labs Reviewed - No data to display    Radiology  No orders to display         Attending Physician Additional  Notes    Patient is had 4 to 5 days of COVID type symptoms including myalgias especially in his thigh and shoulders, loss of sense of smell and taste. No chest pain shortness of breath. She had he had diarrhea previously but not recently. No fever chills or sweats. No exposures to others with COVID. He has a history of tongue/throat cancer with prior surgery, not actively on chemotherapy or radiation. On exam is nontoxic afebrile vital signs normal.  Lungs are clear. Neck is supple.   No abnormal lymphadenopathy. Skin is warm and dry. Impression is anosmia, dyschezia, myalgias, concern for COVID. Plan is Tylenol for myalgias, increase fluids, follow-up to the flu clinic for testing. Carol Ram.  Agata Dao MD, [facility]  Attending Emergency  Physician               Jonny Avery MD  07/13/20 5173

## 2020-07-14 ENCOUNTER — CARE COORDINATION (OUTPATIENT)
Dept: CARE COORDINATION | Age: 50
End: 2020-07-14

## 2020-07-14 ENCOUNTER — HOSPITAL ENCOUNTER (OUTPATIENT)
Age: 50
Setting detail: SPECIMEN
Discharge: HOME | End: 2020-07-14
Payer: COMMERCIAL

## 2020-07-14 ENCOUNTER — OFFICE VISIT (OUTPATIENT)
Dept: PRIMARY CARE CLINIC | Age: 50
End: 2020-07-14
Payer: COMMERCIAL

## 2020-07-14 VITALS
SYSTOLIC BLOOD PRESSURE: 114 MMHG | HEART RATE: 72 BPM | OXYGEN SATURATION: 98 % | TEMPERATURE: 97 F | DIASTOLIC BLOOD PRESSURE: 74 MMHG

## 2020-07-14 PROCEDURE — 1036F TOBACCO NON-USER: CPT | Performed by: NURSE PRACTITIONER

## 2020-07-14 PROCEDURE — G8427 DOCREV CUR MEDS BY ELIG CLIN: HCPCS | Performed by: NURSE PRACTITIONER

## 2020-07-14 PROCEDURE — G8420 CALC BMI NORM PARAMETERS: HCPCS | Performed by: NURSE PRACTITIONER

## 2020-07-14 PROCEDURE — 99213 OFFICE O/P EST LOW 20 MIN: CPT | Performed by: NURSE PRACTITIONER

## 2020-07-14 ASSESSMENT — PATIENT HEALTH QUESTIONNAIRE - PHQ9
2. FEELING DOWN, DEPRESSED OR HOPELESS: 0
SUM OF ALL RESPONSES TO PHQ QUESTIONS 1-9: 0
SUM OF ALL RESPONSES TO PHQ QUESTIONS 1-9: 0
1. LITTLE INTEREST OR PLEASURE IN DOING THINGS: 0
SUM OF ALL RESPONSES TO PHQ9 QUESTIONS 1 & 2: 0
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
## 2020-07-14 NOTE — ED PROVIDER NOTES
[facility] ED  Emergency Department Encounter  EmergencyMedicine Resident     Pt Shwetha Noguera  MRN: [id number]  [place] 1970  Date of evaluation: 7/13/20  PCP:  QIAN Velez CNP    CHIEF COMPLAINT       Chief Complaint   Patient presents with    Generalized Body Aches     pt c/o generalized bocy aches along with loss of taste and smell x 3 days      HISTORY OF PRESENT ILLNESS  (Location/Symptom, Timing/Onset, Context/Setting, Quality, Duration, Modifying Factors, Severity.)      Kilo Canas is a 52 y.o. male who presents with several days of generalized body aches, starting the lower back and then the outer thighs associated with decreased sense of smell and taste. Patient is history of throat cancer with G-tube dependence. Attempted to drink something to test a sense of taste the other day and had minimal sensation of taste, associated that with known symptoms of COVID and came to be checked. Has no issues breathing no cough no confusion no fever at home. No known sick contacts    PAST MEDICAL / SURGICAL / SOCIAL / FAMILY HISTORY      has a past medical history of Cancer [facility]), Colon cancer screening, Colon polyps, and Uses feeding tube. has a past surgical history that includes Mandible reconstruction and Colonoscopy (11/26/2018).     Social History     Socioeconomic History    Marital status: Single     Spouse name: Not on file    Number of children: Not on file    Years of education: Not on file    Highest education level: Not on file   Occupational History    Not on file   Social Needs    Financial resource strain: Not on file    Food insecurity     Worry: Not on file     Inability: Not on file    Transportation needs     Medical: Not on file     Non-medical: Not on file   Tobacco Use    Smoking status: Former Smoker     Packs/day: 0.25     Years: 15.00     Pack years: 3.75     Last attempt to quit: 1/1/2005     Years since quitting: 15.5    Smokeless tobacco: Never Used   Substance and Sexual Activity    Alcohol use: Yes     Comment: weekly    Drug use: Yes     Types: Marijuana     Comment: Past hx    Sexual activity: Yes     Partners: Female   Lifestyle    Physical activity     Days per week: Not on file     Minutes per session: Not on file    Stress: Not on file   Relationships    Social connections     Talks on phone: Not on file     Gets together: Not on file     Attends Yazidism service: Not on file     Active member of club or organization: Not on file     Attends meetings of clubs or organizations: Not on file     Relationship status: Not on file    Intimate partner violence     Fear of current or ex partner: Not on file     Emotionally abused: Not on file     Physically abused: Not on file     Forced sexual activity: Not on file   Other Topics Concern    Not on file   Social History Narrative    Not on file       Family History   Problem Relation Age of Onset    High Blood Pressure Mother     Heart Disease Father        Allergies:  No known allergies    Home Medications:  Prior to Admission medications    Medication Sig Start Date End Date Taking?  Authorizing Provider   acetaminophen (TYLENOL) 325 MG tablet Take 1 tablet by mouth every 6 hours as needed for Pain 7/13/20  Yes Filippo Espinoza MD   aspirin EC 81 MG EC tablet Take 2 tablets by mouth daily While having COVID symptoms 7/13/20  Yes Filippo Espinoza MD       REVIEW OF SYSTEMS    (2-9 systems for level 4, 10 or more for level 5)      General ROS - No fevers, No chills, no gradual weight loss, no night sweats  Ophthalmic ROS - No discharge, No changes in vision  ENT ROS -  No sore throat, No rhinorrhea, ageusia anosmia  Respiratory ROS - no shortness of breath, no cough, no  wheezing  Cardiovascular ROS - No chest pain, no dyspnea on exertion  Gastrointestinal ROS - No abdominal pain, no nausea, no vomiting, no change in bowel habits, no black or bloody stools  Genito-Urinary

## 2020-07-14 NOTE — PATIENT INSTRUCTIONS
You were tested for COVID today. We will call you with results when they are available. If you have not heard from us in 7 days, please call our office. The COVID-19 test that was done today can take 1-6 days for results. Until then you should assume you have this disease and adhere to home isolation as described below. When we get the test results back, one of the following readings will be obtained. 1. A positive test means you have the virus. 2.  An inconclusive test means it wasn't sure if you have the virus or not. An inconclusive test result is treated as a positive result and recommendations  are the same as a positive test result. We may ask you to repeat this test in this circumstance. 3.  A negative test means you probably do not have the virus. Prevention steps for People with positive or inconclusive test results or suspected  COVID-19 (including persons under investigation) who do not need to be hospitalized  and   People with confirmed COVID-19 who were hospitalized and determined to be medically stable to go home    Contacts who are NOT healthcare providers or first responders and are asymptomatic (no fever,  cough, shortness of breath, or difficulty breathing) should self-quarantine for 14 days from the last  date of exposure to confirmed or suspected COVID-19. Your healthcare provider and public health staff will evaluate whether you can be cared for at home. If it is determined that you do not need to be hospitalized and can be isolated at home, you will be monitored by staff from your health department. You should follow the prevention steps below until a healthcare provider or local or state health department says you can return to your normal activities. Stay home except to get medical care  People who are mildly ill with COVID-19 are able to isolate at home during their illness. You should restrict activities outside your home, except for getting medical care.  Do not go to work, school, or public areas. Avoid using public transportation, ride-sharing, or taxis. Separate yourself from other people and animals in your home  People: As much as possible, you should stay in a specific room and away from other people in your home. Also, you should use a separate bathroom, if available. Animals: You should restrict contact with pets and other animals while you are sick with COVID-19, just like you would around other people. Although there have not been reports of pets or other animals becoming sick with COVID-19, it is still recommended that people sick with COVID-19 limit contact with animals until more information is known about the virus. When possible, have another member of your household care for your animals while you are sick. If you are sick with COVID-19, avoid contact with your pet, including petting, snuggling, being kissed or licked, and sharing food. If you must care for your pet or be around animals while you are sick, wash your hands before and after you interact with pets and wear a facemask. Call ahead before visiting your doctor  If you have a medical appointment, call the healthcare provider and tell them that you have or may have COVID-19. This will help the healthcare providers office take steps to keep other people from getting infected or exposed. Wear a facemask  You should wear a facemask when you are around other people (e.g., sharing a room or vehicle) or pets and before you enter a healthcare providers office. If you are not able to wear a facemask (for example, because it causes trouble breathing), then people who live with you should not stay in the same room with you; they should also wear a facemask if they enter your room. Cover your coughs and sneezes  Cover your mouth and nose with a tissue when you cough or sneeze. Throw used tissues in a lined trash can.  Immediately wash your hands with soap and water for at least 20 seconds or, if soap and water are not available, clean your hands with an alcohol-based hand  that contains at least 60% alcohol. Clean your hands often  Wash your hands often with soap and water for at least 20 seconds, especially after blowing your nose, coughing, or sneezing; going to the bathroom; and before eating or preparing food. If soap and water are not readily available, use an alcohol-based hand  with at least 60% alcohol, covering all surfaces of your hands and rubbing them together until they feel dry. Soap and water are the best option if hands are visibly dirty. Avoid touching your eyes, nose, and mouth with unwashed hands. Avoid sharing personal household items  You should not share dishes, drinking glasses, cups, eating utensils, towels, or bedding with other people or pets in your home. After using these items, they should be washed thoroughly with soap and water. Clean all high-touch surfaces everyday  High touch surfaces include counters, tabletops, doorknobs, bathroom fixtures, toilets, phones, keyboards, tablets, and bedside tables. Also, clean any surfaces that may have blood, stool, or body fluids on them. Use a household cleaning spray or wipe, according to the label instructions. Labels contain instructions for safe and effective use of the cleaning product including precautions you should take when applying the product, such as wearing gloves and making sure you have good ventilation during use of the product. Monitor your symptoms  Seek prompt medical attention if your illness is worsening (e.g., difficulty breathing). Before seeking care, call your healthcare provider and tell them that you have, or are being evaluated for, COVID-19. Put on a facemask before you enter the facility. These steps will help the healthcare providers office to keep other people in the office or waiting room from getting infected or exposed.  Persons who are placed under active monitoring or facilitated self-monitoring should follow instructions provided by their local health department or occupational health professionals, as appropriate. When working with your local health department check their available hours. If you have a medical emergency and need to call 911, notify the dispatch personnel that you have, or are being evaluated for COVID-19. If possible, put on a facemask before emergency medical services arrive. Discontinuing home isolation  Patients with confirmed COVID-19 should remain under home isolation precautions until the risk of secondary transmission to others is thought to be low. The decision to discontinue home isolation precautions should be made on a case-by-case basis, in consultation with your physician and the health department. Please do NOT make this decision on your own. If your results of the COVID-19 test is NEGATIVE -     The patient may stop isolation, in consultation with your health care provider, typically when: Your healthcare provider has determined that the cause of the illness is NOT COVID-19 and approves your return to work. OR  Ten (10) days have passed since onset of symptoms AND three days (72 hours) have passed with no fever without taking medication (like Tylenol) to reduce fever,  respiratory symptoms have resolved and you have been evaluated by your health care provider. Please follow up with your physician for evaluation about this. The following websites are the best places for up to date information on this fluid situation. https://coronavirus. [place].gov/wps/portal/gov/covid-19/home/local-health-districts-and-providers/guidance-for-covid-19-exposure-management    Preventing the Spread of Coronavirus Disease 2019 in Homes and Residential Communities     For the most recent information go to [employer]  https://coronavirus. [place].gov/wps/portal/gov/covid-19/home/local-health-districts-and-providers/guidance-for-covid-19-exposure-management    Patient Education        10 Things to Do When You Have COVID-19    Stay home. Don't go to school, work, or public areas. And don't use public transportation, ride-shares, or taxis unless you have no choice. Leave your home only if you need to get medical care. But call the doctor's office first so they know you're coming. And wear a cloth face cover. Ask before leaving isolation. Talk with your doctor or other health professional about when it will be safe for you to leave isolation. Wear a cloth face cover when you are around other people. It can help stop the spread of the virus when you cough or sneeze. Limit contact with people in your home. If possible, stay in a separate bedroom and use a separate bathroom. Avoid contact with pets and other animals. If possible, have a friend or family member care for them while you're sick. Cover your mouth and nose with a tissue when you cough or sneeze. Then throw the tissue in the trash right away. Wash your hands often, especially after you cough or sneeze. Use soap and water, and scrub for at least 20 seconds. If soap and water aren't available, use an alcohol-based hand . Don't share personal household items. These include bedding, towels, cups and glasses, and eating utensils. Clean and disinfect your home every day. Use household  or disinfectant wipes or sprays. Take special care to clean things that you grab with your hands. These include doorknobs, remote controls, phones, and handles on your refrigerator and microwave. And don't forget countertops, tabletops, bathrooms, and computer keyboards. Take acetaminophen (Tylenol) to relieve fever and body aches.   Read and follow all instructions on the label. Current as of: May 8, 2020               Content Version: 12.5  © 3896-5769 Healthwise, [employer]. Care instructions adapted under license by [facility] ([facility]). If you have questions about a medical condition or this instruction, always ask your healthcare professional. [address] any warranty or liability for your use of this information. Patient Education        9 Things To Do If You've Been Exposed to COVID-19    Stay home. If you've been exposed, you should stay in isolation for 14 days. Don't go to school, work, or public areas. And don't use public transportation, ride-shares, or taxis unless you have no choice. Leave your home only if you need to get medical care. But call the doctor's office first so they know you're coming, and wear a cloth face cover when you go. Call your doctor. Call your doctor or other health professional to let them know that you've been exposed. They might want you to be tested, or they may have other instructions for you. If you become sick, wear a face cover when you are around other people. It can help stop the spread of the virus when you cough or sneeze. Limit contact with people in your home. If possible, stay in a separate bedroom and use a separate bathroom. Avoid contact with pets and other animals. Cover your mouth and nose with a tissue when you cough or sneeze. Then throw it in the trash right away. Wash your hands often, especially after you cough or sneeze. Use soap and water, and scrub for at least 20 seconds. If soap and water aren't available, use an alcohol-based hand . Don't share personal household items. These include bedding, towels, cups and glasses, and eating utensils. Clean and disinfect your home every day. Use household  or disinfectant wipes or sprays. Take special care to clean things that you grab with your hands.  These include doorknobs, remote controls, phones, and handles on your refrigerator and microwave. And don't forget countertops, tabletops, bathrooms, and computer keyboards. Current as of: May 8, 2020               Content Version: 12.5  © 2006-2020 Healthwise, [employer]. Care instructions adapted under license by [facility] ([facility]). If you have questions about a medical condition or this instruction, always ask your healthcare professional. [address] any warranty or liability for your use of this information. Patient Education        Learning About Coronavirus [phone])  Coronavirus [phone]): Overview  What is coronavirus ([id number])? The coronavirus disease (COVID-19) is caused by a virus. It is an illness that was first found in [facility], in December 2019. It has since spread worldwide. The virus can cause fever, cough, and trouble breathing. In severe cases, it can cause pneumonia and make it hard to breathe without help. It can cause death. Coronaviruses are a large group of viruses. They cause the common cold. They also cause more serious illnesses like Middle East respiratory syndrome (MERS) and severe acute respiratory syndrome (SARS). COVID-19 is caused by a novel coronavirus. That means it's a new type that has not been seen in people before. This virus spreads person-to-person through droplets from coughing and sneezing. It can also spread when you are close to someone who is infected. And it can spread when you touch something that has the virus on it, such as a doorknob or a tabletop. What can you do to protect yourself from coronavirus (COVID-19)? The best way to protect yourself from getting sick is to:  · Avoid areas where there is an outbreak. · Avoid contact with people who may be infected. · Wash your hands often with soap or alcohol-based hand sanitizers. · Avoid crowds and try to stay at least 6 feet away from other people.   · Wash your hands often, especially after you cough or sneeze. Use soap and water, and scrub for at least 20 seconds. If soap and water aren't available, use an alcohol-based hand . · Avoid touching your mouth, nose, and eyes. What can you do to avoid spreading the virus to others? To help avoid spreading the virus to others:  · Cover your mouth with a tissue when you cough or sneeze. Then throw the tissue in the trash. · Use a disinfectant to clean things that you touch often. · Wear a cloth face cover if you have to go to public areas. · Stay home if you are sick or have been exposed to the virus. Don't go to school, work, or public areas. And don't use public transportation, ride-shares, or taxis unless you have no choice. · If you are sick:  ? Leave your home only if you need to get medical care. But call the doctor's office first so they know you're coming. And wear a face cover. ? Wear the face cover whenever you're around other people. It can help stop the spread of the virus when you cough or sneeze. ? Clean and disinfect your home every day. Use household  and disinfectant wipes or sprays. Take special care to clean things that you grab with your hands. These include doorknobs, remote controls, phones, and handles on your refrigerator and microwave. And don't forget countertops, tabletops, bathrooms, and computer keyboards. When to call for help  [id number] anytime you think you may need emergency care. For example, call if:  · You have severe trouble breathing. (You can't talk at all.)  · You have constant chest pain or pressure. · You are severely dizzy or lightheaded. · You are confused or can't think clearly. · Your face and lips have a blue color. · You pass out (lose consciousness) or are very hard to wake up. Call your doctor now if you develop symptoms such as:  · Shortness of breath. · Fever. · Cough. If you need to get care, call ahead to the doctor's office for instructions before you go.  Make sure you wear a face cover to prevent exposing other people to the virus. Where can you get the latest information? The following health organizations are tracking and studying this virus. Their websites contain the most up-to-date information. [facility] also learn what to do if you think you may have been exposed to the virus. · U.S. Centers for Disease Control and Prevention (CDC): The CDC provides updated news about the disease and travel advice. The website also tells you how to prevent the spread of infection. www.cdc.gov  · World Health Organization [facility]): WHO offers information about the virus outbreaks. WHO also has travel advice. www.who.int  Current as of: May 8, 2020               Content Version: 12.5  © 2006-2020 Healthwise, Incorporated. Care instructions adapted under license by [facility] ([facility]). If you have questions about a medical condition or this instruction, always ask your healthcare professional. [address] any warranty or liability for your use of this information.

## 2020-07-14 NOTE — CARE COORDINATION
Patient contacted regarding Ramesh Sheriff. Discussed COVID-19 related testing which was pending at this time. Test results were pending. Patient informed of results, if available? Not available    Care Transition Nurse/ Ambulatory Care Manager contacted the patient by telephone to perform post discharge assessment. Verified name and  with patient as identifiers. Provided introduction to self, and explanation of the CTN/ACM role, and reason for call due to risk factors for infection and/or exposure to COVID-19. Symptoms reviewed with patient who verbalized the following symptoms: fatigue, pain or aching joints, cough and loss of taste or smell. Due to no new or worsening symptoms encounter was not routed to provider for escalation. Discussed follow-up appointments. If no appointment was previously scheduled, appointment scheduling offered: Patient states he followed up earlier today at [facility] [address] [place] [address] follow up appointment(s): No future appointments. Non-[facility] follow up appointment(s):       Patient has following risk factors of: no known risk factors. CTN/ACM reviewed discharge instructions, medical action plan and red flags such as increased shortness of breath, increasing fever and signs of decompensation with patient who verbalized understanding. Discussed exposure protocols and quarantine with CDC Guidelines What to do if you are sick with coronavirus disease .  Patient was given an opportunity for questions and concerns. The patient agrees to contact the [facility] exposure line [phone], local health department [place] [facility]: ([phone]) and PCP office for questions related to their healthcare. CTN/ACM provided contact information for future needs.     Reviewed and educated patient on any new and changed medications related to discharge diagnosis     Patient/family/caregiver given information for Allison Shell and agrees to enroll no  Patient's care. Do not go to work, school, or public areas. Avoid using public transportation, ride-sharing, or taxis. Separate yourself from other people and animals in your home  People: As much as possible, you should stay in a specific room and away from other people in your home. Also, you should use a separate bathroom, if available. Animals: You should restrict contact with pets and other animals while you are sick with COVID-19, just like you would around other people. Although there have not been reports of pets or other animals becoming sick with COVID-19, it is still recommended that people sick with COVID-19 limit contact with animals until more information is known about the virus. When possible, have another member of your household care for your animals while you are sick. If you are sick with COVID-19, avoid contact with your pet, including petting, snuggling, being kissed or licked, and sharing food. If you must care for your pet or be around animals while you are sick, wash your hands before and after you interact with pets and wear a facemask. Call ahead before visiting your doctor  If you have a medical appointment, call the healthcare provider and tell them that you have or may have COVID-19. This will help the healthcare providers office take steps to keep other people from getting infected or exposed. Wear a facemask  You should wear a facemask when you are around other people (e.g., sharing a room or vehicle) or pets and before you enter a healthcare providers office. If you are not able to wear a facemask (for example, because it causes trouble breathing), then people who live with you should not stay in the same room with you, or they should wear a facemask if they enter your room. Cover your coughs and sneezes  Cover your mouth and nose with a tissue when you cough or sneeze. Throw used tissues in a lined trash can.  Immediately wash your hands with soap and water for at least 20 seconds or, if soap and water are not available, clean your hands with an alcohol-based hand  that contains at least 60% alcohol. Clean your hands often  Wash your hands often with soap and water for at least 20 seconds, especially after blowing your nose, coughing, or sneezing; going to the bathroom; and before eating or preparing food. If soap and water are not readily available, use an alcohol-based hand  with at least 60% alcohol, covering all surfaces of your hands and rubbing them together until they feel dry. Soap and water are the best option if hands are visibly dirty. Avoid touching your eyes, nose, and mouth with unwashed hands. Avoid sharing personal household items  You should not share dishes, drinking glasses, cups, eating utensils, towels, or bedding with other people or pets in your home. After using these items, they should be washed thoroughly with soap and water. Clean all high-touch surfaces everyday  High touch surfaces include counters, tabletops, doorknobs, bathroom fixtures, toilets, phones, keyboards, tablets, and bedside tables. Also, clean any surfaces that may have blood, stool, or body fluids on them. Use a household cleaning spray or wipe, according to the label instructions. Labels contain instructions for safe and effective use of the cleaning product including precautions you should take when applying the product, such as wearing gloves and making sure you have good ventilation during use of the product. Monitor your symptoms  Seek prompt medical attention if your illness is worsening (e.g., difficulty breathing). Before seeking care, call your healthcare provider and tell them that you have, or are being evaluated for, COVID-19. Put on a facemask before you enter the facility. These steps will help the healthcare providers office to keep other people in the office or waiting room from getting infected or exposed.  Ask your healthcare provider to call the local or state health department. Persons who are placed under active monitoring or facilitated self-monitoring should follow instructions provided by their local health department or occupational health professionals, as appropriate. When working with your local health department check their available hours. If you have a medical emergency and need to call 911, notify the dispatch personnel that you have, or are being evaluated for COVID-19. If possible, put on a facemask before emergency medical services arrive. Discontinuing home isolation  Patients with confirmed COVID-19 should remain under home isolation precautions until the risk of secondary transmission to others is thought to be low. The decision to discontinue home isolation precautions should be made on a case-by-case basis, in consultation with healthcare providers and state and local health departments.

## 2020-07-19 LAB — SARS-COV-2, NAA: DETECTED

## 2020-07-20 ENCOUNTER — TELEPHONE (OUTPATIENT)
Dept: PRIMARY CARE CLINIC | Age: 50
End: 2020-07-20

## 2020-07-20 ENCOUNTER — TELEPHONE (OUTPATIENT)
Dept: ADMINISTRATIVE | Age: 50
End: 2020-07-20

## 2020-07-21 ENCOUNTER — CARE COORDINATION (OUTPATIENT)
Dept: CARE COORDINATION | Age: 50
End: 2020-07-21

## 2020-07-28 ENCOUNTER — CARE COORDINATION (OUTPATIENT)
Dept: CARE COORDINATION | Age: 50
End: 2020-07-28

## 2020-07-28 NOTE — CARE COORDINATION
You Patient resolved from the Care Transitions episode on 14 July 2020    Patient/family has been provided the following resources and education related to COVID-19:                         Signs, symptoms and red flags related to COVID-19            CDC exposure and quarantine guidelines            Conduit exposure contact - [phone]            Contact for their local Department of Health                 Patient currently reports that the following symptoms have improved:  fatigue, pain or aching joints and cough. Patient has not yet fully regained his senses of smell and taste but states they are improving. Patient reports he spoke with the [facility] Department last week and was told to remain self-quarantined through 26 July, then he could discontinues. Reviewed CDC guidelines with patient. No further outreach scheduled with this CTN/ACM. Episode of Care resolved. Patient has this CTN/ACM contact information if future needs arise.

## 2020-09-30 ENCOUNTER — HOSPITAL ENCOUNTER (OUTPATIENT)
Age: 50
Discharge: HOME | End: 2020-09-30
Payer: COMMERCIAL

## 2020-09-30 LAB
-: ABNORMAL
ABSOLUTE EOS #: <0.03 K/UL (ref 0–0.44)
ABSOLUTE IMMATURE GRANULOCYTE: <0.03 K/UL (ref 0–0.3)
ABSOLUTE LYMPH #: 0.89 K/UL (ref 1.1–3.7)
ABSOLUTE MONO #: 0.32 K/UL (ref 0.1–1.2)
AMORPHOUS: ABNORMAL
BACTERIA: ABNORMAL
BASOPHILS # BLD: 0 % (ref 0–2)
BASOPHILS ABSOLUTE: <0.03 K/UL (ref 0–0.2)
BILIRUBIN URINE: NEGATIVE
CASTS UA: ABNORMAL /LPF (ref 0–8)
COLOR: ABNORMAL
CRYSTALS, UA: ABNORMAL /HPF
DIFFERENTIAL TYPE: ABNORMAL
EOSINOPHILS RELATIVE PERCENT: 0 % (ref 1–4)
EPITHELIAL CELLS UA: ABNORMAL /HPF (ref 0–5)
GLUCOSE URINE: NEGATIVE
HCT VFR BLD CALC: 43.1 % (ref 40.7–50.3)
HEMOGLOBIN: 14.5 G/DL (ref 13–17)
IMMATURE GRANULOCYTES: 0 %
KETONES, URINE: ABNORMAL
LEUKOCYTE ESTERASE, URINE: ABNORMAL
LYMPHOCYTES # BLD: 28 % (ref 24–43)
MCH RBC QN AUTO: 32.6 PG (ref 25.2–33.5)
MCHC RBC AUTO-ENTMCNC: 33.6 G/DL (ref 28.4–34.8)
MCV RBC AUTO: 96.9 FL (ref 82.6–102.9)
MONOCYTES # BLD: 10 % (ref 3–12)
MUCUS: ABNORMAL
NITRITE, URINE: NEGATIVE
NRBC AUTOMATED: 0 PER 100 WBC
OTHER OBSERVATIONS UA: ABNORMAL
PDW BLD-RTO: 11.8 % (ref 11.8–14.4)
PH UA: 8 (ref 5–8)
PLATELET # BLD: 215 K/UL (ref 138–453)
PLATELET ESTIMATE: ABNORMAL
PMV BLD AUTO: 10.2 FL (ref 8.1–13.5)
PROTEIN UA: NEGATIVE
RBC # BLD: 4.45 M/UL (ref 4.21–5.77)
RBC # BLD: ABNORMAL 10*6/UL
RBC UA: ABNORMAL /HPF (ref 0–4)
RENAL EPITHELIAL, UA: ABNORMAL /HPF
SEG NEUTROPHILS: 62 % (ref 36–65)
SEGMENTED NEUTROPHILS ABSOLUTE COUNT: 1.99 K/UL (ref 1.5–8.1)
SPECIFIC GRAVITY UA: 1.02 (ref 1–1.03)
T. PALLIDUM, IGG: NONREACTIVE
TRICHOMONAS: ABNORMAL
TURBIDITY: CLEAR
URINE HGB: NEGATIVE
UROBILINOGEN, URINE: NORMAL
WBC # BLD: 3.2 K/UL (ref 3.5–11.3)
WBC # BLD: ABNORMAL 10*3/UL
WBC UA: ABNORMAL /HPF (ref 0–5)
YEAST: ABNORMAL

## 2020-09-30 PROCEDURE — 85025 COMPLETE CBC W/AUTO DIFF WBC: CPT

## 2020-09-30 PROCEDURE — 86694 HERPES SIMPLEX NES ANTBDY: CPT

## 2020-09-30 PROCEDURE — 36415 COLL VENOUS BLD VENIPUNCTURE: CPT

## 2020-09-30 PROCEDURE — 81001 URINALYSIS AUTO W/SCOPE: CPT

## 2020-09-30 PROCEDURE — 86696 HERPES SIMPLEX TYPE 2 TEST: CPT

## 2020-09-30 PROCEDURE — 86780 TREPONEMA PALLIDUM: CPT

## 2020-09-30 PROCEDURE — 86800 THYROGLOBULIN ANTIBODY: CPT

## 2020-09-30 PROCEDURE — 86695 HERPES SIMPLEX TYPE 1 TEST: CPT

## 2020-10-01 LAB
HERPES SIMPLEX VIRUS 1 IGG: 4.44
HERPES SIMPLEX VIRUS 2 IGG: 4.94
HERPES TYPE 1/2 IGM COMBINED: 0.23
THYROGLOBULIN AB: <20 IU/ML (ref 0–40)

## 2020-10-14 ENCOUNTER — OFFICE VISIT (OUTPATIENT)
Dept: GASTROENTEROLOGY | Age: 50
End: 2020-10-14
Payer: COMMERCIAL

## 2020-10-14 VITALS
WEIGHT: 141.9 LBS | OXYGEN SATURATION: 100 % | HEIGHT: 68 IN | HEART RATE: 70 BPM | SYSTOLIC BLOOD PRESSURE: 107 MMHG | BODY MASS INDEX: 21.5 KG/M2 | TEMPERATURE: 97 F | DIASTOLIC BLOOD PRESSURE: 60 MMHG

## 2020-10-14 PROCEDURE — G8420 CALC BMI NORM PARAMETERS: HCPCS | Performed by: INTERNAL MEDICINE

## 2020-10-14 PROCEDURE — 99214 OFFICE O/P EST MOD 30 MIN: CPT | Performed by: INTERNAL MEDICINE

## 2020-10-14 PROCEDURE — 1036F TOBACCO NON-USER: CPT | Performed by: INTERNAL MEDICINE

## 2020-10-14 PROCEDURE — G8484 FLU IMMUNIZE NO ADMIN: HCPCS | Performed by: INTERNAL MEDICINE

## 2020-10-14 PROCEDURE — G8427 DOCREV CUR MEDS BY ELIG CLIN: HCPCS | Performed by: INTERNAL MEDICINE

## 2020-10-14 ASSESSMENT — ENCOUNTER SYMPTOMS
SINUS PRESSURE: 0
DIARRHEA: 0
ABDOMINAL DISTENTION: 0
ANAL BLEEDING: 0
NAUSEA: 0
EYE PAIN: 0
COLOR CHANGE: 0
RECTAL PAIN: 0
BACK PAIN: 1
TROUBLE SWALLOWING: 1
WHEEZING: 0
ABDOMINAL PAIN: 0
BLOOD IN STOOL: 0
EYE REDNESS: 0
SHORTNESS OF BREATH: 0
SINUS PAIN: 0
VOMITING: 0
CONSTIPATION: 1
SORE THROAT: 0
CHEST TIGHTNESS: 0

## 2020-10-14 NOTE — PROGRESS NOTES
GI OFFICE FOLLOW UP    INTERVAL HISTORY:   Sabrina Hernandez, [address] [facility], [address] [facility]    Chief Complaint   Patient presents with    Weight Loss     Patient is here today due to weight loss. Patient stated that he is lossing weight without trying. Patient states that he was up to 200 lb  at one time. 1. Weight loss    2. Carcinoma of floor of mouth ([address]    3. Elevated CEA              HISTORY OF PRESENT ILLNESS: Benji Kaplan is a 52 y.o. male with a past history remarkable for weight loss, referred for evaluation of   Chief Complaint   Patient presents with    Weight Loss     Patient is here today due to weight loss. Patient stated that he is lossing weight without trying. Patient states that he was up to 200 lb  at one time. Patient seen with a history of weight loss. This is a new symptom. Last time patient was seen by me was 18 months ago. 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 Since then he is not able to swallow. He has a G-tube placed in the past.  At present G-tube is functioning reasonably well. He has been taking roughly 1500 keny a day. Bowel movements satisfactory. Denies abdominal pain, bloating, distention. No nausea vomiting. No fever chills  His previous records reviewed and last time he was seen by me was in January 2019. He also had a colonoscopy done in number of 2018 and at that time he was found to have a polyp in the rectum and histology revealed tubular adenoma. In the past there is a concern of increasing CEA level in 2018. This has been followed very PCP. Past Medical,Family, and Social History reviewed and does contribute to the patient presenting condition.     Patient's PMH/PSH,SH,PSYCH Hx, MEDs, ALLERGIES, and ROS were all reviewed and updated in the appropriate sections.     PAST MEDICAL HISTORY:  Past Medical History:   Diagnosis Date    Cancer [facility]) 2008    oral     Colon cancer screening     Colon polyps 11/26/2018    tubular adenoma    Uses feeding tube        Past Surgical History:   Procedure Laterality Date    COLONOSCOPY  11/26/2018    tubular adenoma    MANDIBLE RECONSTRUCTION         CURRENT MEDICATIONS:    Current Outpatient Medications:     acetaminophen (TYLENOL) 325 MG tablet, Take 1 tablet by mouth every 6 hours as needed for Pain, Disp: 60 tablet, Rfl: 0    aspirin EC 81 MG EC tablet, Take 2 tablets by mouth daily While having COVID symptoms, Disp: 45 tablet, Rfl: 0    ALLERGIES:   Allergies   Allergen Reactions    No Known Allergies        FAMILY HISTORY:       Problem Relation Age of Onset    High Blood Pressure Mother     Heart Disease Father          SOCIAL HISTORY:   Social History     Socioeconomic History    Marital status: Single     Spouse name: Not on file    Number of children: Not on file    Years of education: Not on file    Highest education level: Not on file   Occupational History    Not on file   Social Needs    Financial resource strain: Not on file    Food insecurity     Worry: Not on file     Inability: Not on file    Transportation needs     Medical: Not on file     Non-medical: Not on file   Tobacco Use    Smoking status: Former Smoker     Packs/day: 0.25     Years: 15.00     Pack years: 3.75     Last attempt to quit: 1/1/2005     Years since quitting: 15.7    Smokeless tobacco: Never Used   Substance and Sexual Activity    Alcohol use: Yes     Comment: weekly    Drug use: Yes     Types: Marijuana     Comment: Past hx    Sexual activity: Yes     Partners: Female   Lifestyle    Physical activity     Days per week: Not on file     Minutes per session: Not on file    Stress: Not on file   Relationships    Social connections     Talks on phone: Not on file     Gets together: Not on file     Attends [employer] service: Not on file     Active member of club or organization: Not on file     Attends meetings of clubs or organizations: Not on file     Relationship status: Not on file    Intimate partner violence     Fear of current or ex partner: Not on file     Emotionally abused: Not on file     Physically abused: Not on file     Forced sexual activity: Not on file   Other Topics Concern    Not on file   Social History Narrative    Not on file         REVIEW OF SYSTEMS:         Review of Systems   Constitutional: Positive for unexpected weight change (decreasing). Negative for appetite change and fatigue. HENT: Positive for trouble swallowing (feeding tube). Negative for congestion, sinus pressure, sinus pain and sore throat. Eyes: Negative for pain, redness and visual disturbance (glasses). Respiratory: Negative for chest tightness, shortness of breath and wheezing. Cardiovascular: Negative for chest pain, palpitations and leg swelling. Gastrointestinal: Positive for constipation. Negative for abdominal distention, abdominal pain, anal bleeding, blood in stool, diarrhea, nausea, rectal pain and vomiting. Endocrine: Negative for cold intolerance and heat intolerance. Genitourinary: Negative for difficulty urinating, frequency and urgency. Musculoskeletal: Positive for back pain and neck pain. Negative for arthralgias. Skin: Negative for color change. Allergic/Immunologic: Negative for environmental allergies and food allergies. Neurological: Negative for dizziness, weakness, light-headedness, numbness and headaches. Hematological: Does not bruise/bleed easily. Psychiatric/Behavioral: Negative for agitation and sleep disturbance. The patient is not nervous/anxious. PHYSICAL EXAMINATION: Vital signs reviewed per the nursing documentation.      /60   Pulse 70   Temp 97 °F (36.1 °C)   Ht 5' 8\" (1.727 m)   Wt 141 lb 14.4 oz (64.4 kg)   SpO2 100%   BMI 21.58 kg/m²   Body mass index is 21.58 kg/m². Physical Exam  Vitals signs and nursing note reviewed. Constitutional:       General: He is not in acute distress. Appearance: He is well-developed. Comments: Patient is thinly built. HENT:      Head: Normocephalic and atraumatic. Mouth/Throat:      Pharynx: No oropharyngeal exudate. Eyes:      General: No scleral icterus. Conjunctiva/sclera: Conjunctivae normal.      Pupils: Pupils are equal, round, and reactive to light. Neck:      Musculoskeletal: Normal range of motion and neck supple. Thyroid: No thyromegaly. Trachea: No tracheal deviation. Cardiovascular:      Rate and Rhythm: Normal rate and regular rhythm. Heart sounds: Normal heart sounds. No murmur. Pulmonary:      Effort: Pulmonary effort is normal. No respiratory distress. Breath sounds: Normal breath sounds. No wheezing or rales. Abdominal:      General: Bowel sounds are normal. There is no distension. Palpations: Abdomen is soft. There is no hepatomegaly or mass. Tenderness: There is no abdominal tenderness. There is no guarding. Hernia: No hernia is present. Comments: No peripheral signs of ch. Liver disease. G-tube site looks good. Fistula looks healthy. Unfortunately this patient has 25 Western Madonna G-tube that was placed under by different providers in the past.  Used to have 20 Western Madonna G-tube when it was not septic. Genitourinary:     Rectum: Normal.   Lymphadenopathy:      Cervical: No cervical adenopathy. Skin:     General: Skin is warm and dry. Findings: No erythema or rash. Neurological:      Mental Status: He is alert and oriented to person, place, and time. Cranial Nerves: No cranial nerve deficit. Psychiatric:         Thought Content:  Thought content normal.           LABORATORY DATA: Reviewed  Lab Results   Component Value Date    WBC 3.2 (L) 09/30/2020    HGB 14.5 09/30/2020    HCT 43.1 09/30/2020    MCV 96.9 09/30/2020  09/30/2020     06/05/2020    K 4.0 06/05/2020     06/05/2020    CO2 23 06/05/2020    BUN 16 06/05/2020    CREATININE 0.47 (L) 06/05/2020    LABALBU 4.1 06/05/2020    BILITOT 0.50 06/05/2020    ALKPHOS 81 06/05/2020    AST 24 06/05/2020    ALT 17 06/05/2020         Lab Results   Component Value Date    RBC 4.45 09/30/2020    HGB 14.5 09/30/2020    MCV 96.9 09/30/2020    MCH 32.6 09/30/2020    MCHC 33.6 09/30/2020    RDW 11.8 09/30/2020    MPV 10.2 09/30/2020    BASOPCT 0 09/30/2020    LYMPHSABS 0.89 (L) 09/30/2020    MONOSABS 0.32 09/30/2020    NEUTROABS 1.99 09/30/2020    EOSABS <0.03 09/30/2020    BASOSABS <0.03 09/30/2020         DIAGNOSTIC TESTING:     No results found. Assessment  1. Weight loss    2. Carcinoma of floor of mouth ([address]    3. Elevated CEA        Plan  Advised of the CEA level, TSH level. To increase feedings to 2000 keny a day./1-1/2 can 5 times a day. To see me in the next 2 months and at the time basing on his weight will decide further management. Thank you for allowing me to participate in the care of Mr. Mansi Coy. For any further questions please do not hesitate to contact me. I have reviewed and agree with the ROS entered by the MA/LPN.          Nima Still MD,FACP, [facility]  Board Certified in Gastroenterology and [address] [place] [address] Gastroenterology  Office #: [phone]

## 2020-12-16 ENCOUNTER — OFFICE VISIT (OUTPATIENT)
Dept: GASTROENTEROLOGY | Age: 50
End: 2020-12-16
Payer: COMMERCIAL

## 2020-12-16 VITALS
OXYGEN SATURATION: 98 % | TEMPERATURE: 96.9 F | WEIGHT: 143.9 LBS | HEART RATE: 54 BPM | HEIGHT: 68 IN | BODY MASS INDEX: 21.81 KG/M2 | SYSTOLIC BLOOD PRESSURE: 101 MMHG | DIASTOLIC BLOOD PRESSURE: 61 MMHG

## 2020-12-16 PROCEDURE — G8427 DOCREV CUR MEDS BY ELIG CLIN: HCPCS | Performed by: INTERNAL MEDICINE

## 2020-12-16 PROCEDURE — 1036F TOBACCO NON-USER: CPT | Performed by: INTERNAL MEDICINE

## 2020-12-16 PROCEDURE — G8420 CALC BMI NORM PARAMETERS: HCPCS | Performed by: INTERNAL MEDICINE

## 2020-12-16 PROCEDURE — G8484 FLU IMMUNIZE NO ADMIN: HCPCS | Performed by: INTERNAL MEDICINE

## 2020-12-16 PROCEDURE — 99213 OFFICE O/P EST LOW 20 MIN: CPT | Performed by: INTERNAL MEDICINE

## 2020-12-16 PROCEDURE — 3017F COLORECTAL CA SCREEN DOC REV: CPT | Performed by: INTERNAL MEDICINE

## 2020-12-16 ASSESSMENT — ENCOUNTER SYMPTOMS
ANAL BLEEDING: 0
TROUBLE SWALLOWING: 1
VOMITING: 0
ABDOMINAL PAIN: 1
DIARRHEA: 0
WHEEZING: 0
RECTAL PAIN: 0
BLOOD IN STOOL: 0
SHORTNESS OF BREATH: 0
ABDOMINAL DISTENTION: 0
BACK PAIN: 1
SINUS PRESSURE: 0
SORE THROAT: 0
CONSTIPATION: 1
CHEST TIGHTNESS: 0
EYE REDNESS: 0
NAUSEA: 0
COLOR CHANGE: 0
SINUS PAIN: 0
EYE PAIN: 0

## 2020-12-16 NOTE — PROGRESS NOTES
GI OFFICE FOLLOW UP    INTERVAL HISTORY:   No referring provider defined for this encounter. Chief Complaint   Patient presents with    Follow-up     Patient is here today for a 2 month f/u       1. Weight loss    2. Elevated CEA    3. Carcinoma of floor of mouth ([address]              HISTORY OF PRESENT ILLNESS: Bhavna Torres is a 48 y.o. male with a past history remarkable for carcinoma of the floor of the mouth,   Patient seen for follow-up of weight loss. At present he is taking 4 to 5 cans of tube feedings per day. In the past he used to take 4 cans. Looks like he gained about 45 pounds. He has a history of carcinoma of the mouth for which he had adequate treatment in the past.  He is not able to take oral feedings. Denies abdominal pain, bloating, nausea vomiting. Bowel movements satisfactory. He is known to have progressively elevated CEA level. He was advised to have repeat labs done including CEA, patient stated that he forgot about the labs. Past Medical,Family, and Social History reviewed and does contribute to the patient presenting condition. Patient's PMH/PSH,SH,PSYCH Hx, MEDs, ALLERGIES, and ROS were all reviewed and updated in the appropriate sections.  Yes      PAST MEDICAL HISTORY:  Past Medical History:   Diagnosis Date    Cancer [facility]) 2008    oral     Colon cancer screening     Colon polyps 11/26/2018    tubular adenoma    Uses feeding tube        Past Surgical History:   Procedure Laterality Date    COLONOSCOPY  11/26/2018    tubular adenoma    MANDIBLE RECONSTRUCTION         CURRENT MEDICATIONS:    Current Outpatient Medications:     acetaminophen (TYLENOL) 325 MG tablet, Take 1 tablet by mouth every 6 hours as needed for Pain, Disp: 60 tablet, Rfl: 0    aspirin EC 81 MG EC tablet, Take 2 tablets by mouth daily While having COVID symptoms, Disp: 45 tablet, Rfl: 0    ALLERGIES:   Allergies   Allergen Reactions    No Known Allergies        FAMILY HISTORY:       Problem Relation Age of Onset    High Blood Pressure Mother     Heart Disease Father          SOCIAL HISTORY:   Social History     Socioeconomic History    Marital status: Single     Spouse name: Not on file    Number of children: Not on file    Years of education: Not on file    Highest education level: Not on file   Occupational History    Not on file   Social Needs    Financial resource strain: Not on file    Food insecurity     Worry: Not on file     Inability: Not on file    Transportation needs     Medical: Not on file     Non-medical: Not on file   Tobacco Use    Smoking status: Former Smoker     Packs/day: 0.25     Years: 15.00     Pack years: 3.75     Quit date: 1/1/2005     Years since quitting: 15.9    Smokeless tobacco: Never Used   Substance and Sexual Activity    Alcohol use: Yes     Comment: weekly    Drug use: Yes     Types: Marijuana     Comment: Past hx    Sexual activity: Yes     Partners: Female   Lifestyle    Physical activity     Days per week: Not on file     Minutes per session: Not on file    Stress: Not on file   Relationships    Social connections     Talks on phone: Not on file     Gets together: Not on file     Attends [employer] service: Not on file     Active member of club or organization: Not on file     Attends meetings of clubs or organizations: Not on file     Relationship status: Not on file    Intimate partner violence     Fear of current or ex partner: Not on file     Emotionally abused: Not on file     Physically abused: Not on file     Forced sexual activity: Not on file   Other Topics Concern    Not on file   Social History Narrative    Not on file         REVIEW OF SYSTEMS:         Review of Systems   Constitutional: Positive for unexpected weight change (decreasing). Negative for appetite change and fatigue.    HENT: Positive for trouble swallowing (feeding tube). Negative for congestion, sinus pressure, sinus pain and sore throat. Eyes: Negative for pain, redness and visual disturbance (glasses). Respiratory: Negative for chest tightness, shortness of breath and wheezing. Cardiovascular: Negative for chest pain, palpitations and leg swelling. Gastrointestinal: Positive for abdominal pain and constipation. Negative for abdominal distention, anal bleeding, blood in stool, diarrhea, nausea, rectal pain and vomiting. Endocrine: Negative for cold intolerance and heat intolerance. Genitourinary: Negative for difficulty urinating, frequency and urgency. Musculoskeletal: Positive for back pain and neck pain. Negative for arthralgias. Skin: Negative for color change. Allergic/Immunologic: Negative for environmental allergies and food allergies. Neurological: Negative for dizziness, weakness, light-headedness, numbness and headaches. Hematological: Does not bruise/bleed easily. Psychiatric/Behavioral: Negative for agitation and sleep disturbance. The patient is not nervous/anxious. PHYSICAL EXAMINATION:     Vital signs reviewed per the nursing documentation. /61   Pulse 54   Temp 96.9 °F (36.1 °C)   Ht 5' 8\" (1.727 m)   Wt 143 lb 14.4 oz (65.3 kg)   SpO2 98%   BMI 21.88 kg/m²   Body mass index is 21.88 kg/m². Physical Exam  Vitals signs and nursing note reviewed. Constitutional:       General: He is not in acute distress. Appearance: He is well-developed. Comments: Mumbling speech. Appears to have satisfactory nutritional status. HENT:      Head: Normocephalic and atraumatic. Mouth/Throat:      Pharynx: No oropharyngeal exudate. Eyes:      General: No scleral icterus. Conjunctiva/sclera: Conjunctivae normal.      Pupils: Pupils are equal, round, and reactive to light. Neck:      Musculoskeletal: Normal range of motion and neck supple. Thyroid: No thyromegaly. Trachea: No tracheal deviation. Cardiovascular:      Rate and Rhythm: Normal rate and regular rhythm. Heart sounds: Normal heart sounds. No murmur. Pulmonary:      Effort: Pulmonary effort is normal. No respiratory distress. Breath sounds: Normal breath sounds. No wheezing or rales. Abdominal:      General: Bowel sounds are normal. There is no distension. Palpations: Abdomen is soft. There is no hepatomegaly or mass. Tenderness: There is no abdominal tenderness. There is no guarding. Hernia: No hernia is present. Comments: No peripheral signs of ch. Liver disease, G-tube site looks good. Genitourinary:     Rectum: Normal.   Lymphadenopathy:      Cervical: No cervical adenopathy. Skin:     General: Skin is warm and dry. Findings: No erythema or rash. Neurological:      Mental Status: He is alert and oriented to person, place, and time. Cranial Nerves: No cranial nerve deficit. Psychiatric:         Thought Content: Thought content normal.           LABORATORY DATA: Reviewed  Lab Results   Component Value Date    WBC 3.2 (L) 09/30/2020    HGB 14.5 09/30/2020    HCT 43.1 09/30/2020    MCV 96.9 09/30/2020     09/30/2020     06/05/2020    K 4.0 06/05/2020     06/05/2020    CO2 23 06/05/2020    BUN 16 06/05/2020    CREATININE 0.47 (L) 06/05/2020    LABALBU 4.1 06/05/2020    BILITOT 0.50 06/05/2020    ALKPHOS 81 06/05/2020    AST 24 06/05/2020    ALT 17 06/05/2020         Lab Results   Component Value Date    RBC 4.45 09/30/2020    HGB 14.5 09/30/2020    MCV 96.9 09/30/2020    MCH 32.6 09/30/2020    MCHC 33.6 09/30/2020    RDW 11.8 09/30/2020    MPV 10.2 09/30/2020    BASOPCT 0 09/30/2020    LYMPHSABS 0.89 (L) 09/30/2020    MONOSABS 0.32 09/30/2020    NEUTROABS 1.99 09/30/2020    EOSABS <0.03 09/30/2020    BASOSABS <0.03 09/30/2020         DIAGNOSTIC TESTING:     No results found. Assessment  1. Weight loss    2. Elevated CEA    3.  Carcinoma of

## 2021-02-07 ENCOUNTER — HOSPITAL ENCOUNTER (EMERGENCY)
Age: 51
Discharge: HOME | End: 2021-02-07
Attending: EMERGENCY MEDICINE
Payer: COMMERCIAL

## 2021-02-07 ENCOUNTER — APPOINTMENT (OUTPATIENT)
Dept: GENERAL RADIOLOGY | Age: 51
End: 2021-02-07
Payer: COMMERCIAL

## 2021-02-07 VITALS
DIASTOLIC BLOOD PRESSURE: 78 MMHG | HEART RATE: 70 BPM | RESPIRATION RATE: 16 BRPM | OXYGEN SATURATION: 100 % | SYSTOLIC BLOOD PRESSURE: 125 MMHG | TEMPERATURE: 96.1 F

## 2021-02-07 DIAGNOSIS — Z43.1 ATTENTION TO G-TUBE (HCC): Primary | ICD-10-CM

## 2021-02-07 PROCEDURE — 49465 FLUORO EXAM OF G/COLON TUBE: CPT

## 2021-02-07 PROCEDURE — 6360000004 HC RX CONTRAST MEDICATION: Performed by: STUDENT IN AN ORGANIZED HEALTH CARE EDUCATION/TRAINING PROGRAM

## 2021-02-07 PROCEDURE — 99283 EMERGENCY DEPT VISIT LOW MDM: CPT

## 2021-02-07 RX ADMIN — DIATRIZOATE MEGLUMINE AND DIATRIZOATE SODIUM 30 ML: 660; 100 LIQUID ORAL; RECTAL at 12:00

## 2021-02-07 ASSESSMENT — ENCOUNTER SYMPTOMS
SHORTNESS OF BREATH: 0
COLOR CHANGE: 0
VOMITING: 0
NAUSEA: 0
ABDOMINAL PAIN: 0
DIARRHEA: 0

## 2021-02-07 NOTE — ED PROVIDER NOTES
[facility] ED  Emergency Department Encounter  EmergencyMedicine Resident     Pt Kimber Mayberry  MRN: [id number]  [place] 1970  Date of evaluation: 21  PCP:  QIAN Azar 1329       Chief Complaint   Patient presents with    G Tube Complications     pt states that he awoke this morning with his gtube in the bed and had fallen out. pt has had tube x 12 years. no redness or irritation noted around area. pt denies pain. HISTORY OF PRESENT ILLNESS  (Location/Symptom, Timing/Onset, Context/Setting, Quality, Duration, Modifying Factors, Severity.)      Leesa Cooks is a 48 y.o. male who presents with complaint of G-tube falling out overnight. Patient states that he is unsure exactly how this occurred he woke up this morning and his G-tube is out. Patient has a G-tube in place for last 10 to 12 years secondary to oral cancer. It is a an 25 Western Madonna size 7 to 10 mL. Pain no infection at the site no nausea or vomiting    PAST MEDICAL / SURGICAL / SOCIAL / FAMILY HISTORY      has a past medical history of Cancer [facility]), Colon cancer screening, Colon polyps, and Uses feeding tube. has a past surgical history that includes Mandible reconstruction and Colonoscopy (2018).       Social History     Socioeconomic History    Marital status: Single     Spouse name: Not on file    Number of children: Not on file    Years of education: Not on file    Highest education level: Not on file   Occupational History    Not on file   Social Needs    Financial resource strain: Not on file    Food insecurity     Worry: Not on file     Inability: Not on file    Transportation needs     Medical: Not on file     Non-medical: Not on file   Tobacco Use    Smoking status: Former Smoker     Packs/day: 0.25     Years: 15.00     Pack years: 3.75     Quit date: 2005     Years since quittin.1    Smokeless tobacco: Never Used   Substance and Sexual Activity    Alcohol use: Yes     Comment: weekly    Drug use: Yes     Types: Marijuana     Comment: Past hx    Sexual activity: Yes     Partners: Female   Lifestyle    Physical activity     Days per week: Not on file     Minutes per session: Not on file    Stress: Not on file   Relationships    Social connections     Talks on phone: Not on file     Gets together: Not on file     Attends [employer] service: Not on file     Active member of club or organization: Not on file     Attends meetings of clubs or organizations: Not on file     Relationship status: Not on file    Intimate partner violence     Fear of current or ex partner: Not on file     Emotionally abused: Not on file     Physically abused: Not on file     Forced sexual activity: Not on file   Other Topics Concern    Not on file   Social History Narrative    Not on file       Family History   Problem Relation Age of Onset    High Blood Pressure Mother     Heart Disease Father        Allergies:  No known allergies    Home Medications:  Prior to Admission medications    Medication Sig Start Date End Date Taking? Authorizing Provider   acetaminophen (TYLENOL) 325 MG tablet Take 1 tablet by mouth every 6 hours as needed for Pain 7/13/20   Filippo Goins MD   aspirin EC 81 MG EC tablet Take 2 tablets by mouth daily While having COVID symptoms 7/13/20   Filippo Goins MD       REVIEW OF SYSTEMS    (2-9 systems for level 4, 10 or more for level 5)      Review of Systems   Constitutional: Negative for activity change, chills and fever. Respiratory: Negative for shortness of breath. Cardiovascular: Negative for chest pain. Gastrointestinal: Negative for abdominal pain, diarrhea, nausea and vomiting. Musculoskeletal: Negative for gait problem. Skin: Negative for color change. Psychiatric/Behavioral: Negative for agitation.        PHYSICAL EXAM   (up to 7 for level 4, 8 or more for level 5)      INITIAL VITALS:   /78   Pulse 70   Temp 96.1 °F (35.6 °C)   Resp 16   SpO2 100%     Physical Exam  Vitals signs and nursing note reviewed. Constitutional:       General: He is not in acute distress. Appearance: He is normal weight. He is not ill-appearing, toxic-appearing or diaphoretic. HENT:      Head: Atraumatic. Right Ear: External ear normal.      Left Ear: External ear normal.      Nose: Nose normal.      Mouth/Throat:      Mouth: Mucous membranes are moist.   Eyes:      General:         Right eye: No discharge. Left eye: No discharge. Neck:      Musculoskeletal: Normal range of motion. Cardiovascular:      Rate and Rhythm: Normal rate. Pulses: Normal pulses. Abdominal:      Palpations: Abdomen is soft. Musculoskeletal: Normal range of motion. Skin:     General: Skin is warm. Capillary Refill: Capillary refill takes less than 2 seconds. Neurological:      Mental Status: He is alert and oriented to person, place, and time. Psychiatric:         Mood and Affect: Mood normal.         DIFFERENTIAL  DIAGNOSIS     PLAN (LABS / IMAGING / EKG):  Orders Placed This Encounter   Procedures    XR INJ CONTRAST GASTRIC TUBE PERC       MEDICATIONS ORDERED:  Orders Placed This Encounter   Medications    DISCONTD: diatrizoate meglumine-sodium (GASTROGRAFIN) 66-10 % solution 20 mL    diatrizoate meglumine-sodium (GASTROGRAFIN) 66-10 % solution 30 mL       DDX: g tube malfunction    DIAGNOSTIC RESULTS / EMERGENCY DEPARTMENT COURSE / MDM   LAB RESULTS:  No results found for this visit on 02/07/21. IMPRESSION: Oriented nontoxic 54-year-old male in no acute distress complaining of his G-tube falling out over the last 24 hours. No pain at the site no nausea vomiting does have a G-tube for 10 to 12 years track maturation.   No fevers plan will be for G-tube replacement and discharged with PCP follow-up    RADIOLOGY:  none    EKG  none    All EKG's are interpreted by the Emergency Department Physician who either signs or

## 2021-02-07 NOTE — ED PROVIDER NOTES
recognition program. Efforts were made to edit the dictations but occasionally words are mis-transcribed.)             Candelaria oCto MD  02/07/21 [phone]

## 2021-03-16 ENCOUNTER — TELEPHONE (OUTPATIENT)
Dept: GASTROENTEROLOGY | Age: 51
End: 2021-03-16

## 2021-03-16 NOTE — TELEPHONE ENCOUNTER
Writer spoke with patient about missed appointment with Dr. Rogelio Villagran. Patient will call back to reschedule after he has his lab tests completed.

## 2021-04-09 ENCOUNTER — HOSPITAL ENCOUNTER (OUTPATIENT)
Age: 51
Discharge: HOME | End: 2021-04-09
Payer: COMMERCIAL

## 2021-04-09 DIAGNOSIS — R63.4 WEIGHT LOSS: ICD-10-CM

## 2021-04-09 DIAGNOSIS — C04.9 CARCINOMA OF FLOOR OF MOUTH (HCC): ICD-10-CM

## 2021-04-09 DIAGNOSIS — R97.0 ELEVATED CEA: ICD-10-CM

## 2021-04-09 LAB
ABSOLUTE EOS #: <0.03 K/UL (ref 0–0.44)
ABSOLUTE IMMATURE GRANULOCYTE: <0.03 K/UL (ref 0–0.3)
ABSOLUTE LYMPH #: 1.15 K/UL (ref 1.1–3.7)
ABSOLUTE MONO #: 0.26 K/UL (ref 0.1–1.2)
ALBUMIN SERPL-MCNC: 4.5 G/DL (ref 3.5–5.2)
ALBUMIN/GLOBULIN RATIO: 1.5 (ref 1–2.5)
ALP BLD-CCNC: 86 U/L (ref 40–129)
ALT SERPL-CCNC: 20 U/L (ref 5–41)
ANION GAP SERPL CALCULATED.3IONS-SCNC: 9 MMOL/L (ref 9–17)
AST SERPL-CCNC: 31 U/L
BASOPHILS # BLD: 0 % (ref 0–2)
BASOPHILS ABSOLUTE: <0.03 K/UL (ref 0–0.2)
BILIRUB SERPL-MCNC: 0.56 MG/DL (ref 0.3–1.2)
BUN BLDV-MCNC: 16 MG/DL (ref 6–20)
BUN/CREAT BLD: ABNORMAL (ref 9–20)
CALCIUM SERPL-MCNC: 9.3 MG/DL (ref 8.6–10.4)
CARCINOEMBRYONIC ANTIGEN: 5.7 NG/ML
CHLORIDE BLD-SCNC: 105 MMOL/L (ref 98–107)
CO2: 28 MMOL/L (ref 20–31)
CREAT SERPL-MCNC: 0.42 MG/DL (ref 0.7–1.2)
DIFFERENTIAL TYPE: ABNORMAL
EOSINOPHILS RELATIVE PERCENT: 1 % (ref 1–4)
GFR AFRICAN AMERICAN: >60 ML/MIN
GFR NON-AFRICAN AMERICAN: >60 ML/MIN
GFR SERPL CREATININE-BSD FRML MDRD: ABNORMAL ML/MIN/{1.73_M2}
GFR SERPL CREATININE-BSD FRML MDRD: ABNORMAL ML/MIN/{1.73_M2}
GLUCOSE BLD-MCNC: 69 MG/DL (ref 70–99)
HCT VFR BLD CALC: 43.2 % (ref 40.7–50.3)
HEMOGLOBIN: 14.4 G/DL (ref 13–17)
IMMATURE GRANULOCYTES: 0 %
LIPASE: 36 U/L (ref 13–60)
LYMPHOCYTES # BLD: 39 % (ref 24–43)
MCH RBC QN AUTO: 32.6 PG (ref 25.2–33.5)
MCHC RBC AUTO-ENTMCNC: 33.3 G/DL (ref 28.4–34.8)
MCV RBC AUTO: 97.7 FL (ref 82.6–102.9)
MONOCYTES # BLD: 9 % (ref 3–12)
NRBC AUTOMATED: 0 PER 100 WBC
PDW BLD-RTO: 11.1 % (ref 11.8–14.4)
PLATELET # BLD: 217 K/UL (ref 138–453)
PLATELET ESTIMATE: ABNORMAL
PMV BLD AUTO: 10.8 FL (ref 8.1–13.5)
POTASSIUM SERPL-SCNC: 3.9 MMOL/L (ref 3.7–5.3)
RBC # BLD: 4.42 M/UL (ref 4.21–5.77)
RBC # BLD: ABNORMAL 10*6/UL
SEG NEUTROPHILS: 51 % (ref 36–65)
SEGMENTED NEUTROPHILS ABSOLUTE COUNT: 1.54 K/UL (ref 1.5–8.1)
SODIUM BLD-SCNC: 142 MMOL/L (ref 135–144)
TOTAL PROTEIN: 7.6 G/DL (ref 6.4–8.3)
TSH SERPL DL<=0.05 MIU/L-ACNC: 0.69 MIU/L (ref 0.3–5)
WBC # BLD: 3 K/UL (ref 3.5–11.3)
WBC # BLD: ABNORMAL 10*3/UL

## 2021-04-09 PROCEDURE — 80053 COMPREHEN METABOLIC PANEL: CPT

## 2021-04-09 PROCEDURE — 83690 ASSAY OF LIPASE: CPT

## 2021-04-09 PROCEDURE — 36415 COLL VENOUS BLD VENIPUNCTURE: CPT

## 2021-04-09 PROCEDURE — 84443 ASSAY THYROID STIM HORMONE: CPT

## 2021-04-09 PROCEDURE — 85025 COMPLETE CBC W/AUTO DIFF WBC: CPT

## 2021-04-09 PROCEDURE — 82378 CARCINOEMBRYONIC ANTIGEN: CPT

## 2021-05-13 ENCOUNTER — APPOINTMENT (OUTPATIENT)
Dept: GENERAL RADIOLOGY | Age: 51
End: 2021-05-13
Payer: COMMERCIAL

## 2021-05-13 ENCOUNTER — HOSPITAL ENCOUNTER (EMERGENCY)
Age: 51
Discharge: HOME | End: 2021-05-13
Attending: EMERGENCY MEDICINE
Payer: COMMERCIAL

## 2021-05-13 VITALS
SYSTOLIC BLOOD PRESSURE: 127 MMHG | RESPIRATION RATE: 18 BRPM | DIASTOLIC BLOOD PRESSURE: 81 MMHG | OXYGEN SATURATION: 100 % | TEMPERATURE: 97.4 F | HEART RATE: 92 BPM

## 2021-05-13 DIAGNOSIS — K59.00 CONSTIPATION, UNSPECIFIED CONSTIPATION TYPE: Primary | ICD-10-CM

## 2021-05-13 PROCEDURE — 6370000000 HC RX 637 (ALT 250 FOR IP): Performed by: STUDENT IN AN ORGANIZED HEALTH CARE EDUCATION/TRAINING PROGRAM

## 2021-05-13 PROCEDURE — 74018 RADEX ABDOMEN 1 VIEW: CPT

## 2021-05-13 PROCEDURE — 99284 EMERGENCY DEPT VISIT MOD MDM: CPT

## 2021-05-13 RX ORDER — DOCUSATE SODIUM 100 MG/1
100 CAPSULE, LIQUID FILLED ORAL 2 TIMES DAILY
Qty: 28 CAPSULE | Refills: 0 | Status: SHIPPED | OUTPATIENT
Start: 2021-05-13 | End: 2021-05-14

## 2021-05-13 RX ORDER — POLYETHYLENE GLYCOL 3350 17 G/17G
17 POWDER, FOR SOLUTION ORAL DAILY
Qty: 1 BOTTLE | Refills: 0 | Status: SHIPPED | OUTPATIENT
Start: 2021-05-13 | End: 2021-05-14 | Stop reason: SDUPTHER

## 2021-05-13 RX ORDER — SODIUM PHOSPHATE, DIBASIC AND SODIUM PHOSPHATE, MONOBASIC 7; 19 G/133ML; G/133ML
1 ENEMA RECTAL
Qty: 1 BOTTLE | Refills: 0 | Status: SHIPPED | OUTPATIENT
Start: 2021-05-13 | End: 2021-05-13

## 2021-05-13 RX ORDER — POLYETHYLENE GLYCOL 3350 17 G/17G
17 POWDER, FOR SOLUTION ORAL ONCE
Status: COMPLETED | OUTPATIENT
Start: 2021-05-13 | End: 2021-05-13

## 2021-05-13 RX ADMIN — DOCUSATE SODIUM 100 MG: 50 LIQUID ORAL at 16:26

## 2021-05-13 RX ADMIN — POLYETHYLENE GLYCOL 3350 17 G: 17 POWDER, FOR SOLUTION ORAL at 15:10

## 2021-05-13 ASSESSMENT — ENCOUNTER SYMPTOMS
SHORTNESS OF BREATH: 0
NAUSEA: 0
CONSTIPATION: 1
VOMITING: 0
ABDOMINAL PAIN: 0

## 2021-05-13 NOTE — ED PROVIDER NOTES
[facility] ED  Emergency Department Encounter  Emergency Medicine Resident     Pt Name: Nathan Myers  MRN: [id number]  [place] 1970  Date of evaluation: 21  PCP:  QIAN Hdz CNP    CHIEF COMPLAINT       Chief Complaint   Patient presents with    Constipation     last bowel movement 6 days ago       HISTORY OFPRESENT ILLNESS  (Location/Symptom, Timing/Onset, Context/Setting, Quality, Duration, Modifying Factors,Severity.)      Nathan Myers is a 48year old male who presents with constipation. The patient reports that his last bowel movement was 5/4. He denies any abdominal pain or nausea/vomiting. He took some over-the-counter medication several days ago which did not help. Patient reports that he does not believe he is passing gas. The patient has a G-tube secondary to oral cancer and G-tube was placed 10 to 12 years ago. He had it changed in our emergency department in February. Reports that he still able to take his feeds which are 4 times a day. No other surgeries on the abdomen. PAST MEDICAL / SURGICAL / SOCIAL / FAMILY HISTORY      has a past medical history of Cancer [facility]), Colon cancer screening, Colon polyps, and Uses feeding tube. has a past surgical history that includes Mandible reconstruction and Colonoscopy (2018).      Social History     Socioeconomic History    Marital status: Single     Spouse name: Not on file    Number of children: Not on file    Years of education: Not on file    Highest education level: Not on file   Occupational History    Not on file   Social Needs    Financial resource strain: Not on file    Food insecurity     Worry: Not on file     Inability: Not on file    Transportation needs     Medical: Not on file     Non-medical: Not on file   Tobacco Use    Smoking status: Former Smoker     Packs/day: 0.25     Years: 15.00     Pack years: 3.75     Quit date: 2005     Years since quittin.3    Smokeless tobacco: Never Used   Substance and Sexual Activity    Alcohol use: Yes     Comment: weekly    Drug use: Yes     Types: Marijuana     Comment: Past hx    Sexual activity: Yes     Partners: Female   Lifestyle    Physical activity     Days per week: Not on file     Minutes per session: Not on file    Stress: Not on file   Relationships    Social connections     Talks on phone: Not on file     Gets together: Not on file     Attends Druze service: Not on file     Active member of club or organization: Not on file     Attends meetings of clubs or organizations: Not on file     Relationship status: Not on file    Intimate partner violence     Fear of current or ex partner: Not on file     Emotionally abused: Not on file     Physically abused: Not on file     Forced sexual activity: Not on file   Other Topics Concern    Not on file   Social History Narrative    Not on file       Family History   Problem Relation Age of Onset    High Blood Pressure Mother     Heart Disease Father         Allergies:  No known allergies    Home Medications:  Prior to Admission medications    Medication Sig Start Date End Date Taking?  Authorizing Provider   polyethylene glycol (MIRALAX) 17 GM/SCOOP powder Take 17 g by mouth daily for 7 days PRN constipation 5/13/21 5/20/21 Yes Susanna Tao MD   docusate sodium (COLACE) 100 MG capsule Take 1 capsule by mouth 2 times daily for 14 days 5/13/21 5/27/21 Yes Susanna Tao MD   Sodium Phosphates (FLEET) 7-19 GM/118ML Place 1 enema rectally once as needed (constipation) 5/13/21 5/13/21 Yes Susanna Tao MD   acetaminophen (TYLENOL) 325 MG tablet Take 1 tablet by mouth every 6 hours as needed for Pain 7/13/20   Farshad Padilla MD   aspirin EC 81 MG EC tablet Take 2 tablets by mouth daily While having COVID symptoms 7/13/20   Farshad Padilla MD       REVIEW OFSYSTEMS    (2-9 systems for level 4, 10 or more for level 5)      Review of Systems   Constitutional: Negative for chills and fever.   Respiratory: Negative for shortness of breath. Cardiovascular: Negative for chest pain. Gastrointestinal: Positive for constipation. Negative for abdominal pain, nausea and vomiting. Genitourinary: Negative for decreased urine volume and dysuria. Skin: Negative for wound. Neurological: Negative for light-headedness and numbness. PHYSICAL EXAM   (up to 7 for level 4, 8 or more forlevel 5)      INITIAL VITALS:   ED Triage Vitals [05/13/21 1343]   BP Temp Temp Source Pulse Resp SpO2 Height Weight   127/81 97.4 °F (36.3 °C) Temporal 92 18 100 % -- --       Physical Exam  Constitutional:       General: He is not in acute distress. Appearance: He is not diaphoretic. Cardiovascular:      Rate and Rhythm: Normal rate and regular rhythm. Pulmonary:      Effort: Pulmonary effort is normal.      Breath sounds: Normal breath sounds. Abdominal:      General: Bowel sounds are normal. There is no distension. Palpations: Abdomen is soft. Tenderness: There is no abdominal tenderness. There is no guarding. Comments: G tube in place with no surrounding erythema, edema or drainage   Skin:     General: Skin is warm. Neurological:      Mental Status: He is alert.          DIFFERENTIAL  DIAGNOSIS     PLAN (LABS / IMAGING / EKG):  Orders Placed This Encounter   Procedures    XR ABDOMEN (KUB) (SINGLE AP VIEW)       MEDICATIONS ORDERED:  Orders Placed This Encounter   Medications    polyethylene glycol (GLYCOLAX) packet 17 g    docusate (COLACE) 50 MG/5ML liquid 100 mg    polyethylene glycol (MIRALAX) 17 GM/SCOOP powder     Sig: Take 17 g by mouth daily for 7 days PRN constipation     Dispense:  1 Bottle     Refill:  0    docusate sodium (COLACE) 100 MG capsule     Sig: Take 1 capsule by mouth 2 times daily for 14 days     Dispense:  28 capsule     Refill:  0    Sodium Phosphates (FLEET) 7-19 GM/118ML     Sig: Place 1 enema rectally once as needed (constipation)     Dispense:  1 Bottle     Refill:  0           Initial MDM/Plan: 48 y.o. male who presents with concern for constipation. Vital signs stable on arrival.  Abdomen soft, nondistended nontender. There are active bowel sounds on auscultation. G-tube is in place with no concerns for surrounding infection. Patient has no other prior surgeries on abdomen, low suspicion for obstruction. He is also had no nausea or vomiting. Will get KUB to assess for possible obstruction although low suspicion. We will give the patient MiraLAX and Colace for symptomatic relief. DIAGNOSTIC RESULTS / EMERGENCYDEPARTMENT COURSE / MDM     LABS:  Labs Reviewed - No data to display      RADIOLOGY:  Xr Abdomen (kub) (single Ap View)    Result Date: 5/13/2021  EXAMINATION: ONE SUPINE XRAY VIEW(S) OF THE ABDOMEN 5/13/2021 2:54 pm COMPARISON: February 7, 2021 HISTORY: ORDERING SYSTEM PROVIDED HISTORY: constipation TECHNOLOGIST PROVIDED HISTORY: constipation Reason for Exam: portable, supine Acuity: Unknown FINDINGS: Visualized bowel loops appear normal in caliber. No abnormal calcifications. No signs of hepatosplenomegaly. No acute osseous abnormality identified. Formed stool noted throughout much of the colon and rectum. Gastrostomy tube redemonstrated. Moderate stool burden may indicate constipation. EKG      All EKG's are interpreted by the Emergency Department Physicianwho either signs or Co-signs this chart in the absence of a cardiologist.    EMERGENCY DEPARTMENT COURSE:      KUB significant for mild stool burden. Patient was discharged with MiraLAX, Colace and Fleet enema. He was given strict return precautions for nausea/vomiting or abdominal pain. Patient expressed understanding. PROCEDURES:  None    CONSULTS:  None    CRITICAL CARE:  Please see attending note    FINAL IMPRESSION      1.  Constipation, unspecified constipation type          DISPOSITION / PLAN     DISPOSITION Decision To Discharge 05/13/2021 03:40:44 PM      PATIENT REFERRED TO:  Jas Felix, APRN - CNP  [address]  [phone]    Schedule an appointment as soon as possible for a visit       [facility] ED  [address] [place] [address]  [phone]  Go to   If symptoms worsen      DISCHARGE MEDICATIONS:  Discharge Medication List as of 5/13/2021  3:43 PM      START taking these medications    Details   polyethylene glycol (MIRALAX) 17 GM/SCOOP powder Take 17 g by mouth daily for 7 days PRN constipation, Disp-1 Bottle, R-0Print      docusate sodium (COLACE) 100 MG capsule Take 1 capsule by mouth 2 times daily for 14 days, Disp-28 capsule, R-0Print      Sodium Phosphates (FLEET) 7-19 GM/118ML Place 1 enema rectally once as needed (constipation), Disp-1 Bottle, R-0Print             Kahlil Duong MD  Emergency Medicine Resident    (Please note that portions of this note were completed with a voice recognition program.Efforts were made to edit the dictations but occasionally words are mis-transcribed.)        Kahlil Duong MD  Resident  05/13/21 0956

## 2021-05-13 NOTE — ED PROVIDER NOTES
[facility] ED     Emergency Department     Faculty Attestation        I performed a history and physical examination of the patient and discussed management with the resident. I reviewed the residents note and agree with the documented findings and plan of care. Any areas of disagreement are noted on the chart. I was personally present for the key portions of any procedures. I have documented in the chart those procedures where I was not present during the key portions. I have reviewed the emergency nurses triage note. I agree with the chief complaint, past medical history, past surgical history, allergies, medications, social and family history as documented unless otherwise noted below. For Physician Assistant/ Nurse Practitioner cases/documentation I have personally evaluated this patient and have completed at least one if not all key elements of the E/M (history, physical exam, and MDM). Additional findings are as noted. Vital Signs: BP: 127/81  Pulse: 92  Resp: 18  Temp: 97.4 °F (36.3 °C) SpO2: 100 %  PCP:  QIAN Hernández - CNP    Pertinent Comments:     Patient is a 45-year-old male with history of oral cancer 13 years ago that resulted in him having a G-tube chronically from that time forward. Patient states over the last 6 days has had constipation with difficulty with bowel movements but no abdominal pain associated or vomiting/nausea. Denies any fevers or chills and abdomen is soft/nontender/nondistended   Bowel sounds are present but slightly hypoactive. Assessment/plan: Appears to be constipation but given G-tube in place and previous issues will obtain screening KUB to assure no other acute process.    Likely more aggressive treatment for constipation and will likely offer assessment for possible disimpaction at the bedside    Critical Care  None    This patient was evaluated in the Emergency Department for symptoms described in the history of present illness. He/she was evaluated in the context of the global COVID-19 pandemic, which necessitated consideration that the patient might be at risk for infection with the SARS-CoV-2 virus that causes COVID-19. Institutional protocols and algorithms that pertain to the evaluation of patients at risk for COVID-19 are in a state of rapid change based on information released by regulatory bodies including the CDC and federal and state organizations. These policies and algorithms were followed during the patient's care in the ED. (Please note that portions of this note were completed with a voice recognition program. Efforts were made to edit the dictations but occasionally words are mis-transcribed.  Whenever words are used in this note in any gender, they shall be construed as though they were used in the gender appropriate to the circumstances; and whenever words are used in this note in the singular or plural form, they shall be construed as though they were used in the form appropriate to the circumstances.)    MD Quynh Gold  Attending Emergency Medicine Physician             Randy Pathak MD  05/13/21 [address] MD  05/13/21 6737

## 2021-05-14 ENCOUNTER — HOSPITAL ENCOUNTER (EMERGENCY)
Age: 51
Discharge: HOME | End: 2021-05-15
Attending: EMERGENCY MEDICINE
Payer: COMMERCIAL

## 2021-05-14 VITALS
SYSTOLIC BLOOD PRESSURE: 147 MMHG | HEIGHT: 69 IN | DIASTOLIC BLOOD PRESSURE: 89 MMHG | TEMPERATURE: 97.3 F | HEART RATE: 108 BPM | WEIGHT: 140 LBS | OXYGEN SATURATION: 98 % | RESPIRATION RATE: 20 BRPM | BODY MASS INDEX: 20.73 KG/M2

## 2021-05-14 DIAGNOSIS — K59.00 CONSTIPATION, UNSPECIFIED CONSTIPATION TYPE: Primary | ICD-10-CM

## 2021-05-14 PROCEDURE — 99282 EMERGENCY DEPT VISIT SF MDM: CPT

## 2021-05-14 RX ORDER — POLYETHYLENE GLYCOL 3350 17 G/17G
17 POWDER, FOR SOLUTION ORAL 2 TIMES DAILY
Qty: 1020 G | Refills: 0 | Status: SHIPPED | OUTPATIENT
Start: 2021-05-14 | End: 2021-06-13

## 2021-05-14 RX ORDER — SENNOSIDES 8.8 MG/5ML
5 LIQUID ORAL 2 TIMES DAILY
Qty: 300 ML | Refills: 0 | Status: SHIPPED | OUTPATIENT
Start: 2021-05-14 | End: 2021-06-13

## 2021-05-14 ASSESSMENT — ENCOUNTER SYMPTOMS
SHORTNESS OF BREATH: 0
COUGH: 0
CONSTIPATION: 1
ABDOMINAL PAIN: 1
VOMITING: 0
NAUSEA: 0
RHINORRHEA: 0

## 2021-05-15 NOTE — ED NOTES
Writer checked on pt while in bathroom and pt stated \"it's coming out\"     Royal Roa, [facility]  05/14/21 0900

## 2021-05-15 NOTE — ED PROVIDER NOTES
Medical: Not on file     Non-medical: Not on file   Tobacco Use    Smoking status: Former Smoker     Packs/day: 0.25     Years: 15.00     Pack years: 3.75     Quit date: 2005     Years since quittin.3    Smokeless tobacco: Never Used   Substance and Sexual Activity    Alcohol use: Yes     Comment: weekly    Drug use: Yes     Types: Marijuana     Comment: Past hx    Sexual activity: Yes     Partners: Female   Lifestyle    Physical activity     Days per week: Not on file     Minutes per session: Not on file    Stress: Not on file   Relationships    Social connections     Talks on phone: Not on file     Gets together: Not on file     Attends Temple service: Not on file     Active member of club or organization: Not on file     Attends meetings of clubs or organizations: Not on file     Relationship status: Not on file    Intimate partner violence     Fear of current or ex partner: Not on file     Emotionally abused: Not on file     Physically abused: Not on file     Forced sexual activity: Not on file   Other Topics Concern    Not on file   Social History Narrative    Not on file       Family History   Problem Relation Age of Onset    High Blood Pressure Mother     Heart Disease Father        Allergies:    No known allergies    Home Medications:  Prior to Admission medications    Medication Sig Start Date End Date Taking?  Authorizing Provider   senna (SENOKOT) 8.8 MG/5ML SYRP syrup Take 5 mLs by mouth 2 times daily 21 Yes Mayte Peacock MD   polyethylene glycol [facility]) 17 GM/SCOOP powder Take 17 g by mouth 2 times daily PRN constipation 21 Yes Mayte Peacock MD   magnesium hydroxide (MILK OF MAGNESIA) 400 MG/5ML suspension Take 30 mLs by mouth daily as needed for Constipation 21 Yes Mayte Peacock MD   acetaminophen (TYLENOL) 325 MG tablet Take 1 tablet by mouth every 6 hours as needed for Pain 20   Oscar Sadler MD   aspirin EC 81 person, place, and time. DIFFERENTIAL  DIAGNOSIS   PLAN (LABS / IMAGING / EKG):  Orders Placed This Encounter   Procedures    Misc nursing order (specify)       MEDICATIONS ORDERED:  Orders Placed This Encounter   Medications    DISCONTD: magnesium hydroxide (MILK OF MAGNESIA) 400 MG/5ML suspension 30 mL    senna (SENOKOT) 8.8 MG/5ML SYRP syrup     Sig: Take 5 mLs by mouth 2 times daily     Dispense:  300 mL     Refill:  0    polyethylene glycol (MIRALAX) 17 GM/SCOOP powder     Sig: Take 17 g by mouth 2 times daily PRN constipation     Dispense:  1020 g     Refill:  0    magnesium hydroxide (MILK OF MAGNESIA) 400 MG/5ML suspension     Sig: Take 30 mLs by mouth daily as needed for Constipation     Dispense:  1 Bottle     Refill:  0         DIAGNOSTIC RESULTS / EMERGENCYDEPARTMENT COURSE / MDM   LABS:  Labs Reviewed - No data to display    RADIOLOGY:  No results found. Impression:  Patient constipated. Ongoing for 6 days. States that he tried magnesium citrate at home on day 1 but has not tried it since. Was seen here yesterday for similar. X-ray at the time demonstrated moderate stool burden. Patient was given MiraLAX and Colace. Has only taken the MiraLAX twice. Has not taken the Colace since he states he cannot take pills. States he tried a Fleet enema at home as well which she states gave him some relief. Will give a milk molasses enema and a dose of magnesium hydroxide at discharge. Will change patient's stool softener to a liquid form. Patient is also not been consuming enough water for the MiraLAX to take effect. Patient states he is only had water with the MiraLAX and not throughout the day. EMERGENCY DEPARTMENT COURSE:   Patient was given milk of molasses enema. Did have small bowel movement. Patient was then discharged however before he left he began complaining of worsening rectal pain.   Patient tried sitting on the toilet for approximately 20 minutes but did not have a 5/14/2021 10:28 PM      START taking these medications    Details   senna (SENOKOT) 8.8 MG/5ML SYRP syrup Take 5 mLs by mouth 2 times daily, Disp-300 mL, R-0Print      magnesium hydroxide (MILK OF MAGNESIA) 400 MG/5ML suspension Take 30 mLs by mouth daily as needed for Constipation, Disp-1 Bottle, R-0Print             Pedro Zaidi DO  Emergency Medicine Resident Physician, PGY-3    (Please note that portions of this note were completed with a voice recognition program.  Efforts were made to edit the dictations but occasionally words are mis-transcribed.)         Pedro aZidi MD  05/15/21 3160

## 2021-05-15 NOTE — ED NOTES
Pt stated he still feel like he had stool sticking out of him.  Dr Felice Weeks notified     Mikey Angelucci, RN  05/14/21 4506

## 2021-05-15 NOTE — ED PROVIDER NOTES
enema, continue MiraLAX and fiber, increase oral water intake. Sergo Marmolejo.  Jer Ochoa MD, [facility]  Attending Emergency  Physician               Darlene Lima MD  05/14/21 0633

## 2021-11-21 ENCOUNTER — HOSPITAL ENCOUNTER (EMERGENCY)
Age: 51
Discharge: HOME | End: 2021-11-21
Attending: EMERGENCY MEDICINE
Payer: COMMERCIAL

## 2021-11-21 ENCOUNTER — APPOINTMENT (OUTPATIENT)
Dept: GENERAL RADIOLOGY | Age: 51
End: 2021-11-21
Payer: COMMERCIAL

## 2021-11-21 VITALS
WEIGHT: 150 LBS | OXYGEN SATURATION: 99 % | SYSTOLIC BLOOD PRESSURE: 105 MMHG | HEIGHT: 68 IN | TEMPERATURE: 97 F | BODY MASS INDEX: 22.73 KG/M2 | DIASTOLIC BLOOD PRESSURE: 71 MMHG | RESPIRATION RATE: 20 BRPM | HEART RATE: 74 BPM

## 2021-11-21 DIAGNOSIS — Z43.1 ATTENTION TO G-TUBE (HCC): Primary | ICD-10-CM

## 2021-11-21 PROCEDURE — 74018 RADEX ABDOMEN 1 VIEW: CPT

## 2021-11-21 PROCEDURE — 99284 EMERGENCY DEPT VISIT MOD MDM: CPT

## 2021-11-21 PROCEDURE — 43762 RPLC GTUBE NO REVJ TRC: CPT

## 2021-11-21 PROCEDURE — 6360000004 HC RX CONTRAST MEDICATION: Performed by: EMERGENCY MEDICINE

## 2021-11-21 RX ADMIN — DIATRIZOATE MEGLUMINE AND DIATRIZOATE SODIUM 35 ML: 660; 100 LIQUID ORAL; RECTAL at 21:17

## 2021-11-22 ASSESSMENT — ENCOUNTER SYMPTOMS
DIARRHEA: 0
NAUSEA: 0
ABDOMINAL PAIN: 0
VOMITING: 0
ABDOMINAL DISTENTION: 0

## 2021-11-22 NOTE — ED PROVIDER NOTES
[facility]     Emergency Department     Faculty Attestation    I performed a history and physical examination of the patient and discussed management with the resident. I reviewed the residents note and agree with the documented findings and plan of care. Any areas of disagreement are noted on the chart. I was personally present for the key portions of any procedures. I have documented in the chart those procedures where I was not present during the key portions. I have reviewed the emergency nurses triage note. I agree with the chief complaint, past medical history, past surgical history, allergies, medications, social and family history as documented unless otherwise noted below. For Physician Assistant/ Nurse Practitioner cases/documentation I have personally evaluated this patient and have completed at least one if not all key elements of the E/M (history, physical exam, and MDM). Additional findings are as noted. I have personally seen and evaluated the patient. I find the patient's history and physical exam are consistent with the NP/PA documentation. I agree with the care provided, treatment rendered, disposition and follow-up plan. 66-year-old male, G-tube dependent s/p oral cancer presenting with a hole in his G-tube. He states that he has had holes in the tube previously and was able to take them closed, but this time it will not stay taped. He denies any abdominal pain. He has not had it replaced since February, when he had it last replaced in the ER after an accidental dislodgment. He does not know his PCP, and does not know who manages his G-tube. Exam:  General: Laying on the bed, awake, alert and in no acute distress  CV: normal rate and regular rhythm  Lungs: Breathing comfortably on room air with no tachypnea, hypoxia, or increased work of breathing  Abdomen: G-tube in place in the left upper quadrant with large hole in the tubing.   Tubing is yellow and friable. No discharge surrounding the G-tube insertion site in the abdomen. No bleeding. Plan:  G-tube replaced with same size tube. Initially had difficulty removing the G-tube, as the balloon would not deflate via the inflation port. Balloon was visible in the stoma, and a small puncture was made with a blunt tipped needle into the balloon to facilitate removal.  Replacement G-tube went in without difficulty, was filled with 8 mL of sterile water. Gastrografin x-ray confirmed placement, patient discharged home. We will ask social work to set up PCP appointment, as patient should have regular follow-up and exchange of these tubes.         Sisto Duane, MD   Attending Emergency  Physician    (Please note that portions of this note were completed with a voice recognition program. Efforts were made to edit the dictations but occasionally words are mis-transcribed.)             Sisto Duane, MD  11/21/21 7010

## 2021-11-22 NOTE — ED PROVIDER NOTES
Not on file   Food Insecurity:     Worried About [facility] in the Last Year: Not on file    Kaly[place] of Food in the Last Year: Not on file   Transportation Needs:     Lack of Transportation (Medical): Not on file    Lack of Transportation (Non-Medical): Not on file   Physical Activity:     Days of Exercise per Week: Not on file    Minutes of Exercise per Session: Not on file   Stress:     Feeling of Stress : Not on file   Social Connections:     Frequency of Communication with Friends and Family: Not on file    Frequency of Social Gatherings with Friends and Family: Not on file    Attends Mandaeism Services: Not on file    Active Member of [address] [place] [address] or Organizations: Not on file    Attends Club or Organization Meetings: Not on file    Marital Status: Not on file   Intimate Partner Violence:     Fear of Current or Ex-Partner: Not on file    Emotionally Abused: Not on file    Physically Abused: Not on file    Sexually Abused: Not on file   Housing Stability:     Unable to Pay for Housing in the Last Year: Not on file    Number of [place] in the Last Year: Not on file    Unstable Housing in the Last Year: Not on file       Family History   Problem Relation Age of Onset    High Blood Pressure Mother     Heart Disease Father        Allergies:  No known allergies    Home Medications:  Prior to Admission medications    Medication Sig Start Date End Date Taking? Authorizing Provider   senna (SENOKOT) 8.8 MG/5ML SYRP syrup Take 5 mLs by mouth 2 times daily 5/14/21 6/13/21  Leia Rivero MD   acetaminophen (TYLENOL) 325 MG tablet Take 1 tablet by mouth every 6 hours as needed for Pain 7/13/20   Dougie Avila MD   aspirin EC 81 MG EC tablet Take 2 tablets by mouth daily While having COVID symptoms 7/13/20   Dougie Avila MD       REVIEW OF SYSTEMS    (2-9 systems for level 4, 10 or more for level 5)      Review of Systems   Constitutional: Negative for fever.    Cardiovascular: Negative cc of fluid was filled into the balloon. Imaging was obtained that demonstrated that the G-tube is in place, 35 cc of Gastrografin was introduced into the G-tube. CONSULTS:  None    MEDICAL DECISION MAKING:  Patient had G-tube replaced, no complications, imaging demonstrated in place. Patient was discharged home in stable condition. CRITICAL CARE:  Please see attending note    FINAL IMPRESSION      1.  Attention to G-tube [facility])          DISPOSITION / PLAN     DISPOSITION Decision To Discharge 11/21/2021 10:18:25 PM      PATIENT REFERRED TO:  Laura Chiu, APRN - CNP  [address]  [phone]    Schedule an appointment as soon as possible for a visit         DISCHARGE MEDICATIONS:  Discharge Medication List as of 11/21/2021 10:19 PM          Amada Orellana, [facility], DO  Emergency Medicine Resident    (Please note that portions of thisnote were completed with a voice recognition program.  Efforts were made to edit the dictations but occasionally words are mis-transcribed.)       Liya Grullon DO  Resident  11/22/21 2231

## 2021-11-22 NOTE — ED NOTES
Per Dr. Vickie Padilla patient has not changed G-Tube in 6 months. She is concerned about management at home. Patient verified her receives G-Tube supplies once per month and shipment is due next week. LSW offered to set up home care to assist in management and have RN to check in. Patient declined. Patient verified that QIAN Guerrero is current. LSW sent message via fax to her office requesting they follow up with patient to schedule follow up appt from ED visit. Dr. Vickie Padilla notified.      David [address]MARITA  11/21/21 0709

## 2022-05-19 ENCOUNTER — APPOINTMENT (OUTPATIENT)
Dept: GENERAL RADIOLOGY | Age: 52
End: 2022-05-19
Payer: COMMERCIAL

## 2022-05-19 ENCOUNTER — HOSPITAL ENCOUNTER (EMERGENCY)
Age: 52
Discharge: HOME | End: 2022-05-19
Attending: EMERGENCY MEDICINE
Payer: COMMERCIAL

## 2022-05-19 VITALS
WEIGHT: 145 LBS | OXYGEN SATURATION: 100 % | HEART RATE: 88 BPM | HEIGHT: 69 IN | SYSTOLIC BLOOD PRESSURE: 105 MMHG | RESPIRATION RATE: 16 BRPM | BODY MASS INDEX: 21.48 KG/M2 | DIASTOLIC BLOOD PRESSURE: 61 MMHG | TEMPERATURE: 96.8 F

## 2022-05-19 DIAGNOSIS — Z93.1 GASTROINTESTINAL TUBE PRESENT (HCC): Primary | ICD-10-CM

## 2022-05-19 PROCEDURE — 99283 EMERGENCY DEPT VISIT LOW MDM: CPT

## 2022-05-19 PROCEDURE — 6360000004 HC RX CONTRAST MEDICATION: Performed by: STUDENT IN AN ORGANIZED HEALTH CARE EDUCATION/TRAINING PROGRAM

## 2022-05-19 PROCEDURE — 49465 FLUORO EXAM OF G/COLON TUBE: CPT

## 2022-05-19 PROCEDURE — 43762 RPLC GTUBE NO REVJ TRC: CPT

## 2022-05-19 RX ADMIN — DIATRIZOATE MEGLUMINE AND DIATRIZOATE SODIUM 30 ML: 660; 100 LIQUID ORAL; RECTAL at 13:30

## 2022-05-19 ASSESSMENT — ENCOUNTER SYMPTOMS
ABDOMINAL PAIN: 0
SHORTNESS OF BREATH: 0
NAUSEA: 0
COUGH: 0
VOMITING: 0

## 2022-05-19 NOTE — ED NOTES
Pt denies abdominal pain  Pt denies fever  Pt denies chills  Pt states feeding placed 13 years ago  Pt states he noticed a hole in tube while giving feeding last evening       Robert Guardado, JAKE  05/86/06 9215

## 2022-05-19 NOTE — ED PROVIDER NOTES
[facility] [facility]     Emergency Department     Faculty Attestation        I performed a history and physical examination of the patient and discussed management with the resident. I reviewed the residents note and agree with the documented findings and plan of care. Any areas of disagreement are noted on the chart. I was personally present for the key portions of any procedures. I have documented in the chart those procedures where I was not present during the key portions. I have reviewed the emergency nurses triage note. I agree with the chief complaint, past medical history, past surgical history, allergies, medications, social and family history as documented unless otherwise noted below. For Physician Assistant/ Nurse Practitioner cases/documentation I have personally evaluated this patient and have completed at least one if not all key elements of the E/M (history, physical exam, and MDM). Additional findings are as noted. Vital Signs: BP: 105/61  Pulse: 88  Resp: 16  Temp: 96.8 °F (36 °C) SpO2: 100 %  PCP:  QIAN Bella CNP    Pertinent Comments:         Critical Care  None    This patient was evaluated in the Emergency Department for symptoms described in the history of present illness. He/she was evaluated in the context of the global COVID-19 pandemic, which necessitated consideration that the patient might be at risk for infection with the SARS-CoV-2 virus that causes COVID-19. Institutional protocols and algorithms that pertain to the evaluation of patients at risk for COVID-19 are in a state of rapid change based on information released by regulatory bodies including the CDC and federal and state organizations. These policies and algorithms were followed during the patient's care in the ED.     (Please note that portions of this note were completed with a voice recognition program. Efforts were made to edit the dictations but

## 2022-05-19 NOTE — ED PROVIDER NOTES
[facility] ED  Emergency Department Encounter  EmergencyMedicine Resident     Pt Annabel Wadsworth  MRN: [id number]  [place] 1970  Date of evaluation: 22  PCP:  QIAN Ayala CNP    CHIEF COMPLAINT       Chief Complaint   Patient presents with    Other     feeding tube malfunction       HISTORY OF PRESENT ILLNESS  (Location/Symptom, Timing/Onset, Context/Setting, Quality, Duration, Modifying Factors, Severity.)      Yasmin Haji is a 46 y.o. male who presents with leaking G-tube. 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 Patient is not currently on chemotherapy and is in remission. He is complaining of the G-tube leaking, he states that there is a small pin hole size lesion on the outer portion of the tube. He was here several months ago with a similar complaint. Patient states his G-tube was last exchanged 6 months ago. Denies any abdominal pain fevers, chills. PAST MEDICAL / SURGICAL / SOCIAL / FAMILY HISTORY      has a past medical history of Cancer [facility]), Colon cancer screening, Colon polyps, and Uses feeding tube. has a past surgical history that includes Mandible reconstruction and Colonoscopy (2018).     Social History     Socioeconomic History    Marital status: Single     Spouse name: Not on file    Number of children: Not on file    Years of education: Not on file    Highest education level: Not on file   Occupational History    Not on file   Tobacco Use    Smoking status: Former Smoker     Packs/day: 0.25     Years: 15.00     Pack years: 3.75     Quit date: 2005     Years since quittin.3    Smokeless tobacco: Never Used   Vaping Use    Vaping Use: Never used   Substance and Sexual Activity    Alcohol use: Yes     Comment: weekly    Drug use: Yes     Types: Marijuana Cromwell Олег)     Comment: Past hx    Sexual activity: Yes     Partners: Female   Other Topics Concern    Not on file Social History Narrative    Not on file     Social Determinants of Health     Financial Resource Strain:     Difficulty of Paying Living Expenses: Not on file   Food Insecurity:     Worried About Running Out of Food in the Last Year: Not on file    Kalyan of Food in the Last Year: Not on file   Transportation Needs:     Lack of Transportation (Medical): Not on file    Lack of Transportation (Non-Medical): Not on file   Physical Activity:     Days of Exercise per Week: Not on file    Minutes of Exercise per Session: Not on file   Stress:     Feeling of Stress : Not on file   Social Connections:     Frequency of Communication with Friends and Family: Not on file    Frequency of Social Gatherings with Friends and Family: Not on file    Attends Mosque Services: Not on file    Active Member of [address] [place] [address] [employer] or Organizations: Not on file    Attends Club or Organization Meetings: Not on file    Marital Status: Not on file   Intimate Partner Violence:     Fear of Current or Ex-Partner: Not on file    Emotionally Abused: Not on file    Physically Abused: Not on file    Sexually Abused: Not on file   Housing Stability:     Unable to Pay for Housing in the Last Year: Not on file    Number of [place] in the Last Year: Not on file    Unstable Housing in the Last Year: Not on file       Family History   Problem Relation Age of Onset    High Blood Pressure Mother     Heart Disease Father        Allergies:  No known allergies    Home Medications:  Prior to Admission medications    Medication Sig Start Date End Date Taking?  Authorizing Provider   senna (SENOKOT) 8.8 MG/5ML SYRP syrup Take 5 mLs by mouth 2 times daily 5/14/21 6/13/21  Tamera Jimenez DO   acetaminophen (TYLENOL) 325 MG tablet Take 1 tablet by mouth every 6 hours as needed for Pain 7/13/20   Christ Hill MD   aspirin EC 81 MG EC tablet Take 2 tablets by mouth daily While having COVID symptoms 7/13/20   Christ Hill MD       REVIEW OF SYSTEMS    (2-9 systems for level 4, 10 or more for level 5)      Review of Systems   Constitutional: Negative for chills and fever. Respiratory: Negative for cough and shortness of breath. Cardiovascular: Negative for chest pain. Gastrointestinal: Negative for abdominal pain, nausea and vomiting. Skin: Negative for rash. PHYSICAL EXAM   (up to 7 for level 4, 8 or more for level 5)      INITIAL VITALS:   /61   Pulse 88   Temp 96.8 °F (36 °C) (Oral)   Resp 16   Ht 5' 9\" (1.753 m)   Wt 145 lb (65.8 kg)   SpO2 100%   BMI 21.41 kg/m²      Vitals:    05/19/22 1117   BP: 105/61   Pulse: 88   Resp: 16   Temp: 96.8 °F (36 °C)   TempSrc: Oral   SpO2: 100%   Weight: 145 lb (65.8 kg)   Height: 5' 9\" (1.753 m)        Physical Exam  Vitals reviewed. Constitutional:       General: He is not in acute distress. Appearance: He is well-developed. He is not ill-appearing. HENT:      Head: Normocephalic and atraumatic. Eyes:      Extraocular Movements: Extraocular movements intact. Pupils: Pupils are equal, round, and reactive to light. Cardiovascular:      Rate and Rhythm: Normal rate and regular rhythm. Pulmonary:      Effort: Pulmonary effort is normal. No respiratory distress. Abdominal:      General: There is no distension. Palpations: Abdomen is soft. Tenderness: There is no abdominal tenderness. There is no guarding. Comments: 25 Estonian G-tube in place, small pinhole in the tube leaking gastric contents. No surrounding erythema warmth or fluctuance. Abdomen soft nontender. Musculoskeletal:         General: Normal range of motion. Cervical back: Normal range of motion and neck supple. Right lower leg: No edema. Left lower leg: No edema. Skin:     General: Skin is warm and dry. Neurological:      General: No focal deficit present. Mental Status: He is alert and oriented to person, place, and time.          DIFFERENTIAL  DIAGNOSIS     PLAN (LABS / IMAGING / EKG):  Orders Placed This Encounter   Procedures    XR INJ CONTRAST GASTRIC TUBE PERC       MEDICATIONS ORDERED:  Orders Placed This Encounter   Medications    DISCONTD: diatrizoate meglumine-sodium (GASTROGRAFIN) 66-10 % solution 30 mL    DISCONTD: diatrizoate meglumine-sodium (GASTROGRAFIN) 66-10 % solution 30 mL       DIAGNOSTIC RESULTS / EMERGENCY DEPARTMENT COURSE / MDM   LAB RESULTS:  No results found for this visit on 05/19/22. RADIOLOGY:  XR INJ CONTRAST GASTRIC TUBE PERC   Final Result   Percutaneous gastrostomy tube is confirmed to be intraluminal within the   stomach with the enteric contrast injection. No extraluminal contrast   extravasation. EMERGENCY DEPARTMENT COURSE & MDM:    This is a pleasant 66-year-old gentleman coming in for G-tube replacement. I have no concerns for infection. Abdomen is benign. Well-established tract. An 25 French G-tube was replaced, bumper set to 5 cm. G-tube replaced with same size and distance as previous. Gastrografin study shows good placement. Discharged to follow-up with PCP/GI as needed. ED Course as of 05/21/22 0401   Thu May 19, 2022   1403 5cm [CS]   1414 XR INJ CONTRAST GASTRIC TUBE PERC  IMPRESSION:  Percutaneous gastrostomy tube is confirmed to be intraluminal within the  stomach with the enteric contrast injection. No extraluminal contrast  extravasation. [CS]      ED Course User Index  [CS] Irvin Zuleta DO         PROCEDURES:      CONSULTS:  None    CRITICAL CARE:  Please see attending note    FINAL IMPRESSION      1.  Gastrointestinal tube present [facility])          DISPOSITION / PLAN     DISPOSITION Decision To Discharge 05/19/2022 02:15:11 PM      PATIENT REFERRED TO:  Audria Marco Antonio, APRN - CNP  [address]  [phone]            DISCHARGE MEDICATIONS:  Discharge Medication List as of 5/19/2022  2:16 PM          [facility]  Emergency Medicine Resident    (Please note that portions of thisnote were completed with a voice recognition program.  Efforts were made to edit the dictations but occasionally words are mis-transcribed.)        Dylan Welsh DO  Resident  05/21/22 9308

## 2022-09-17 ENCOUNTER — HOSPITAL ENCOUNTER (EMERGENCY)
Age: 52
Discharge: HOME | End: 2022-09-17
Attending: EMERGENCY MEDICINE
Payer: COMMERCIAL

## 2022-09-17 VITALS
HEART RATE: 86 BPM | WEIGHT: 98 LBS | HEIGHT: 69 IN | DIASTOLIC BLOOD PRESSURE: 70 MMHG | OXYGEN SATURATION: 98 % | SYSTOLIC BLOOD PRESSURE: 103 MMHG | TEMPERATURE: 97.8 F | BODY MASS INDEX: 14.51 KG/M2 | RESPIRATION RATE: 18 BRPM

## 2022-09-17 DIAGNOSIS — S16.1XXA NECK STRAIN, INITIAL ENCOUNTER: Primary | ICD-10-CM

## 2022-09-17 DIAGNOSIS — S39.012A BACK STRAIN, INITIAL ENCOUNTER: ICD-10-CM

## 2022-09-17 PROCEDURE — 99283 EMERGENCY DEPT VISIT LOW MDM: CPT

## 2022-09-17 RX ORDER — IBUPROFEN 400 MG/1
600 TABLET ORAL EVERY 6 HOURS PRN
Status: DISCONTINUED | OUTPATIENT
Start: 2022-09-17 | End: 2022-09-17 | Stop reason: HOSPADM

## 2022-09-17 RX ORDER — CYCLOBENZAPRINE HCL 5 MG
5 TABLET ORAL 3 TIMES DAILY PRN
Qty: 10 TABLET | Refills: 0 | Status: SHIPPED | OUTPATIENT
Start: 2022-09-17 | End: 2022-09-27

## 2022-09-17 RX ORDER — CYCLOBENZAPRINE HCL 10 MG
5 TABLET ORAL 3 TIMES DAILY
Status: DISCONTINUED | OUTPATIENT
Start: 2022-09-17 | End: 2022-09-17 | Stop reason: HOSPADM

## 2022-09-17 RX ORDER — IBUPROFEN 600 MG/1
600 TABLET ORAL EVERY 6 HOURS PRN
Qty: 30 TABLET | Refills: 0 | Status: SHIPPED | OUTPATIENT
Start: 2022-09-17

## 2022-09-17 ASSESSMENT — ENCOUNTER SYMPTOMS
EYE DISCHARGE: 0
VOMITING: 0
ABDOMINAL PAIN: 0
CHEST TIGHTNESS: 0
SHORTNESS OF BREATH: 0
BACK PAIN: 1
NAUSEA: 0
EYE PAIN: 0

## 2022-09-17 ASSESSMENT — PAIN DESCRIPTION - ORIENTATION: ORIENTATION: MID

## 2022-09-17 ASSESSMENT — PAIN SCALES - GENERAL: PAINLEVEL_OUTOF10: 6

## 2022-09-17 ASSESSMENT — PAIN - FUNCTIONAL ASSESSMENT: PAIN_FUNCTIONAL_ASSESSMENT: 0-10

## 2022-09-17 ASSESSMENT — PAIN DESCRIPTION - LOCATION: LOCATION: BACK
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
## 2022-09-17 NOTE — DISCHARGE INSTRUCTIONS
Take Flexeril 5 mg every 8 hours as needed    Take motrin 600 mg every 6 hours for the pain as needed    Follow up with Primary care physician if the symptoms worsen
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
## 2023-01-09 ENCOUNTER — HOSPITAL ENCOUNTER (EMERGENCY)
Age: 53
Discharge: HOME | End: 2023-01-09
Attending: EMERGENCY MEDICINE
Payer: COMMERCIAL

## 2023-01-09 ENCOUNTER — APPOINTMENT (OUTPATIENT)
Dept: GENERAL RADIOLOGY | Age: 53
End: 2023-01-09
Payer: COMMERCIAL

## 2023-01-09 VITALS
HEIGHT: 68 IN | OXYGEN SATURATION: 100 % | HEART RATE: 81 BPM | WEIGHT: 140 LBS | TEMPERATURE: 96.9 F | BODY MASS INDEX: 21.22 KG/M2 | SYSTOLIC BLOOD PRESSURE: 125 MMHG | RESPIRATION RATE: 16 BRPM | DIASTOLIC BLOOD PRESSURE: 87 MMHG

## 2023-01-09 DIAGNOSIS — Z43.1 ATTENTION TO G-TUBE (HCC): Primary | ICD-10-CM

## 2023-01-09 PROCEDURE — 43762 RPLC GTUBE NO REVJ TRC: CPT

## 2023-01-09 PROCEDURE — 6360000004 HC RX CONTRAST MEDICATION: Performed by: STUDENT IN AN ORGANIZED HEALTH CARE EDUCATION/TRAINING PROGRAM

## 2023-01-09 PROCEDURE — 49465 FLUORO EXAM OF G/COLON TUBE: CPT

## 2023-01-09 PROCEDURE — 99283 EMERGENCY DEPT VISIT LOW MDM: CPT

## 2023-01-09 RX ADMIN — DIATRIZOATE MEGLUMINE AND DIATRIZOATE SODIUM 30 ML: 660; 100 LIQUID ORAL; RECTAL at 13:02

## 2023-01-09 ASSESSMENT — ENCOUNTER SYMPTOMS
VOMITING: 0
NAUSEA: 0
SHORTNESS OF BREATH: 0
DIARRHEA: 0
BACK PAIN: 0
CONSTIPATION: 0
ABDOMINAL PAIN: 0

## 2023-01-09 NOTE — DISCHARGE INSTRUCTIONS
You were evaluated in the emergency department for a broken G-tube. Your G-tube was removed and a new one was inserted. A Xray with contrast showed that the new G tube is in a good place. Please follow up with your primary care physician this week. Please return to the emergency department for any worsening symptoms including but not limited to chest pain, shortness of breath, broken G-tube, inability to flush or drain G-tube, or any other questions or concerns.

## 2023-01-09 NOTE — ED PROVIDER NOTES
[facility]     Emergency Department     Faculty Attestation    I performed a history and physical examination of the patient and discussed management with the resident. I reviewed the residents note and agree with the documented findings and plan of care. Any areas of disagreement are noted on the chart. I was personally present for the key portions of any procedures. I have documented in the chart those procedures where I was not present during the key portions. I have reviewed the emergency nurses triage note. I agree with the chief complaint, past medical history, past surgical history, allergies, medications, social and family history as documented unless otherwise noted below. For Physician Assistant/ Nurse Practitioner cases/documentation I have personally evaluated this patient and have completed at least one if not all key elements of the E/M (history, physical exam, and MDM). Additional findings are as noted. Patient presents with leakage from his G-tube. He says he has had a G-tube since 2008. He says that the tube seems to be functioning fine other than there being a hole in the tube causing it to leak. He has no other complaints. The resident was able to replace the G-tube without difficulty. We will get a confirmation x-ray.       Felicity Motta MD  Attending Emergency  Physician            Bharati Coates MD  01/09/23 1518

## 2023-01-09 NOTE — ED NOTES
Dr. Antolin Crystal at bedside      Genesis Godinez, [address] [place] [address]  01/09/23 1219

## 2023-01-09 NOTE — ED PROVIDER NOTES
[facility]  ED  Emergency Department Encounter  EmergencyMedicine Resident     Pt Sergey Petre  MRN: [id number]  [place] 1970  Date of evaluation: 23  PCP:  QIAN Syed CNP    This patient was evaluated in the Emergency Department for symptoms described in the history of present illness. CHIEF COMPLAINT       Chief Complaint   Patient presents with    Feeding Tube Problem       HISTORY OF PRESENT ILLNESS  (Location/Symptom, Timing/Onset, Context/Setting, Quality, Duration, Modifying Factors, Severity.)      Celso Fontanez is a 46 y.o. 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 Patient states that his G-tube has been leaking out of the side of the catheter for the past 2 to 3 days. States that it still flushes and he is still able to have food but it leaks out. Patient has had a G-tube since . Patient's current G-tube is an 25 [place] with a 7 to 10 mL balloon. Denies any other complaints. No abdominal pain. No fever/chills. No chest pain or shortness of breath. PAST MEDICAL / SURGICAL / SOCIAL / FAMILY HISTORY      has a past medical history of Cancer [facility]), Colon cancer screening, Colon polyps, and Uses feeding tube. has a past surgical history that includes Mandible reconstruction and Colonoscopy (2018).     Social History     Socioeconomic History    Marital status: Single     Spouse name: Not on file    Number of children: Not on file    Years of education: Not on file    Highest education level: Not on file   Occupational History    Not on file   Tobacco Use    Smoking status: Former     Packs/day: 0.25     Years: 15.00     Pack years: 3.75     Types: Cigarettes     Quit date: 2005     Years since quittin.0    Smokeless tobacco: Never   Vaping Use    Vaping Use: Never used   Substance and Sexual Activity    Alcohol use: Yes     Comment: weekly    Drug use: Yes     Types: Marijuana Dietra Due)     Comment: Past hx    Sexual activity: Yes     Partners: Female   Other Topics Concern    Not on file   Social History Narrative    Not on file     Social Determinants of Health     Financial Resource Strain: Not on file   Food Insecurity: Not on file   Transportation Needs: Not on file   Physical Activity: Not on file   Stress: Not on file   Social Connections: Not on file   Intimate Partner Violence: Not on file   Housing Stability: Not on file       Family History   Problem Relation Age of Onset    High Blood Pressure Mother     Heart Disease Father        Allergies:    No known allergies    Home Medications:  Prior to Admission medications    Medication Sig Start Date End Date Taking? Authorizing Provider   ibuprofen (ADVIL;MOTRIN) 600 MG tablet Take 1 tablet by mouth every 6 hours as needed for Pain 9/17/22   Constanza Soto MD   senna (SENOKOT) 8.8 MG/5ML SYRP syrup Take 5 mLs by mouth 2 times daily 5/14/21 6/13/21  Faybienvenido Norton,    acetaminophen (TYLENOL) 325 MG tablet Take 1 tablet by mouth every 6 hours as needed for Pain 7/13/20   Yamilet Gee MD   aspirin EC 81 MG EC tablet Take 2 tablets by mouth daily While having COVID symptoms 7/13/20   Yamilet Gee MD       REVIEW OF SYSTEMS    (2-9 systems for level 4, 10 or more for level 5)    Review of Systems   Constitutional:  Negative for chills and fever. HENT:  Negative for congestion. Eyes:  Negative for visual disturbance. Respiratory:  Negative for shortness of breath. Cardiovascular:  Negative for chest pain. Gastrointestinal:  Negative for abdominal pain, constipation, diarrhea, nausea and vomiting. Genitourinary:  Negative for difficulty urinating and dysuria. Musculoskeletal:  Negative for back pain. Skin:  Negative for wound. Neurological:  Negative for weakness, numbness and headaches.        PHYSICAL EXAM   (up to 7 for level 4, 8 or more for level 5)    INITIAL VITALS:   /87   Pulse 81   Temp 96.9 °F (36.1 °C) (Oral)   Resp 16   Ht 5' 8\" (1.727 m)   Wt 140 lb (63.5 kg)   SpO2 100%   BMI 21.29 kg/m²   I have reviewed the triage vital signs. Const: Well nourished, well developed, appears stated age, no acute distress  Eyes: PERRL, no conjunctival injection  HENT: NCAT, Neck supple without meningismus   CV: RRR, Warm, well-perfused extremities  RESP: CTAB, Unlabored respiratory effort  GI: soft, non-tender, non-distended, no masses. G-tube insertion in the left upper quadrant. No erythema, discharge, bleeding around the insertion site. G-tube does have hole inside of catheter and is leaking gastric content. MSK: No gross deformities appreciated  Skin: Warm, dry. No rashes  Neuro: Alert, CNs II-XII grossly intact. Sensation and motor function of extremities grossly intact. Psych: Appropriate mood and affect. DIFFERENTIAL  DIAGNOSIS   DDX:  Broken G-tube    Initial MDM:  59-year-old male with history of G-tube since 2008 due to mouth cancer. Patient has a broken G-tube. Has been ongoing for 2 to 3 days. Patient would like replacement. No other complaints today. Afebrile. Vital signs stable. Will replace G-tube. PLAN (LABS / IMAGING / EKG):  Orders Placed This Encounter   Procedures    XR INJ CONTRAST GASTRIC TUBE PERC       MEDICATIONS ORDERED:  Orders Placed This Encounter   Medications    diatrizoate meglumine-sodium (GASTROGRAFIN) 66-10 % solution 30 mL         DIAGNOSTIC RESULTS / EMERGENCY DEPARTMENT COURSE / MDM   RADIOLOGY:  XR INJ CONTRAST GASTRIC TUBE PERC    Result Date: 1/9/2023  EXAMINATION: ONE SUPINE XRAY VIEW(S) OF THE ABDOMEN 1/9/2023 1:00 pm COMPARISON: May 19, 2022 HISTORY: ORDERING SYSTEM PROVIDED HISTORY: confirmation for g tube placement TECHNOLOGIST PROVIDED HISTORY: confirmation for g tube placement Reason for Exam: g tube placement FINDINGS: Gastrostomy tube was injected and a single image was obtained. Contrast is noted inside the lumen of the stomach.   The position of gastrostomy tube is adequate. No evidence of extravasation or free air. Nonobstructive bowel gas pattern. Adequate position of gastrostomy tube in the stomach. MDM/EMERGENCY DEPARTMENT COURSE:  Patient's G-tube balloon was deflated and tube was removed. The area was cleaned with Betadine. Lubricant jelly placed over the insertion site in sterile manner. 25 French G-tube was placed in insertion site and sterile. Balloon was inflated with 8 mL saline. When pulling back on G-tube that was secured within the stomach. Patient tolerated procedure well. Gastrografin enhanced x-ray reveals good placement of G-tube. Patient encouraged to follow-up with primary care physician. Given return precautions. Patient discharged home. Patient understands and agrees with the plan. CRITICAL CARE:  Please see Attending Note    FINAL IMPRESSION      1.  Attention to G-tube [facility])          DISPOSITION / PLAN     DISPOSITION Decision To Discharge 01/09/2023 01:27:29 PM    PATIENT REFERRED TO:  Shelby [facility], APRN - CNP  [address]  [phone]    Schedule an appointment as soon as possible for a visit   As needed    [facility] ED  [address] [address]  [phone]  Go to   If symptoms worsen    DISCHARGE MEDICATIONS:  Discharge Medication List as of 1/9/2023  1:30 PM          Rosa Mason DO  Emergency Medicine Resident, PGY 2    (Please note that portions of this note were completed with a voice recognition program.  Efforts were made to edit the dictations but occasionally words are mis-transcribed.)       Rosa Mason DO  Resident  01/09/23 2740

## 2023-07-30 ENCOUNTER — APPOINTMENT (OUTPATIENT)
Dept: GENERAL RADIOLOGY | Age: 53
End: 2023-07-30
Payer: COMMERCIAL

## 2023-07-30 ENCOUNTER — HOSPITAL ENCOUNTER (EMERGENCY)
Age: 53
Discharge: HOME | End: 2023-07-30
Attending: EMERGENCY MEDICINE
Payer: COMMERCIAL

## 2023-07-30 VITALS
DIASTOLIC BLOOD PRESSURE: 69 MMHG | OXYGEN SATURATION: 100 % | HEART RATE: 71 BPM | SYSTOLIC BLOOD PRESSURE: 105 MMHG | TEMPERATURE: 97.3 F | RESPIRATION RATE: 16 BRPM

## 2023-07-30 DIAGNOSIS — K94.23 GASTROSTOMY TUBE DYSFUNCTION (HCC): Primary | ICD-10-CM

## 2023-07-30 PROCEDURE — 43762 RPLC GTUBE NO REVJ TRC: CPT

## 2023-07-30 PROCEDURE — 49465 FLUORO EXAM OF G/COLON TUBE: CPT

## 2023-07-30 PROCEDURE — 6360000004 HC RX CONTRAST MEDICATION: Performed by: STUDENT IN AN ORGANIZED HEALTH CARE EDUCATION/TRAINING PROGRAM

## 2023-07-30 PROCEDURE — 99283 EMERGENCY DEPT VISIT LOW MDM: CPT

## 2023-07-30 RX ADMIN — DIATRIZOATE MEGLUMINE AND DIATRIZOATE SODIUM 30 ML: 660; 100 LIQUID ORAL; RECTAL at 10:50

## 2023-07-30 ASSESSMENT — ENCOUNTER SYMPTOMS
SHORTNESS OF BREATH: 0
ABDOMINAL PAIN: 0

## 2023-07-30 NOTE — DISCHARGE INSTRUCTIONS
Thank you for coming to [facility] emergency department! You were seen today for issue with gastrostomy tube. Please follow up on X-ray results by [place]. If you have any worsening of symptoms or any other concerns, please return to the emergency department. We are always available!

## 2023-07-30 NOTE — ED PROVIDER NOTES
[address] [place] [address] ED  Emergency Department Encounter  Emergency Medicine Resident     Pt Hoa Bowels  MRN: [id number]  [address] [facility] [place] 1970  Date of evaluation: 23  PCP:  QIAN Rodriguez CNP  Note Started: 10:30 AM EDT      CHIEF COMPLAINT       No chief complaint on file. HISTORY OF PRESENT ILLNESS  (Location/Symptom, Timing/Onset, Context/Setting, Quality, Duration, Modifying Factors, Severity.)      Imtiaz Hampton is a 46 y.o. male who presents with gastrostomy tube issue that has been ongoing today. Patient reports that the tubing outside of his body seems to have gotten a hole in it and liquids have been leaking out of the tube. The gastrostomy tube itself is still in place and patient does not believe he has any issues with the balloon or internal issues. Denies any other complaints today was coming in to have it exchanged. It appears patient was here 6 months ago and per note patient has an 25 [place] gastrostomy tube which is inflated with 8 to 10 mL of sterile saline. PAST MEDICAL / SURGICAL / SOCIAL / FAMILY HISTORY      has a past medical history of Cancer [facility]), Colon cancer screening, Colon polyps, and Uses feeding tube. has a past surgical history that includes Mandible reconstruction and Colonoscopy (2018).        Social History     Socioeconomic History    Marital status: Single     Spouse name: Not on file    Number of children: Not on file    Years of education: Not on file    Highest education level: Not on file   Occupational History    Not on file   Tobacco Use    Smoking status: Former     Packs/day: 0.25     Years: 15.00     Pack years: 3.75     Types: Cigarettes     Quit date: 2005     Years since quittin.5    Smokeless tobacco: Never   Vaping Use    Vaping Use: Never used   Substance and Sexual Activity    Alcohol use: Yes     Comment: weekly    Drug use: Yes     Types: Marijuana [place]     Comment: Past hx    Sexual activity:

## 2023-08-18 ENCOUNTER — OFFICE VISIT (OUTPATIENT)
Dept: GASTROENTEROLOGY | Age: 53
End: 2023-08-18
Payer: COMMERCIAL

## 2023-08-18 VITALS
DIASTOLIC BLOOD PRESSURE: 75 MMHG | TEMPERATURE: 97.3 F | SYSTOLIC BLOOD PRESSURE: 125 MMHG | HEART RATE: 65 BPM | HEIGHT: 68 IN | WEIGHT: 127 LBS | BODY MASS INDEX: 19.25 KG/M2

## 2023-08-18 DIAGNOSIS — R63.4 WEIGHT LOSS: Primary | ICD-10-CM

## 2023-08-18 DIAGNOSIS — Z85.9 HISTORY OF MALIGNANT NEOPLASM: ICD-10-CM

## 2023-08-18 DIAGNOSIS — R97.0 ELEVATED CEA: ICD-10-CM

## 2023-08-18 PROCEDURE — G8427 DOCREV CUR MEDS BY ELIG CLIN: HCPCS | Performed by: INTERNAL MEDICINE

## 2023-08-18 PROCEDURE — G8420 CALC BMI NORM PARAMETERS: HCPCS | Performed by: INTERNAL MEDICINE

## 2023-08-18 PROCEDURE — 3017F COLORECTAL CA SCREEN DOC REV: CPT | Performed by: INTERNAL MEDICINE

## 2023-08-18 PROCEDURE — 99204 OFFICE O/P NEW MOD 45 MIN: CPT | Performed by: INTERNAL MEDICINE

## 2023-08-18 PROCEDURE — 1036F TOBACCO NON-USER: CPT | Performed by: INTERNAL MEDICINE

## 2023-08-18 RX ORDER — CHOLECALCIFEROL (VITAMIN D3) 125 MCG
1 CAPSULE ORAL DAILY
COMMUNITY
Start: 2023-07-28

## 2023-08-18 ASSESSMENT — ENCOUNTER SYMPTOMS
RECTAL PAIN: 0
ANAL BLEEDING: 0
WHEEZING: 0
SORE THROAT: 0
ABDOMINAL DISTENTION: 0
SINUS PRESSURE: 0
VOICE CHANGE: 0
VOMITING: 0
DIARRHEA: 0
CHOKING: 0
BACK PAIN: 0
ABDOMINAL PAIN: 0
TROUBLE SWALLOWING: 0
BLOOD IN STOOL: 0
NAUSEA: 0
COUGH: 0
CONSTIPATION: 1

## 2023-08-18 NOTE — PROGRESS NOTES
GI OFFICE FOLLOW UP    INTERVAL HISTORY:   No referring provider defined for this encounter. Chief Complaint   Patient presents with    Weight Loss     Patient is here to be seen for weight loss. Constipation     Patient states that he has been experiencing some constipation. HISTORY OF PRESENT ILLNESS:     Returning patient being seen for weight loss. 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 He is not able to take oral feedings by mouth. He is currently getting feedings via PEG tube. Patient states he was slowly losing weight over the last several years. Weight has not been recorded properly. He states recently he was changed to 7 cans of Resource 2.0 daily. With this he has been slowly gain weight. Goal is to optimize his nutrition for potential bony reconstruction in the future. Patient has a history of chronically elevated CEA. He had a colonoscopy in 2018 significant for tubular adenoma of the rectum. Clinically doing well. Denies any abdominal pain. Cramping, bloating. Tolerating tube feedings well. Reports bowel movements are satisfactory. Denies any melena, hematochezia. Past Medical,Family, and Social History reviewed and does contribute to the patient presenting condition. Patient's PMH/PSH,SH,PSYCH Hx, MEDs, ALLERGIES, and ROS were all reviewed and updated in the appropriate sections.  Yes      PAST MEDICAL HISTORY:  Past Medical History:   Diagnosis Date    Cancer ([address]) 2008    oral     Colon cancer screening     Colon polyps 11/26/2018    tubular adenoma    Uses feeding tube        Past Surgical History:   Procedure Laterality Date    COLONOSCOPY  11/26/2018    tubular adenoma    MANDIBLE RECONSTRUCTION         CURRENT MEDICATIONS:    Current Outpatient Medications:     Cholecalciferol (VITAMIN D3) 50 MCG (2000 UT) TABS, Take

## 2023-08-20 RX ORDER — POLYETHYLENE GLYCOL 3350, SODIUM SULFATE ANHYDROUS, SODIUM BICARBONATE, SODIUM CHLORIDE, POTASSIUM CHLORIDE 236; 22.74; 6.74; 5.86; 2.97 G/4L; G/4L; G/4L; G/4L; G/4L
4 POWDER, FOR SOLUTION ORAL ONCE
Qty: 4000 ML | Refills: 0 | Status: SHIPPED | OUTPATIENT
Start: 2023-08-20 | End: 2023-08-20

## 2023-08-22 ENCOUNTER — HOSPITAL ENCOUNTER (OUTPATIENT)
Dept: PREADMISSION TESTING | Age: 53
Discharge: HOME | End: 2023-08-26

## 2023-08-22 VITALS — BODY MASS INDEX: 18.81 KG/M2 | HEIGHT: 69 IN | WEIGHT: 127 LBS

## 2023-08-22 NOTE — PROGRESS NOTES
Pre-op Instructions For Out-Patient Endoscopy Surgery    Medication Instructions:  Please stop herbs and any supplements now (includes vitamins and minerals). Please contact your surgeon and prescribing physician for pre-op instructions for any blood thinners. If you have inhalers/aerosol treatments at home, please use them the morning of your surgery and bring the inhalers with you to the hospital.    Please take the following medications the morning of your surgery with a sip of water:    none. Surgery Instructions:  After midnight before surgery:  Do not eat or drink anything, including water, mints, gum, and hard candy. You may brush your teeth without swallowing. No smoking, chewing tobacco, or street drugs. Please shower or bathe before surgery. Please do not wear any cologne, lotion, powder, jewelry, piercings, perfume, makeup, nail polish, hair accessories, or hair spray on the day of surgery. Wear loose comfortable clothing. Leave your valuables at home but bring a payment source for any after-surgery prescriptions you plan to fill at [facility]. Bring a storage case for any glasses/contacts. An adult who is responsible for you MUST drive you home and should be with you for the first 24 hours after surgery. The Day of Surgery:  Arrive at [facility] Surgery Entrance at the time directed by your surgeon and check in at the desk. If you have a living will or healthcare power of , please bring a copy. You will be taken to the pre-op holding area where you will be prepared for surgery. A physical assessment will be performed by a nurse practitioner or house officer. Your IV will be started and you will meet your anesthesiologist.    When you go to surgery, your family will be directed to the surgical waiting room, where the doctor should speak with them after your surgery. After surgery, you will be taken to the recovery area.   When

## 2023-08-24 NOTE — PRE-PROCEDURE INSTRUCTIONS
Have you received your Prep? Any questions with prep instructions? Only Clear Liquid Diet day before. Nothing to eat after midnight day before procedure. Are you taking any blood thinners? If so, you need to Stop. Remove any jewelry and body piercings. Do you wear glasses? If so, please bring a case to store them in. Are you having any Covid symptoms? Do you have any new rashes, infections, etc. that we should be aware of? Do you have a ride home the day of surgery? It cannot be a cab or medical transportation. Verify surgery time/date and what time to arrive at hospital.   Left detailed message regarding arrival time, procedure time, clear liquids on Sunday, npo status Sunday at midnight, need for , left pre op phone number to call for any questions.

## 2023-08-25 ENCOUNTER — ANESTHESIA EVENT (OUTPATIENT)
Dept: ENDOSCOPY | Age: 53
End: 2023-08-25
Payer: COMMERCIAL

## 2023-08-25 NOTE — PRE-PROCEDURE INSTRUCTIONS
SECOND CALL    Have you received your Prep? Any questions with prep instructions? Y  Only Clear Liquid Diet day before. Y  Nothing to eat after midnight day before procedure. Y  Are you taking any blood thinners? If so, you need to Stop. N  Remove any jewelry and body piercings. Y  Do you wear glasses? If so, please bring a case to store them in. Are you having any Covid symptoms? N  Do you have any new rashes, infections, etc. that we should be aware of?N  Do you have a ride home the day of surgery? It cannot be a cab or medical transportation. Y  Verify surgery time/date and what time to arrive at hospital. 0717

## 2023-08-28 ENCOUNTER — HOSPITAL ENCOUNTER (OUTPATIENT)
Age: 53
Setting detail: OUTPATIENT SURGERY
Discharge: HOME | End: 2023-08-28
Attending: INTERNAL MEDICINE | Admitting: INTERNAL MEDICINE
Payer: COMMERCIAL

## 2023-08-28 ENCOUNTER — ANESTHESIA (OUTPATIENT)
Dept: ENDOSCOPY | Age: 53
End: 2023-08-28
Payer: COMMERCIAL

## 2023-08-28 VITALS
TEMPERATURE: 97.2 F | OXYGEN SATURATION: 100 % | RESPIRATION RATE: 15 BRPM | DIASTOLIC BLOOD PRESSURE: 69 MMHG | HEIGHT: 69 IN | WEIGHT: 127 LBS | BODY MASS INDEX: 18.81 KG/M2 | SYSTOLIC BLOOD PRESSURE: 98 MMHG | HEART RATE: 57 BPM

## 2023-08-28 PROCEDURE — 6360000002 HC RX W HCPCS

## 2023-08-28 PROCEDURE — 2500000003 HC RX 250 WO HCPCS: Performed by: ANESTHESIOLOGY

## 2023-08-28 PROCEDURE — 3609027000 HC COLONOSCOPY: Performed by: INTERNAL MEDICINE

## 2023-08-28 PROCEDURE — 3700000000 HC ANESTHESIA ATTENDED CARE: Performed by: INTERNAL MEDICINE

## 2023-08-28 PROCEDURE — 2709999900 HC NON-CHARGEABLE SUPPLY: Performed by: INTERNAL MEDICINE

## 2023-08-28 PROCEDURE — 2580000003 HC RX 258: Performed by: ANESTHESIOLOGY

## 2023-08-28 PROCEDURE — 7100000011 HC PHASE II RECOVERY - ADDTL 15 MIN: Performed by: INTERNAL MEDICINE

## 2023-08-28 PROCEDURE — 7100000010 HC PHASE II RECOVERY - FIRST 15 MIN: Performed by: INTERNAL MEDICINE

## 2023-08-28 PROCEDURE — 45330 DIAGNOSTIC SIGMOIDOSCOPY: CPT | Performed by: INTERNAL MEDICINE

## 2023-08-28 PROCEDURE — 2500000003 HC RX 250 WO HCPCS

## 2023-08-28 PROCEDURE — 3700000001 HC ADD 15 MINUTES (ANESTHESIA): Performed by: INTERNAL MEDICINE

## 2023-08-28 RX ORDER — SODIUM CHLORIDE, SODIUM LACTATE, POTASSIUM CHLORIDE, CALCIUM CHLORIDE 600; 310; 30; 20 MG/100ML; MG/100ML; MG/100ML; MG/100ML
INJECTION, SOLUTION INTRAVENOUS CONTINUOUS
Status: DISCONTINUED | OUTPATIENT
Start: 2023-08-28 | End: 2023-08-28 | Stop reason: HOSPADM

## 2023-08-28 RX ORDER — SODIUM CHLORIDE 0.9 % (FLUSH) 0.9 %
5-40 SYRINGE (ML) INJECTION EVERY 12 HOURS SCHEDULED
Status: DISCONTINUED | OUTPATIENT
Start: 2023-08-28 | End: 2023-08-28 | Stop reason: HOSPADM

## 2023-08-28 RX ORDER — PROPOFOL 10 MG/ML
INJECTION, EMULSION INTRAVENOUS PRN
Status: DISCONTINUED | OUTPATIENT
Start: 2023-08-28 | End: 2023-08-28 | Stop reason: SDUPTHER

## 2023-08-28 RX ORDER — SODIUM CHLORIDE 0.9 % (FLUSH) 0.9 %
5-40 SYRINGE (ML) INJECTION PRN
Status: DISCONTINUED | OUTPATIENT
Start: 2023-08-28 | End: 2023-08-28 | Stop reason: HOSPADM

## 2023-08-28 RX ORDER — SODIUM CHLORIDE 9 MG/ML
INJECTION, SOLUTION INTRAVENOUS PRN
Status: DISCONTINUED | OUTPATIENT
Start: 2023-08-28 | End: 2023-08-28 | Stop reason: HOSPADM

## 2023-08-28 RX ORDER — POLYETHYLENE GLYCOL 3350, SODIUM CHLORIDE, POTASSIUM CHLORIDE, SODIUM BICARBONATE, AND SODIUM SULFATE 240; 5.84; 2.98; 6.72; 22.72 G/4L; G/4L; G/4L; G/4L; G/4L
4000 POWDER, FOR SOLUTION ORAL ONCE
Qty: 4000 ML | Refills: 0 | Status: SHIPPED | OUTPATIENT
Start: 2023-08-28 | End: 2023-08-28

## 2023-08-28 RX ORDER — LIDOCAINE HYDROCHLORIDE 20 MG/ML
INJECTION, SOLUTION EPIDURAL; INFILTRATION; INTRACAUDAL; PERINEURAL PRN
Status: DISCONTINUED | OUTPATIENT
Start: 2023-08-28 | End: 2023-08-28 | Stop reason: SDUPTHER

## 2023-08-28 RX ORDER — LIDOCAINE HYDROCHLORIDE 10 MG/ML
1 INJECTION, SOLUTION EPIDURAL; INFILTRATION; INTRACAUDAL; PERINEURAL
Status: COMPLETED | OUTPATIENT
Start: 2023-08-28 | End: 2023-08-28

## 2023-08-28 RX ADMIN — SODIUM CHLORIDE, POTASSIUM CHLORIDE, SODIUM LACTATE AND CALCIUM CHLORIDE: 600; 310; 30; 20 INJECTION, SOLUTION INTRAVENOUS at 08:27

## 2023-08-28 RX ADMIN — LIDOCAINE HYDROCHLORIDE 40 MG: 20 INJECTION, SOLUTION EPIDURAL; INFILTRATION; INTRACAUDAL; PERINEURAL at 10:43

## 2023-08-28 RX ADMIN — PROPOFOL 20 MG: 10 INJECTION, EMULSION INTRAVENOUS at 10:45

## 2023-08-28 RX ADMIN — LIDOCAINE HYDROCHLORIDE 1 ML: 10 INJECTION, SOLUTION EPIDURAL; INFILTRATION; INTRACAUDAL; PERINEURAL at 08:27

## 2023-08-28 RX ADMIN — PROPOFOL 20 MG: 10 INJECTION, EMULSION INTRAVENOUS at 10:44

## 2023-08-28 RX ADMIN — PROPOFOL 50 MG: 10 INJECTION, EMULSION INTRAVENOUS at 10:43

## 2023-08-28 ASSESSMENT — ENCOUNTER SYMPTOMS
RESPIRATORY NEGATIVE: 1
CONSTIPATION: 1
EYES NEGATIVE: 1

## 2023-08-28 ASSESSMENT — PAIN - FUNCTIONAL ASSESSMENT: PAIN_FUNCTIONAL_ASSESSMENT: NONE - DENIES PAIN

## 2023-08-28 NOTE — H&P (VIEW-ONLY)
FAMILY HISTORY       Family History   Problem Relation Age of Onset    High Blood Pressure Mother     Heart Disease Father        SOCIAL HISTORY       Social History     Socioeconomic History    Marital status: Single   Tobacco Use    Smoking status: Former     Packs/day: 0.25     Years: 15.00     Pack years: 3.75     Types: Cigarettes     Quit date: 2005     Years since quittin.6    Smokeless tobacco: Never   Vaping Use    Vaping Use: Never used   Substance and Sexual Activity    Alcohol use: Yes     Comment: rare    Drug use: Yes     Types: Marijuana (Weed)     Comment: at times    Sexual activity: Yes     Partners: Female           REVIEW OF SYSTEMS      No Known Allergies    No current facility-administered medications on file prior to encounter. Current Outpatient Medications on File Prior to Encounter   Medication Sig Dispense Refill    Cholecalciferol (VITAMIN D3) 50 MCG ( UT) TABS Take 1 tablet by mouth daily      senna (SENOKOT) 8.8 MG/5ML SYRP syrup Take 5 mLs by mouth 2 times daily 300 mL 0       Review of Systems   Constitutional: Negative. 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 Respiratory: Negative. Gastrointestinal:  Positive for constipation. Genitourinary: Negative. Musculoskeletal: Negative. Skin: Negative. Neurological: Negative. Hematological: Negative. Does not bruise/bleed easily. Psychiatric/Behavioral: Negative. GENERAL PHYSICAL EXAM     Vitals: see nursing flow sheet for vital sings     GENERAL APPEARANCE:   Good Sorenson is 46 y.o.,  male, not obese, nourished, conscious, alert. Does not appear to be distress or pain at this time. Physical Exam  Constitutional:       General: He is not in acute distress. Appearance: Normal appearance.  He is normal weight. He is not ill-appearing. HENT:      Head: Normocephalic. Right Ear: External ear normal.      Left Ear: External ear normal.      Nose: Nose normal.      Mouth/Throat:      Comments: No teeth upper or lower   Eyes:      General:         Right eye: No discharge. Left eye: No discharge. Cardiovascular:      Rate and Rhythm: Normal rate and regular rhythm. Pulses: Normal pulses. Heart sounds: Normal heart sounds. Pulmonary:      Effort: Pulmonary effort is normal. No respiratory distress. Breath sounds: Normal breath sounds. No wheezing. Abdominal:      General: Bowel sounds are normal. There is no distension. Palpations: Abdomen is soft. There is no mass. Tenderness: There is no abdominal tenderness. Comments: Pt has PEG tube in place, no skin irritation or drainage    Musculoskeletal:         General: No swelling or tenderness. Normal range of motion. Cervical back: Normal range of motion and neck supple. Right lower leg: No edema. Left lower leg: No edema. Skin:     General: Skin is warm and dry. Neurological:      General: No focal deficit present. Mental Status: He is alert and oriented to person, place, and time. Motor: No weakness.       Gait: Gait normal.   Psychiatric:         Mood and Affect: Mood normal.         Behavior: Behavior normal.                 PROVISIONAL DIAGNOSES / SURGERY:      Weight loss [R63.4]    COLONOSCOPY DIAGNOSTIC     Patient Active Problem List    Diagnosis Date Noted    Tubular adenoma 01/09/2019    Back pain 04/24/2013    Abnormal laboratory test result 12/11/2012    Low HDL (under 40) 12/11/2012    Neck pain, chronic 03/15/2012    History of malignant neoplasm 11/20/2011    Alcohol dependence ([address] ) 09/02/2011    Disturbance of consciousness 09/02/2011    Lipoma of skin and subcutaneous tissue (excluding face) 09/02/2011    Malignant neoplastic disease ([address]) 09/02/2011    Mechanical

## 2023-08-28 NOTE — OP NOTE
COLONOSCOPY    DATE OF PROCEDURE: 8/28/2023    SURGEON: Nohemi Reece MD    ASSISTANT: None    PREOPERATIVE DIAGNOSIS: Patient has weight loss, elevated CEA levels. Past history of colon polyps. Procedure performed to evaluate colonic lesions. 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 POSTOPERATIVE DIAGNOSIS: Procedure stopped because of inadequate preparation. OPERATION: Procedure stopped in the sigmoid colon. ANESTHESIA: MAC    ESTIMATED BLOOD LOSS: None    COMPLICATIONS: None     SPECIMENS:  Was Not Obtained    HISTORY: The patient is a 46y.o. year old male with history of above preop diagnosis. I recommended colonoscopy with possible biopsy or polypectomy and I explained the risk, benefits, expected outcome, and alternatives to the procedure. Risks included but are not limited to bleeding, infection, respiratory distress, hypotension, and perforation of the colon and possibility of missing a lesion. The patient understands and is in agreement. PROCEDURE:  The patient's SPO2 remained above 90% throughout the procedure. Digital rectal exam was normal.  The colonoscope was inserted through the anus into the rectum and advanced under direct vision to the sigmoid colon. Because of inadequate preparation procedure stopped. Findings:  Terminal ileum: not examined    Cecum/Ascending colon: not examined    Transverse colon: not examined    Descending/Sigmoid colon: Low amount of thick stool with pellets noted, could not clear, procedure stopped    Rectum/Anus: examined in normal and retroflexed positions and was thick liquid stool could not clear    Withdrawal Time was (minutes): 4          The colon was decompressed and the scope was removed. The patient tolerated the procedure without unusual events. During the procedure, the patient's blood pressure, pulse and oxygen saturation remained stable and documented. No unusual events occurred during the procedure.

## 2023-08-28 NOTE — H&P
HISTORY and [address]       NAME:  Good Sorenson  MRN: [id number]   YOB: 1970   Date: 8/28/2023   Age: 46 y.o. Gender: male       COMPLAINT AND PRESENT HISTORY:     Good Sorenson is 46 y.o.,  male, presents for COLONOSCOPY DIAGNOSTIC     Primary dx: Weight loss [R63.4]. HPI:  Good Sorenson is 46 y.o.,   male, having a Diagnostic Colonoscopy. Prior Colonoscopy was done 2018     Patient denies Colon Polyps or  Diverticulosis. Patient denies any FH of Colon Cancer. 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 Patient reports changes in bowel habits. Pt complain of intermittent constipation , has BM every other day, No GI /Rectal bleeding, experiencing red/ black/ BRBPR stools. Patient has no  history of abd. pain , no nausea or vomiting, no abdominal bloating . Pt has no hx of GERD. Pt denies fever/chills, chest pain or SOB. Review of additional significant medical hx:  (See chart for additional detail, including current medications /see ROS for current S/S):     NPO status: pt NPO since the past midnight   Medications taken TODAY (with sip of water): none  Anticoagulation status: none  Prep fully completed: YES.pt report her LBM is clear liquid   Denies personal hx of blood clots. Denies personal hx of MRSA infection. Denies any personal or family hx of previous complications w/anesthesia.     PAST MEDICAL HISTORY     Past Medical History:   Diagnosis Date    Cancer [facility]) 2008    oral     Colon cancer screening     Colon polyps 11/26/2018    tubular adenoma    Constipation     Dysphagia     Uses feeding tube     Weight loss        SURGICAL HISTORY       Past Surgical History:   Procedure Laterality Date    COLONOSCOPY  11/26/2018    tubular adenoma    INGUINAL HERNIA REPAIR Left     as 8year old    MANDIBLE RECONSTRUCTION

## 2023-08-29 ENCOUNTER — ANESTHESIA (OUTPATIENT)
Dept: ENDOSCOPY | Age: 53
End: 2023-08-29
Payer: COMMERCIAL

## 2023-08-29 ENCOUNTER — ANESTHESIA EVENT (OUTPATIENT)
Dept: ENDOSCOPY | Age: 53
End: 2023-08-29
Payer: COMMERCIAL

## 2023-08-29 ENCOUNTER — HOSPITAL ENCOUNTER (OUTPATIENT)
Age: 53
Setting detail: OUTPATIENT SURGERY
Discharge: HOME | End: 2023-08-29
Attending: INTERNAL MEDICINE | Admitting: INTERNAL MEDICINE
Payer: COMMERCIAL

## 2023-08-29 VITALS
HEIGHT: 69 IN | RESPIRATION RATE: 13 BRPM | TEMPERATURE: 97.7 F | OXYGEN SATURATION: 100 % | DIASTOLIC BLOOD PRESSURE: 74 MMHG | WEIGHT: 127 LBS | HEART RATE: 61 BPM | BODY MASS INDEX: 18.81 KG/M2 | SYSTOLIC BLOOD PRESSURE: 124 MMHG

## 2023-08-29 PROCEDURE — 3700000001 HC ADD 15 MINUTES (ANESTHESIA): Performed by: INTERNAL MEDICINE

## 2023-08-29 PROCEDURE — 3700000000 HC ANESTHESIA ATTENDED CARE: Performed by: INTERNAL MEDICINE

## 2023-08-29 PROCEDURE — 2580000003 HC RX 258: Performed by: ANESTHESIOLOGY

## 2023-08-29 PROCEDURE — 3609027000 HC COLONOSCOPY: Performed by: INTERNAL MEDICINE

## 2023-08-29 PROCEDURE — 2500000003 HC RX 250 WO HCPCS: Performed by: NURSE ANESTHETIST, CERTIFIED REGISTERED

## 2023-08-29 PROCEDURE — 45378 DIAGNOSTIC COLONOSCOPY: CPT | Performed by: INTERNAL MEDICINE

## 2023-08-29 PROCEDURE — 7100000011 HC PHASE II RECOVERY - ADDTL 15 MIN: Performed by: INTERNAL MEDICINE

## 2023-08-29 PROCEDURE — 7100000010 HC PHASE II RECOVERY - FIRST 15 MIN: Performed by: INTERNAL MEDICINE

## 2023-08-29 PROCEDURE — 6360000002 HC RX W HCPCS: Performed by: NURSE ANESTHETIST, CERTIFIED REGISTERED

## 2023-08-29 PROCEDURE — 2500000003 HC RX 250 WO HCPCS: Performed by: ANESTHESIOLOGY

## 2023-08-29 PROCEDURE — 2709999900 HC NON-CHARGEABLE SUPPLY: Performed by: INTERNAL MEDICINE

## 2023-08-29 RX ORDER — LIDOCAINE HYDROCHLORIDE 20 MG/ML
INJECTION, SOLUTION INFILTRATION; PERINEURAL PRN
Status: DISCONTINUED | OUTPATIENT
Start: 2023-08-29 | End: 2023-08-29 | Stop reason: SDUPTHER

## 2023-08-29 RX ORDER — PROPOFOL 10 MG/ML
INJECTION, EMULSION INTRAVENOUS CONTINUOUS PRN
Status: DISCONTINUED | OUTPATIENT
Start: 2023-08-29 | End: 2023-08-29 | Stop reason: SDUPTHER

## 2023-08-29 RX ORDER — FENTANYL CITRATE 0.05 MG/ML
25 INJECTION, SOLUTION INTRAMUSCULAR; INTRAVENOUS EVERY 5 MIN PRN
Status: CANCELLED | OUTPATIENT
Start: 2023-08-29

## 2023-08-29 RX ORDER — ONDANSETRON 2 MG/ML
4 INJECTION INTRAMUSCULAR; INTRAVENOUS
Status: CANCELLED | OUTPATIENT
Start: 2023-08-29 | End: 2023-08-30

## 2023-08-29 RX ORDER — FENTANYL CITRATE 0.05 MG/ML
50 INJECTION, SOLUTION INTRAMUSCULAR; INTRAVENOUS EVERY 5 MIN PRN
Status: CANCELLED | OUTPATIENT
Start: 2023-08-29

## 2023-08-29 RX ORDER — SODIUM CHLORIDE, SODIUM LACTATE, POTASSIUM CHLORIDE, CALCIUM CHLORIDE 600; 310; 30; 20 MG/100ML; MG/100ML; MG/100ML; MG/100ML
INJECTION, SOLUTION INTRAVENOUS CONTINUOUS
Status: DISCONTINUED | OUTPATIENT
Start: 2023-08-29 | End: 2023-08-29 | Stop reason: HOSPADM

## 2023-08-29 RX ORDER — PROPOFOL 10 MG/ML
INJECTION, EMULSION INTRAVENOUS PRN
Status: DISCONTINUED | OUTPATIENT
Start: 2023-08-29 | End: 2023-08-29 | Stop reason: SDUPTHER

## 2023-08-29 RX ORDER — SODIUM CHLORIDE 9 MG/ML
INJECTION, SOLUTION INTRAVENOUS PRN
Status: CANCELLED | OUTPATIENT
Start: 2023-08-29

## 2023-08-29 RX ORDER — LIDOCAINE HYDROCHLORIDE 10 MG/ML
1 INJECTION, SOLUTION EPIDURAL; INFILTRATION; INTRACAUDAL; PERINEURAL ONCE
Status: COMPLETED | OUTPATIENT
Start: 2023-08-29 | End: 2023-08-29

## 2023-08-29 RX ORDER — SODIUM CHLORIDE 0.9 % (FLUSH) 0.9 %
5-40 SYRINGE (ML) INJECTION PRN
Status: CANCELLED | OUTPATIENT
Start: 2023-08-29

## 2023-08-29 RX ORDER — SODIUM CHLORIDE 0.9 % (FLUSH) 0.9 %
5-40 SYRINGE (ML) INJECTION EVERY 12 HOURS SCHEDULED
Status: CANCELLED | OUTPATIENT
Start: 2023-08-29

## 2023-08-29 RX ORDER — DIPHENHYDRAMINE HYDROCHLORIDE 50 MG/ML
12.5 INJECTION INTRAMUSCULAR; INTRAVENOUS
Status: CANCELLED | OUTPATIENT
Start: 2023-08-29 | End: 2023-08-30

## 2023-08-29 RX ADMIN — LIDOCAINE HYDROCHLORIDE 1 ML: 10 INJECTION, SOLUTION EPIDURAL; INFILTRATION; INTRACAUDAL; PERINEURAL at 09:06

## 2023-08-29 RX ADMIN — PROPOFOL 100 MCG/KG/MIN: 10 INJECTION, EMULSION INTRAVENOUS at 10:07

## 2023-08-29 RX ADMIN — SODIUM CHLORIDE, POTASSIUM CHLORIDE, SODIUM LACTATE AND CALCIUM CHLORIDE: 600; 310; 30; 20 INJECTION, SOLUTION INTRAVENOUS at 09:06

## 2023-08-29 RX ADMIN — PROPOFOL 40 MG: 10 INJECTION, EMULSION INTRAVENOUS at 10:07

## 2023-08-29 RX ADMIN — LIDOCAINE HYDROCHLORIDE 40 MG: 20 INJECTION, SOLUTION EPIDURAL; INFILTRATION; INTRACAUDAL; PERINEURAL at 10:07

## 2023-08-29 ASSESSMENT — PAIN - FUNCTIONAL ASSESSMENT: PAIN_FUNCTIONAL_ASSESSMENT: 0-10

## 2023-08-29 NOTE — OP NOTE
COLONOSCOPY    DATE OF PROCEDURE: 8/29/2023    SURGEON: Khadijah Calderon MD    ASSISTANT: None    PREOPERATIVE DIAGNOSIS: Patient has weight loss, has elevated CEA levels. Known to have colon polyps. 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 Procedure performed to check colonic lesions    POSTOPERATIVE DIAGNOSIS: No lesions seen that can account for patient issues    OPERATION: Total colonoscopy     ANESTHESIA: MAC    ESTIMATED BLOOD LOSS: None    COMPLICATIONS: None     SPECIMENS:  Was Not Obtained    HISTORY: The patient is a 46y.o. year old male with history of above preop diagnosis. I recommended colonoscopy with possible biopsy or polypectomy and I explained the risk, benefits, expected outcome, and alternatives to the procedure. Risks included but are not limited to bleeding, infection, respiratory distress, hypotension, and perforation of the colon and possibility of missing a lesion. The patient understands and is in agreement. PROCEDURE:  The patient's SPO2 remained above 90% throughout the procedure. Digital rectal exam was normal.  The colonoscope was inserted through the anus into the rectum and advanced under direct vision to the cecum without difficulty. Terminal ileum was examined for approximately 2 inches. The prep was good. Findings:  Terminal ileum: normal    Cecum/Ascending colon: normal, also examined in the retroflexed view    Transverse colon: normal    Descending/Sigmoid colon: normal    Rectum/Anus: examined in normal and retroflexed positions and was normal    Withdrawal Time was (minutes): 12          The colon was decompressed and the scope was removed. The patient tolerated the procedure without unusual events. During the procedure, the patient's blood pressure, pulse and oxygen saturation remained stable and documented. No unusual events occurred during the procedure.  Patient was transferred to recovery room and will be discharged when criteria is

## 2023-08-29 NOTE — INTERVAL H&P NOTE
Update History & Physical    The patient's History and Physical of August 28, 23 was reviewed with the patient and I examined the patient. There was no change. The surgical site was confirmed by the patient and me. Pt undergoing for COLONOSCOPY DIAGNOSTIC per Dr Hayley Parker  Pt denies fever/chills, chest pain or SOB  Pt NPO since the past midnight, no am medication today   Pt states today he finished all his prep  and his last BM looks like water  Denies personal hx of blood clots. Denies personal hx of MRSA infection. Denies any personal or family hx of previous complications w/anesthesia. Physical exam remain unchanged including cardiac and pulmonary assessment  See nursing flow sheet for vital sings     Lab Results   Component Value Date    WBC 3.0 (L) 04/09/2021    HGB 14.4 04/09/2021    HCT 43.2 04/09/2021    MCV 97.7 04/09/2021     04/09/2021     Lab Results   Component Value Date/Time     04/09/2021 01:25 PM    K 3.9 04/09/2021 01:25 PM     04/09/2021 01:25 PM    CO2 28 04/09/2021 01:25 PM    BUN 16 04/09/2021 01:25 PM    CREATININE 0.42 04/09/2021 01:25 PM    GLUCOSE 69 04/09/2021 01:25 PM    CALCIUM 9.3 04/09/2021 01:25 PM    LABGLOM >60 04/09/2021 01:25 PM             Plan: The risks, benefits, expected outcome, and alternative to the recommended procedure have been discussed with the patient. Patient understands and wants to proceed with the procedure.      Electronically signed by QIAN Pineda CNP on 8/29/2023 at 8:12 AM

## 2023-10-25 ENCOUNTER — TELEPHONE (OUTPATIENT)
Dept: OTOLARYNGOLOGY | Facility: CLINIC | Age: 53
End: 2023-10-25
Payer: COMMERCIAL

## 2023-10-25 NOTE — TELEPHONE ENCOUNTER
Returned call to Chris and left a vm with my office number so that we can coordinate appt to see Dr. David for surgery discussion.

## 2023-11-01 ENCOUNTER — TELEPHONE (OUTPATIENT)
Dept: OTOLARYNGOLOGY | Facility: CLINIC | Age: 53
End: 2023-11-01
Payer: COMMERCIAL

## 2023-11-01 NOTE — TELEPHONE ENCOUNTER
Left another voicemail for Chris to try and coordinate an appt to see Dr. David. I provided my office number, but will try to call again at a later time.

## 2023-11-11 PROBLEM — Z85.819 HISTORY OF ORAL CANCER: Status: ACTIVE | Noted: 2023-11-11

## 2023-11-11 RX ORDER — CHLORHEXIDINE GLUCONATE ORAL RINSE 1.2 MG/ML
10 SOLUTION DENTAL 3 TIMES DAILY
Status: ON HOLD | COMMUNITY
Start: 2023-03-15 | End: 2024-02-28 | Stop reason: WASHOUT

## 2023-11-11 RX ORDER — OMEGA-3S/DHA/EPA/FISH OIL/D3 300MG-1000
2000 CAPSULE ORAL DAILY
Status: ON HOLD | COMMUNITY
Start: 2023-07-28 | End: 2024-02-28 | Stop reason: SDUPTHER

## 2023-11-13 ENCOUNTER — OFFICE VISIT (OUTPATIENT)
Dept: OTOLARYNGOLOGY | Facility: CLINIC | Age: 53
End: 2023-11-13
Payer: COMMERCIAL

## 2023-11-13 VITALS — WEIGHT: 155.8 LBS | TEMPERATURE: 97.3 F | HEIGHT: 68 IN | BODY MASS INDEX: 23.61 KG/M2

## 2023-11-13 DIAGNOSIS — K13.70 ORAL LESION: ICD-10-CM

## 2023-11-13 PROCEDURE — 88312 SPECIAL STAINS GROUP 1: CPT | Mod: TC,SUR | Performed by: OTOLARYNGOLOGY

## 2023-11-13 PROCEDURE — 1036F TOBACCO NON-USER: CPT | Performed by: OTOLARYNGOLOGY

## 2023-11-13 PROCEDURE — 99213 OFFICE O/P EST LOW 20 MIN: CPT | Performed by: OTOLARYNGOLOGY

## 2023-11-13 PROCEDURE — 88305 TISSUE EXAM BY PATHOLOGIST: CPT | Performed by: DENTIST

## 2023-11-13 PROCEDURE — 40808 BIOPSY OF MOUTH LESION: CPT | Performed by: OTOLARYNGOLOGY

## 2023-11-13 ASSESSMENT — PATIENT HEALTH QUESTIONNAIRE - PHQ9
2. FEELING DOWN, DEPRESSED OR HOPELESS: NOT AT ALL
SUM OF ALL RESPONSES TO PHQ9 QUESTIONS 1 & 2: 0
1. LITTLE INTEREST OR PLEASURE IN DOING THINGS: NOT AT ALL

## 2023-11-13 NOTE — PROGRESS NOTES
ENT Follow up Visit    History Of Present Illness  Chris Fitzgerald is a 53 y.o. male presents for follow up    52-year-old male referred by Dr. 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 This included a segmental mandibulectomy, neck dissection, floor mouth resection, left fibular flap reconstruction. Records from 2008 were reviewed and there were some postoperative complications related to a fistula. It is unclear whether or not they felt the flap was dead at that time. He has not had any sign of recurrence since that time. He has been having more oral cavity pain and exposed mandibular hardware was identified. He had some biopsies performed by Dr. Pandya that were negative for cancer. He is PEG dependent     Past medical history includes hyperlipidemia, oral cavity cancer, depression. Prior surgeriesn include above. Family history reviewed and significant for cardiac disease. He has remote history of smoking. He occasionally uses marijuana. Social drinking     6/12/23: Patient returns for follow-up after scans. This was personally reviewed. The plate extends up the mandibular ramus bilaterally. The underlying bone actually looks fused and there does not appear to be any significant abnormality. He had a CTA of the lower extremities as well there are some difficulty in uploading the images for review     11/13/23: Patient returns for follow-up.  He got established with his primary care physician.  He has also gained some weight since his last visit.  He is interested in proceeding with surgery     Past Medical History  He has no past medical history on file.    Surgical History  He has no past surgical history on file.     Social History  He has no history on file for tobacco use, alcohol use, and drug use.    Family History  No family history on file.      Allergies  Patient has no known allergies.     Physical Exam:  CONSTITUTIONAL: Vitals -reviewed from intake field, well developed, well nourished.   VOICE: Slightly muffled due to surgical changes in the oral cavity  RESPIRATION: Breathing comfortably, no stridor.   CV: No clubbing/cyanosis/edema in hands.   EYES: EOM Intact, sclera normal.   NEURO: Alert and oriented times 3, Cranial nerves II-XII intact and symmetric bilaterally.   HEAD AND FACE: Symmetric facial features, no masses or lesions, sinuses nontender to palpation.   SALIVARY GLANDS: Parotid and submandibular glands normal bilaterally.   EARS: Normal external ears, external auditory canals, and TMs to otoscopy, normal hearing to whispered voice.   NOSE: External nose midline, anterior rhinoscopy is normal with limited visualization to the anterior aspect of the interior turbinates. No lesions noted.   ORAL CAVITY/OROPHARYNX/LIPS: Surgical changes throughout the oral cavity. There is an exposed intraoral plate along the sulcus bilaterally.  The tongue is tethered and he does not have any functional movement. There is some granulation tissue surrounding the plate  PHARYNGEAL WALLS: Difficult to visualize  NECK/LYMPH: Significant scarring to the neck. No palpable nodes  SKIN: Neck skin is without scar or injury   PSYCH: Alert and oriented with appropriate mood and affect     Procedure: Biopsy of oral lesion    Verbal consent was obtained for biopsy.  Patient does not have much sensation around the surgical site.  Therefore cup forceps were used to obtain a sample of the granulation tissue around the plate.  There is minimal bleeding.  Patient tolerated the procedure well     Assessment and Plan  53 y.o. male with history of composite resection, fibular flap, radiation back in 2008. He is now referred for exposed mandibular hardware.     His CT scan was reviewed and the mandibular hardware extends from condyle to condyle in the anterior portion is the  only portion that is clearly visualized. The bone does not have lytic changes throughout     We discussed different scenarios including removal of the hardware with coverage of the underlying bone versus complete revision of the reconstruction. We discussed how this would need to be determined intraoperatively.      -On review of the imaging the plate seems to be exposed because there is space between the underlying bone and the plate. Removing the entire plate would be a fairly involved dissection and he may benefit from just removing the exposed portion and coverage in that area. He does not have good oral cavity function and I do not think surgery would change that so the goal should be to remove the exposed hardware and create a safe wound . We discussed surgery as well as indications for soft tissue versus bony reconstruction. We will start the scheduling process.  Biopsy was obtained today to rule out cancer however it does look like granulation tissue around the plate    Roderick David MD

## 2023-11-24 ENCOUNTER — TELEPHONE (OUTPATIENT)
Dept: OPERATING ROOM | Facility: HOSPITAL | Age: 53
End: 2023-11-24
Payer: COMMERCIAL

## 2023-11-24 DIAGNOSIS — Z85.819 HISTORY OF ORAL CANCER: ICD-10-CM

## 2023-11-24 NOTE — TELEPHONE ENCOUNTER
Spoke to patient. He is looking for a refill of his oxycodone 5 mg. Dr. David last prescribed it to him at his visit in June. Patient states he has been off it for awhile, but the pain in his mouth has gotten worse since his last visit with Dr. David 11/13/23. I let him know that I will reach out to Dr. David and ask.    Dr. David states that he is out of town traveling and will not be able to sign. He states that it can be done tomorrow when he is able to sign. But also the patient needs a pain management referral, and to follow up with someone closer to home to manage his pain. I have notified the patient of this, and he is in agreement with plan.

## 2023-11-25 RX ORDER — OXYCODONE HYDROCHLORIDE 5 MG/1
5 TABLET ORAL EVERY 6 HOURS PRN
Qty: 15 TABLET | Refills: 0 | Status: SHIPPED | OUTPATIENT
Start: 2023-11-25 | End: 2023-12-02

## 2023-11-30 LAB
LABORATORY COMMENT REPORT: NORMAL
PATH REPORT.FINAL DX SPEC: NORMAL
PATH REPORT.GROSS SPEC: NORMAL
PATH REPORT.RELEVANT HX SPEC: NORMAL
PATH REPORT.TOTAL CANCER: NORMAL

## 2023-11-30 PROCEDURE — 88312 SPECIAL STAINS GROUP 1: CPT | Performed by: DENTIST

## 2023-12-18 ENCOUNTER — APPOINTMENT (OUTPATIENT)
Dept: PAIN MEDICINE | Facility: CLINIC | Age: 53
End: 2023-12-18
Payer: COMMERCIAL

## 2024-01-31 ENCOUNTER — TELEPHONE (OUTPATIENT)
Dept: OTOLARYNGOLOGY | Facility: CLINIC | Age: 54
End: 2024-01-31
Payer: COMMERCIAL

## 2024-01-31 NOTE — TELEPHONE ENCOUNTER
Spoke with Chris to see if we could coordinate a date for surgery, he is agreeable to 2/28 at [facility]. He is aware he will need to come down for a separate appt prior to surgery for cpm. Will work on submitting so we have some flexibility for setting up an appt that works for him considering he is driving from [place]. Encouraged him to call with any questions or concerns.

## 2024-02-07 DIAGNOSIS — T84.498A EXPOSED ORTHOPAEDIC HARDWARE (CMS-HCC): ICD-10-CM

## 2024-02-13 NOTE — H&P (VIEW-ONLY)
Chief Complaint   Patient presents with    Mouth Lesions     HPI:  Chris Fitzgerald is a 53 y.o. male seeing me today for pre operative discussion. He is scheduled for DL, removal and possible replacement of mandibular hardware, trach, neck exploration for vessels, reconstruction with free flap on 2/28 with Dr. David and myself. Patient has history of oral cavity cancer that was treated with surgery which included a segmental mandibulectomy, neck dissection, floor mouth resection, left fibular free flap reconstruction followed by adjuvant treatment back in 2008. He has not had any recurrence since. He has been having more oral cavity pain and ORN and exposed mandibular hardware was identified. He had biopsies that were negative for cancer. He is PEG dependent. Patient is right handed. Good gigi test on the left.     ROS:  Review of Systems   Constitutional:  Negative for appetite change, chills, fatigue, fever and unexpected weight change.   HENT:  Positive for drooling, mouth sores and trouble swallowing. Negative for dental problem, ear pain, facial swelling, hearing loss, sore throat, tinnitus and voice change.    Respiratory:  Negative for cough, shortness of breath and stridor.    Gastrointestinal:  Negative for nausea and vomiting.   Musculoskeletal:  Negative for neck pain.   Hematological:  Negative for adenopathy.        PE:  ENT Physical Exam  Constitutional  Appearance: patient appears well-developed, cachectic, patient is cooperative;  Communication/Voice: Communication comments: understandable but difficult for him to speak due to tongue & lip tethering  Head and Face  Appearance: head appears normal; facial scars present;  Ear  Auricles: right auricle normal; left auricle normal;  Ear Canals: right ear canal normal; left ear canal normal;  Tympanic Membranes: right tympanic membrane normal; left tympanic membrane normal;  Nose  External Nose: nares patent bilaterally; external nose normal;  Internal  Nose: nasal mucosa normal; septum normal;  Oral Cavity/Oropharynx  OC/OP comments: There is +lower lip tethering and loss of volume near midline lower lip - this results in decreased oral competence.  Tethering of the tongue.  There is fibula skin paddle along the anterior FOM.  There is exposed mandibular plate from the midline which extends along the left lateral mandible.    Neck  Neck: scars present;  Neck comments: Neck is very thin, +fibrosis  Respiratory  Inspection: breathing unlabored;  Cardiovascular  Inspection: extremities are warm and well perfused;  Lymphatic  Palpation: no cervical adenopathy noted;  Neurovestibular  Mental Status: alert and oriented;  Psychiatric: mood normal; affect is appropriate;  Cranial Nerves: cranial nerves intact;       Procedures     ASSESSMENT AND PLAN:  Problem List Items Addressed This Visit       History of oral cancer - Primary    Current Assessment & Plan     No evidence of recurrence, biopsies are negative         Exposed orthopaedic hardware (CMS/HCC)    Current Assessment & Plan     Mandibular plate exposed   Recommend excision  This will require surgical resection with plate removal with possible free tissue transfer/free flap reconstruction for soft tissue coverage.  He is right handed with good Devon's test.  Recommend LRRR or LALT.  Discussed diagnosis, surgical recommendations including extent of the procedures and need for trach were extensively reviewed.  He already has a PEG.  Discussed the risks and benefits of surgery as well as alternative treatment options.  Discussed perioperative expectations regarding hospital stay and discharge.  I did not provide any guarantees regarding outcomes but I do feel that with  reconstruction the patient will have the best chance to heal.  Oral competence will be hard to regain but at least he will improve from where he is now.  However the timing to when tracheotomy/PEG will be removed (or if PEG will ever be able to be  removed) or when speech/swallowing will return and degree of any longterm impairment will not be able to be determined until after surgery.          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 Wilda Cedeño MD.     All medical record entries made by the Scribe were at my direction and personally dictated by me, Dr. Wilda Cedeño. I have reviewed the chart and agree that the record accurately reflects my personal performance of the history, physical exam, discussion and plan.

## 2024-02-13 NOTE — PROGRESS NOTES
Chief Complaint   Patient presents with    Mouth Lesions     HPI:  Chris Fitzgerald is a 53 y.o. male seeing me today for pre operative discussion. He is scheduled for DL, removal and possible replacement of mandibular hardware, trach, neck exploration for vessels, reconstruction with free flap on 2/28 with Dr. David and myself. Patient has history of oral cavity cancer that was treated with surgery which included a segmental mandibulectomy, neck dissection, floor mouth resection, left fibular free flap reconstruction followed by adjuvant treatment back in 2008. He has not had any recurrence since. He has been having more oral cavity pain and ORN and exposed mandibular hardware was identified. He had biopsies that were negative for cancer. He is PEG dependent. Patient is right handed. Good gigi test on the left.     ROS:  Review of Systems   Constitutional:  Negative for appetite change, chills, fatigue, fever and unexpected weight change.   HENT:  Positive for drooling, mouth sores and trouble swallowing. Negative for dental problem, ear pain, facial swelling, hearing loss, sore throat, tinnitus and voice change.    Respiratory:  Negative for cough, shortness of breath and stridor.    Gastrointestinal:  Negative for nausea and vomiting.   Musculoskeletal:  Negative for neck pain.   Hematological:  Negative for adenopathy.        PE:  ENT Physical Exam  Constitutional  Appearance: patient appears well-developed, cachectic, patient is cooperative;  Communication/Voice: Communication comments: understandable but difficult for him to speak due to tongue & lip tethering  Head and Face  Appearance: head appears normal; facial scars present;  Ear  Auricles: right auricle normal; left auricle normal;  Ear Canals: right ear canal normal; left ear canal normal;  Tympanic Membranes: right tympanic membrane normal; left tympanic membrane normal;  Nose  External Nose: nares patent bilaterally; external nose normal;  Internal  Nose: nasal mucosa normal; septum normal;  Oral Cavity/Oropharynx  OC/OP comments: There is +lower lip tethering and loss of volume near midline lower lip - this results in decreased oral competence.  Tethering of the tongue.  There is fibula skin paddle along the anterior FOM.  There is exposed mandibular plate from the midline which extends along the left lateral mandible.    Neck  Neck: scars present;  Neck comments: Neck is very thin, +fibrosis  Respiratory  Inspection: breathing unlabored;  Cardiovascular  Inspection: extremities are warm and well perfused;  Lymphatic  Palpation: no cervical adenopathy noted;  Neurovestibular  Mental Status: alert and oriented;  Psychiatric: mood normal; affect is appropriate;  Cranial Nerves: cranial nerves intact;       Procedures     ASSESSMENT AND PLAN:  Problem List Items Addressed This Visit       History of oral cancer - Primary    Current Assessment & Plan     No evidence of recurrence, biopsies are negative         Exposed orthopaedic hardware (CMS/HCC)    Current Assessment & Plan     Mandibular plate exposed   Recommend excision  This will require surgical resection with plate removal with possible free tissue transfer/free flap reconstruction for soft tissue coverage.  He is right handed with good Devon's test.  Recommend LRRR or LALT.  Discussed diagnosis, surgical recommendations including extent of the procedures and need for trach were extensively reviewed.  He already has a PEG.  Discussed the risks and benefits of surgery as well as alternative treatment options.  Discussed perioperative expectations regarding hospital stay and discharge.  I did not provide any guarantees regarding outcomes but I do feel that with  reconstruction the patient will have the best chance to heal.  Oral competence will be hard to regain but at least he will improve from where he is now.  However the timing to when tracheotomy/PEG will be removed (or if PEG will ever be able to be  removed) or when speech/swallowing will return and degree of any longterm impairment will not be able to be determined until after surgery.          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 Wilda Cedeño MD.     All medical record entries made by the Scribe were at my direction and personally dictated by me, Dr. Wilda Cedeño. I have reviewed the chart and agree that the record accurately reflects my personal performance of the history, physical exam, discussion and plan.

## 2024-02-21 ENCOUNTER — OFFICE VISIT (OUTPATIENT)
Dept: OTOLARYNGOLOGY | Facility: HOSPITAL | Age: 54
End: 2024-02-21
Payer: COMMERCIAL

## 2024-02-21 ENCOUNTER — PRE-ADMISSION TESTING (OUTPATIENT)
Dept: PREADMISSION TESTING | Facility: HOSPITAL | Age: 54
End: 2024-02-21
Payer: COMMERCIAL

## 2024-02-21 VITALS
TEMPERATURE: 96 F | DIASTOLIC BLOOD PRESSURE: 81 MMHG | BODY MASS INDEX: 25.53 KG/M2 | OXYGEN SATURATION: 99 % | SYSTOLIC BLOOD PRESSURE: 143 MMHG | HEIGHT: 68 IN | WEIGHT: 168.43 LBS | HEART RATE: 63 BPM

## 2024-02-21 VITALS — WEIGHT: 168.43 LBS | HEIGHT: 68 IN | BODY MASS INDEX: 25.53 KG/M2 | TEMPERATURE: 96 F

## 2024-02-21 DIAGNOSIS — Z01.818 PREOPERATIVE TESTING: Primary | ICD-10-CM

## 2024-02-21 DIAGNOSIS — Z85.819 HISTORY OF ORAL CANCER: Primary | ICD-10-CM

## 2024-02-21 DIAGNOSIS — C04.9: ICD-10-CM

## 2024-02-21 DIAGNOSIS — T84.498A EXPOSED ORTHOPAEDIC HARDWARE (CMS-HCC): ICD-10-CM

## 2024-02-21 LAB
ABO GROUP (TYPE) IN BLOOD: NORMAL
ANION GAP SERPL CALC-SCNC: 13 MMOL/L (ref 10–20)
ANTIBODY SCREEN: NORMAL
APTT PPP: 36 SECONDS (ref 27–38)
BUN SERPL-MCNC: 19 MG/DL (ref 6–23)
CALCIUM SERPL-MCNC: 9.8 MG/DL (ref 8.6–10.6)
CHLORIDE SERPL-SCNC: 104 MMOL/L (ref 98–107)
CO2 SERPL-SCNC: 30 MMOL/L (ref 21–32)
CREAT SERPL-MCNC: 0.64 MG/DL (ref 0.5–1.3)
EGFRCR SERPLBLD CKD-EPI 2021: >90 ML/MIN/1.73M*2
ERYTHROCYTE [DISTWIDTH] IN BLOOD BY AUTOMATED COUNT: 11.9 % (ref 11.5–14.5)
GLUCOSE SERPL-MCNC: 71 MG/DL (ref 74–99)
HCT VFR BLD AUTO: 48.4 % (ref 41–52)
HGB BLD-MCNC: 16 G/DL (ref 13.5–17.5)
INR PPP: 1 (ref 0.9–1.1)
MCH RBC QN AUTO: 32.9 PG (ref 26–34)
MCHC RBC AUTO-ENTMCNC: 33.1 G/DL (ref 32–36)
MCV RBC AUTO: 100 FL (ref 80–100)
NRBC BLD-RTO: 0 /100 WBCS (ref 0–0)
PLATELET # BLD AUTO: 286 X10*3/UL (ref 150–450)
POTASSIUM SERPL-SCNC: 4 MMOL/L (ref 3.5–5.3)
PROTHROMBIN TIME: 11.1 SECONDS (ref 9.8–12.8)
RBC # BLD AUTO: 4.86 X10*6/UL (ref 4.5–5.9)
RH FACTOR (ANTIGEN D): NORMAL
SODIUM SERPL-SCNC: 143 MMOL/L (ref 136–145)
WBC # BLD AUTO: 4.6 X10*3/UL (ref 4.4–11.3)

## 2024-02-21 PROCEDURE — 87081 CULTURE SCREEN ONLY: CPT

## 2024-02-21 PROCEDURE — 85610 PROTHROMBIN TIME: CPT

## 2024-02-21 PROCEDURE — 99204 OFFICE O/P NEW MOD 45 MIN: CPT | Performed by: NURSE PRACTITIONER

## 2024-02-21 PROCEDURE — 1036F TOBACCO NON-USER: CPT | Performed by: OTOLARYNGOLOGY

## 2024-02-21 PROCEDURE — 36415 COLL VENOUS BLD VENIPUNCTURE: CPT

## 2024-02-21 PROCEDURE — 99214 OFFICE O/P EST MOD 30 MIN: CPT | Performed by: OTOLARYNGOLOGY

## 2024-02-21 PROCEDURE — 86901 BLOOD TYPING SEROLOGIC RH(D): CPT

## 2024-02-21 PROCEDURE — 80048 BASIC METABOLIC PNL TOTAL CA: CPT

## 2024-02-21 PROCEDURE — 85027 COMPLETE CBC AUTOMATED: CPT

## 2024-02-21 RX ORDER — CHLORHEXIDINE GLUCONATE ORAL RINSE 1.2 MG/ML
SOLUTION DENTAL
Qty: 15 ML | Refills: 0 | Status: SHIPPED | OUTPATIENT
Start: 2024-02-21 | End: 2024-02-29 | Stop reason: HOSPADM

## 2024-02-21 RX ORDER — CHLORHEXIDINE GLUCONATE 40 MG/ML
SOLUTION TOPICAL
Qty: 473 ML | Refills: 0 | Status: SHIPPED | OUTPATIENT
Start: 2024-02-21 | End: 2024-02-29 | Stop reason: HOSPADM

## 2024-02-21 ASSESSMENT — ENCOUNTER SYMPTOMS
CHILLS: 0
TROUBLE SWALLOWING: 1
NAUSEA: 0
FEVER: 0
GASTROINTESTINAL NEGATIVE: 1
VOMITING: 0
RESPIRATORY NEGATIVE: 1
ROS GI COMMENTS: PEG
MUSCULOSKELETAL NEGATIVE: 1
FACIAL SWELLING: 0
NECK PAIN: 0
NECK NEGATIVE: 1
CONSTITUTIONAL NEGATIVE: 1
APPETITE CHANGE: 0
SORE THROAT: 0
VOICE CHANGE: 0
EYES NEGATIVE: 1
TROUBLE SWALLOWING: 1
SHORTNESS OF BREATH: 0
ADENOPATHY: 0
NEUROLOGICAL NEGATIVE: 1
STRIDOR: 0
ENDOCRINE NEGATIVE: 1
CARDIOVASCULAR NEGATIVE: 1
UNEXPECTED WEIGHT CHANGE: 0
FATIGUE: 0
COUGH: 0

## 2024-02-21 ASSESSMENT — CHADS2 SCORE
CHF: NO
DIABETES: NO
AGE GREATER THAN OR EQUAL TO 75: NO
HYPERTENSION: NO
PRIOR STROKE OR TIA OR THROMBOEMBOLISM: NO
CHADS2 SCORE: 0

## 2024-02-21 ASSESSMENT — PATIENT HEALTH QUESTIONNAIRE - PHQ9
1. LITTLE INTEREST OR PLEASURE IN DOING THINGS: NOT AT ALL
SUM OF ALL RESPONSES TO PHQ9 QUESTIONS 1 & 2: 0
2. FEELING DOWN, DEPRESSED OR HOPELESS: NOT AT ALL

## 2024-02-21 ASSESSMENT — DUKE ACTIVITY SCORE INDEX (DASI)
CAN YOU DO LIGHT WORK AROUND THE HOUSE LIKE DUSTING OR WASHING DISHES: YES
CAN YOU PARTICIPATE IN MODERATE RECREATIONAL ACTIVITIES LIKE GOLF, BOWLING, DANCING, DOUBLES TENNIS OR THROWING A BASEBALL OR FOOTBALL: YES
CAN YOU HAVE SEXUAL RELATIONS: YES
CAN YOU DO MODERATE WORK AROUND THE HOUSE LIKE VACUUMING, SWEEPING FLOORS OR CARRYING GROCERIES: YES
CAN YOU TAKE CARE OF YOURSELF (EAT, DRESS, BATHE, OR USE TOILET): YES
TOTAL_SCORE: 45.7
CAN YOU PARTICIPATE IN STRENOUS SPORTS LIKE SWIMMING, SINGLES TENNIS, FOOTBALL, BASKETBALL, OR SKIING: YES
CAN YOU CLIMB A FLIGHT OF STAIRS OR WALK UP A HILL: YES
CAN YOU DO YARD WORK LIKE RAKING LEAVES, WEEDING OR PUSHING A MOWER: NO
CAN YOU RUN A SHORT DISTANCE: YES
CAN YOU WALK INDOORS, SUCH AS AROUND YOUR HOUSE: YES
CAN YOU DO HEAVY WORK AROUND THE HOUSE LIKE SCRUBBING FLOORS OR LIFTING AND MOVING HEAVY FURNITURE: NO
CAN YOU WALK A BLOCK OR TWO ON LEVEL GROUND: YES
DASI METS SCORE: 8.4

## 2024-02-21 ASSESSMENT — LIFESTYLE VARIABLES: SMOKING_STATUS: NONSMOKER

## 2024-02-21 NOTE — CPM/PAT H&P
CPM/PAT Evaluation       Name: Chris Fitzgerald (Chris Fitzgerald)  /Age: 1970/53 y.o.     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 He has not had any sign of recurrence since that time. He has been having more oral cavity pain and exposed mandibular hardware was identified. He had some biopsies performed by Dr. Pandya that were negative for cancer. He is PEG dependent. He presents today for perioperative evaluation in anticipation of direct laryngoscopy, creation tracheostomy, hand carpal scaphoid bone graft/int fix, lower extremity free flap on 24 with Dr. David/Dr. Cedeño.     Past Medical History:   Diagnosis Date    Acute back pain     Depression     Dysphagia     Edentulous     Gastrostomy tube dependent (CMS/HCC)     Dietician  Purnima Banegas at [facility]    History of malignant neoplasm of oral cavity     treated with surgery followed by adjuvant treatment back in .    History of radiation therapy     Shoulder pain     Vision loss     wears glasses       Past Surgical History:   Procedure Laterality Date    COLONOSCOPY      GASTROSTOMY TUBE PLACEMENT  2023    HERNIA REPAIR      MODIFIED RADICAL NECK DISSECTION         Patient  reports being sexually active.    Family History   Problem Relation Name Age of Onset    Stroke Mother      Kidney disease Mother      Hypertension Mother      Heart disease Mother      Breast cancer Mother      Heart disease Father         No Known Allergies    Prior to Admission medications    Medication Sig Start Date End Date Taking? Authorizing Provider   chlorhexidine (Peridex) 0.12 % solution Use 10 mL in the mouth or throat 3 times a day. 3/15/23   Historical Provider, MD   Vitamin D3 50 mcg (2,000 unit) tablet Take 1 tablet (2,000 Units) by mouth once daily. 23   Historical Provider, MD         PAT ROS:   Constitutional:   neg    Neuro/Psych:   neg    Eyes:   neg     use of corrective lenses  Ears:   Nose:   neg    Mouth:   neg    Throat:   neg     dysphagia  Neck:   neg    Cardio:   neg    Respiratory:   neg    Endocrine:   neg    GI:    PEG  neg    :   neg    Musculoskeletal:   neg    Hematologic:   neg    Skin:  neg        Physical Exam  Vitals reviewed.   Constitutional:       Appearance: Normal appearance.   HENT:      Head: Normocephalic and atraumatic.      Nose: Nose normal.      Mouth/Throat:      Mouth: Mucous membranes are moist.      Pharynx: Oropharynx is clear.   Eyes:      Extraocular Movements: Extraocular movements intact.      Pupils: Pupils are equal, round, and reactive to light.   Cardiovascular:      Rate and Rhythm: Normal rate and regular rhythm.      Pulses: Normal pulses.      Heart sounds: Normal heart sounds.   Pulmonary:      Effort: Pulmonary effort is normal.      Breath sounds: Normal breath sounds.   Musculoskeletal:         General: Normal range of motion.      Cervical back: Rigidity present.   Skin:     General: Skin is warm and dry.   Neurological:      General: No focal deficit present.      Mental Status: He is alert and oriented to person, place, and time.   Psychiatric:         Mood and Affect: Mood normal.         Behavior: Behavior normal.          PAT AIRWAY:   Airway:     Mallampati::  Unable to assess    TM distance::  <3 FB    Neck ROM::  Limited      Visit Vitals  /81   Pulse 63   Temp 35.6 °C (96 °F) Comment: underarm       DASI Risk Score      Flowsheet Row Most Recent Value   DASI SCORE 45.7   METS Score (Will be calculated only when all the questions are answered) 8.4          Caprini DVT Assessment      Flowsheet Row Most Recent Value   DVT Score 10   Current Status Major surgery planned, lasting over 3 hours   History Prior major surgery, Previous malignancy   Age 40-59 years   BMI 30 or less          Modified Frailty Index      Flowsheet  Row Most Recent Value   Modified Frailty Index Calculator 0          CHADS2 Stroke Risk         N/A 3 - 100%: High Risk   2 - 3%: Medium Risk   0 - 2%: Low Risk     Last Change: N/A          This score determines the patient's risk of having a stroke if the patient has atrial fibrillation.        This score is not applicable to this patient. Components are not calculated.          Revised Cardiac Risk Index      Flowsheet Row Most Recent Value   Revised Cardiac Risk Calculator 0          Apfel Simplified Score      Flowsheet Row Most Recent Value   Apfel Simplified Score Calculator 2          Risk Analysis Index Results This Encounter    No data found in the last 1 encounters.       Stop Bang Score      Flowsheet Row Most Recent Value   Do you snore loudly? 0   Do you often feel tired or fatigued after your sleep? 0   Has anyone ever observed you stop breathing in your sleep? 0   Do you have or are you being treated for high blood pressure? 0   Recent BMI (Calculated) 23.7   Is BMI greater than 35 kg/m2? 0=No   Age older than 50 years old? 1=Yes   Is your neck circumference greater than 17 inches (Male) or 16 inches (Female)? 0   Gender - Male 1=Yes   STOP-BANG Total Score 2          Recent Results (from the past 168 hour(s))   CBC    Collection Time: 02/21/24 11:19 AM   Result Value Ref Range    WBC 4.6 4.4 - 11.3 x10*3/uL    nRBC 0.0 0.0 - 0.0 /100 WBCs    RBC 4.86 4.50 - 5.90 x10*6/uL    Hemoglobin 16.0 13.5 - 17.5 g/dL    Hematocrit 48.4 41.0 - 52.0 %     80 - 100 fL    MCH 32.9 26.0 - 34.0 pg    MCHC 33.1 32.0 - 36.0 g/dL    RDW 11.9 11.5 - 14.5 %    Platelets 286 150 - 450 x10*3/uL   Basic Metabolic Panel    Collection Time: 02/21/24 11:19 AM   Result Value Ref Range    Glucose 71 (L) 74 - 99 mg/dL    Sodium 143 136 - 145 mmol/L    Potassium 4.0 3.5 - 5.3 mmol/L    Chloride 104 98 - 107 mmol/L    Bicarbonate 30 21 - 32 mmol/L    Anion Gap 13 10 - 20 mmol/L    Urea Nitrogen 19 6 - 23 mg/dL    Creatinine  0.64 0.50 - 1.30 mg/dL    eGFR >90 >60 mL/min/1.73m*2    Calcium 9.8 8.6 - 10.6 mg/dL   Coagulation Screen    Collection Time: 02/21/24 11:19 AM   Result Value Ref Range    Protime 11.1 9.8 - 12.8 seconds    INR 1.0 0.9 - 1.1    aPTT 36 27 - 38 seconds   Type And Screen    Collection Time: 02/21/24 11:19 AM   Result Value Ref Range    ABO TYPE O     Rh TYPE POS     ANTIBODY SCREEN NEG    Staphylococcus aureus/MRSA colonization, Culture    Collection Time: 02/21/24 11:19 AM    Specimen: Anterior Nares; Swab   Result Value Ref Range    Staph/MRSA Screen Culture No Staphylococcus aureus isolated         Diagnostic Results        Assessment and Plan:     Anesthesia:  The patient denies problems with anesthesia in the past such as PONV, prolonged sedation, awareness, dental damage, aspiration, cardiac arrest, difficult intubation, or unexpected hospital admissions.     Neuro:   The patient depression controlled on medication. No grossly apparent perioperative risk. Handouts for preoperative brain exercises given to patient.    HEENT/Airway  The patient has diagnoses, significant findings on chart review, clinical presentation or evaluation of small mouth opening, limited neck extension, prior radiation, face, neck. He has history of oral cancer s/p surgery and adjuvant treatment. 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 Scheduled for surgery with Dr. David/Dr. Cedeño 2/28/24.    Cardiovascular  The patient is scheduled for non-cardiac surgery associated with elevated risk.  The patient has no major cardiac contraindications to non- cardiac surgery.  RCRI  The patient meets 0-1 RCRI criteria and therefore has a less than 1% risk of major adverse cardiac complications.  METS  The patient's functional capacity capacity is greater than 4 METS.  Heart Failure  The patient has no known history of heart failure.  Additionally, the patient reports no symptoms of heart failure and demonstrates no signs of heart  failure.  Hypertension Evaluation  The patient has no known history of hypertension and has a normal blood pressure today.  Heart Rhythm Evaluation  The patient has no history of arrhythmias.  Heart Valve Evaluation  The patient has no known history of valvular heart disease. The patient has no symptoms or physical exam findings to suggest valvular heart disease.  CARDS EVAL  The patient is not followed by cardiology.  The patient has a 30-day risk for MACE of 0 predictors, 3.9% risk for cardiac death, nonfatal myocardial infarction, and nonfatal cardiac arrest.  ARTI score which indicates a 0.1% risk of intraoperative or 30-day postoperative.    Pulmonary   No significant findings on chart review or clinical presentation and evaluation. The patient is at increased risk of perioperative pulmonary complications secondary to neck surgery.  The patient has a stop bang score of 2, which places patient at low risk for having PAU.    ARISCAT 26, Intermediate, 13.3% risk of in-hospital postoperative pulmonary complications  PRODIGY 11, intermediate risk of respiratory depression episode.    Hematology  No diagnoses or significant findings on chart review or clinical presentation and evaluation.  Antiplatelet management   The patient is not currently receiving antiplatelet therapy.  Anticoagulation management  The patient is not currently receiving anticoagulation therapy. Patient provided with DVT educational handout.    Caprini score 10, high risk of perioperative VTE  Transfusion Evaluation  A type and screen was obtained given the likelihood for perioperative transfusion of blood or blood products.    Gastrointestinal  The patient has diagnoses or significant findings on chart review or clinical presentation and evaluation significant for  PEG dependent, follows with Purnima RUIZ, last seen 2/13/24.     Genitourinary  No diagnoses or significant findings on chart review or clinical presentation and  evaluation.    Renal  The patient has no known history of chronic kidney disease. No renal diagnoses or significant findings on chart review or clinical presentation and evaluation. The patient has specific risk factors associated with increased risk of perioperative renal complications due to male gender. Preventative measures include preoperative hydration.    Musculoskeletal  The patient has diagnoses or significant findings on chart review or clinical presentation and evaluation significant for left shoulder pain, followed by ortho. Avoid NSAIDS 7 days before surgery.    Endocrine  Diabetes Evaluation  The patient has no history of diabetes mellitus  Thyroid Disease Evaluation  The patient has no history of thyroid disease.    ID  No diagnoses or significant findings on chart review or clinical presentation and evaluation., MRSA screening obtained. Prescriptions given for Hibiclens and Peridex.    -Preoperative medication instructions were provided and reviewed with the patient.  Any additional testing or evaluation was explained to the patient.  NPO Instructions were discussed, and the patient's questions were answered prior to conclusion of this encounter

## 2024-02-21 NOTE — H&P (VIEW-ONLY)
CPM/PAT Evaluation       Name: Chris Fitzgerald (Chris Fitzgerald)  /Age: 1970/53 y.o.     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 He has not had any sign of recurrence since that time. He has been having more oral cavity pain and exposed mandibular hardware was identified. He had some biopsies performed by Dr. Pandya that were negative for cancer. He is PEG dependent. He presents today for perioperative evaluation in anticipation of direct laryngoscopy, creation tracheostomy, hand carpal scaphoid bone graft/int fix, lower extremity free flap on 24 with Dr. David/Dr. Cedeño.     Past Medical History:   Diagnosis Date    Acute back pain     Depression     Dysphagia     Edentulous     Gastrostomy tube dependent (CMS/HCC)     Dietician  Purnima Banegas at [facility]    History of malignant neoplasm of oral cavity     treated with surgery followed by adjuvant treatment back in .    History of radiation therapy     Shoulder pain     Vision loss     wears glasses       Past Surgical History:   Procedure Laterality Date    COLONOSCOPY      GASTROSTOMY TUBE PLACEMENT  2023    HERNIA REPAIR      MODIFIED RADICAL NECK DISSECTION         Patient  reports being sexually active.    Family History   Problem Relation Name Age of Onset    Stroke Mother      Kidney disease Mother      Hypertension Mother      Heart disease Mother      Breast cancer Mother      Heart disease Father         No Known Allergies    Prior to Admission medications    Medication Sig Start Date End Date Taking? Authorizing Provider   chlorhexidine (Peridex) 0.12 % solution Use 10 mL in the mouth or throat 3 times a day. 3/15/23   Historical Provider, MD   Vitamin D3 50 mcg (2,000 unit) tablet Take 1 tablet (2,000 Units) by mouth once daily. 23   Historical Provider, MD         PAT ROS:   Constitutional:   neg    Neuro/Psych:   neg    Eyes:   neg     use of corrective lenses  Ears:   Nose:   neg    Mouth:   neg    Throat:   neg     dysphagia  Neck:   neg    Cardio:   neg    Respiratory:   neg    Endocrine:   neg    GI:    PEG  neg    :   neg    Musculoskeletal:   neg    Hematologic:   neg    Skin:  neg        Physical Exam  Vitals reviewed.   Constitutional:       Appearance: Normal appearance.   HENT:      Head: Normocephalic and atraumatic.      Nose: Nose normal.      Mouth/Throat:      Mouth: Mucous membranes are moist.      Pharynx: Oropharynx is clear.   Eyes:      Extraocular Movements: Extraocular movements intact.      Pupils: Pupils are equal, round, and reactive to light.   Cardiovascular:      Rate and Rhythm: Normal rate and regular rhythm.      Pulses: Normal pulses.      Heart sounds: Normal heart sounds.   Pulmonary:      Effort: Pulmonary effort is normal.      Breath sounds: Normal breath sounds.   Musculoskeletal:         General: Normal range of motion.      Cervical back: Rigidity present.   Skin:     General: Skin is warm and dry.   Neurological:      General: No focal deficit present.      Mental Status: He is alert and oriented to person, place, and time.   Psychiatric:         Mood and Affect: Mood normal.         Behavior: Behavior normal.          PAT AIRWAY:   Airway:     Mallampati::  Unable to assess    TM distance::  <3 FB    Neck ROM::  Limited      Visit Vitals  /81   Pulse 63   Temp 35.6 °C (96 °F) Comment: underarm       DASI Risk Score      Flowsheet Row Most Recent Value   DASI SCORE 45.7   METS Score (Will be calculated only when all the questions are answered) 8.4          Caprini DVT Assessment      Flowsheet Row Most Recent Value   DVT Score 10   Current Status Major surgery planned, lasting over 3 hours   History Prior major surgery, Previous malignancy   Age 40-59 years   BMI 30 or less          Modified Frailty Index      Flowsheet  Row Most Recent Value   Modified Frailty Index Calculator 0          CHADS2 Stroke Risk         N/A 3 - 100%: High Risk   2 - 3%: Medium Risk   0 - 2%: Low Risk     Last Change: N/A          This score determines the patient's risk of having a stroke if the patient has atrial fibrillation.        This score is not applicable to this patient. Components are not calculated.          Revised Cardiac Risk Index      Flowsheet Row Most Recent Value   Revised Cardiac Risk Calculator 0          Apfel Simplified Score      Flowsheet Row Most Recent Value   Apfel Simplified Score Calculator 2          Risk Analysis Index Results This Encounter    No data found in the last 1 encounters.       Stop Bang Score      Flowsheet Row Most Recent Value   Do you snore loudly? 0   Do you often feel tired or fatigued after your sleep? 0   Has anyone ever observed you stop breathing in your sleep? 0   Do you have or are you being treated for high blood pressure? 0   Recent BMI (Calculated) 23.7   Is BMI greater than 35 kg/m2? 0=No   Age older than 50 years old? 1=Yes   Is your neck circumference greater than 17 inches (Male) or 16 inches (Female)? 0   Gender - Male 1=Yes   STOP-BANG Total Score 2          Recent Results (from the past 168 hour(s))   CBC    Collection Time: 02/21/24 11:19 AM   Result Value Ref Range    WBC 4.6 4.4 - 11.3 x10*3/uL    nRBC 0.0 0.0 - 0.0 /100 WBCs    RBC 4.86 4.50 - 5.90 x10*6/uL    Hemoglobin 16.0 13.5 - 17.5 g/dL    Hematocrit 48.4 41.0 - 52.0 %     80 - 100 fL    MCH 32.9 26.0 - 34.0 pg    MCHC 33.1 32.0 - 36.0 g/dL    RDW 11.9 11.5 - 14.5 %    Platelets 286 150 - 450 x10*3/uL   Basic Metabolic Panel    Collection Time: 02/21/24 11:19 AM   Result Value Ref Range    Glucose 71 (L) 74 - 99 mg/dL    Sodium 143 136 - 145 mmol/L    Potassium 4.0 3.5 - 5.3 mmol/L    Chloride 104 98 - 107 mmol/L    Bicarbonate 30 21 - 32 mmol/L    Anion Gap 13 10 - 20 mmol/L    Urea Nitrogen 19 6 - 23 mg/dL    Creatinine  0.64 0.50 - 1.30 mg/dL    eGFR >90 >60 mL/min/1.73m*2    Calcium 9.8 8.6 - 10.6 mg/dL   Coagulation Screen    Collection Time: 02/21/24 11:19 AM   Result Value Ref Range    Protime 11.1 9.8 - 12.8 seconds    INR 1.0 0.9 - 1.1    aPTT 36 27 - 38 seconds   Type And Screen    Collection Time: 02/21/24 11:19 AM   Result Value Ref Range    ABO TYPE O     Rh TYPE POS     ANTIBODY SCREEN NEG    Staphylococcus aureus/MRSA colonization, Culture    Collection Time: 02/21/24 11:19 AM    Specimen: Anterior Nares; Swab   Result Value Ref Range    Staph/MRSA Screen Culture No Staphylococcus aureus isolated         Diagnostic Results        Assessment and Plan:     Anesthesia:  The patient denies problems with anesthesia in the past such as PONV, prolonged sedation, awareness, dental damage, aspiration, cardiac arrest, difficult intubation, or unexpected hospital admissions.     Neuro:   The patient depression controlled on medication. No grossly apparent perioperative risk. Handouts for preoperative brain exercises given to patient.    HEENT/Airway  The patient has diagnoses, significant findings on chart review, clinical presentation or evaluation of small mouth opening, limited neck extension, prior radiation, face, neck. He has history of oral cancer s/p surgery and adjuvant treatment. 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 Scheduled for surgery with Dr. David/Dr. Cedeño 2/28/24.    Cardiovascular  The patient is scheduled for non-cardiac surgery associated with elevated risk.  The patient has no major cardiac contraindications to non- cardiac surgery.  RCRI  The patient meets 0-1 RCRI criteria and therefore has a less than 1% risk of major adverse cardiac complications.  METS  The patient's functional capacity capacity is greater than 4 METS.  Heart Failure  The patient has no known history of heart failure.  Additionally, the patient reports no symptoms of heart failure and demonstrates no signs of heart  failure.  Hypertension Evaluation  The patient has no known history of hypertension and has a normal blood pressure today.  Heart Rhythm Evaluation  The patient has no history of arrhythmias.  Heart Valve Evaluation  The patient has no known history of valvular heart disease. The patient has no symptoms or physical exam findings to suggest valvular heart disease.  CARDS EVAL  The patient is not followed by cardiology.  The patient has a 30-day risk for MACE of 0 predictors, 3.9% risk for cardiac death, nonfatal myocardial infarction, and nonfatal cardiac arrest.  ARTI score which indicates a 0.1% risk of intraoperative or 30-day postoperative.    Pulmonary   No significant findings on chart review or clinical presentation and evaluation. The patient is at increased risk of perioperative pulmonary complications secondary to neck surgery.  The patient has a stop bang score of 2, which places patient at low risk for having PAU.    ARISCAT 26, Intermediate, 13.3% risk of in-hospital postoperative pulmonary complications  PRODIGY 11, intermediate risk of respiratory depression episode.    Hematology  No diagnoses or significant findings on chart review or clinical presentation and evaluation.  Antiplatelet management   The patient is not currently receiving antiplatelet therapy.  Anticoagulation management  The patient is not currently receiving anticoagulation therapy. Patient provided with DVT educational handout.    Caprini score 10, high risk of perioperative VTE  Transfusion Evaluation  A type and screen was obtained given the likelihood for perioperative transfusion of blood or blood products.    Gastrointestinal  The patient has diagnoses or significant findings on chart review or clinical presentation and evaluation significant for  PEG dependent, follows with Purnima RUIZ, last seen 2/13/24.     Genitourinary  No diagnoses or significant findings on chart review or clinical presentation and  evaluation.    Renal  The patient has no known history of chronic kidney disease. No renal diagnoses or significant findings on chart review or clinical presentation and evaluation. The patient has specific risk factors associated with increased risk of perioperative renal complications due to male gender. Preventative measures include preoperative hydration.    Musculoskeletal  The patient has diagnoses or significant findings on chart review or clinical presentation and evaluation significant for left shoulder pain, followed by ortho. Avoid NSAIDS 7 days before surgery.    Endocrine  Diabetes Evaluation  The patient has no history of diabetes mellitus  Thyroid Disease Evaluation  The patient has no history of thyroid disease.    ID  No diagnoses or significant findings on chart review or clinical presentation and evaluation., MRSA screening obtained. Prescriptions given for Hibiclens and Peridex.    -Preoperative medication instructions were provided and reviewed with the patient.  Any additional testing or evaluation was explained to the patient.  NPO Instructions were discussed, and the patient's questions were answered prior to conclusion of this encounter

## 2024-02-21 NOTE — ASSESSMENT & PLAN NOTE
Mandibular plate exposed   Recommend excision  This will require surgical resection with plate removal with possible free tissue transfer/free flap reconstruction for soft tissue coverage.  He is right handed with good Devon's test.  Recommend LRRR or LALT.  Discussed diagnosis, surgical recommendations including extent of the procedures and need for trach were extensively reviewed.  He already has a PEG.  Discussed the risks and benefits of surgery as well as alternative treatment options.  Discussed perioperative expectations regarding hospital stay and discharge.  I did not provide any guarantees regarding outcomes but I do feel that with  reconstruction the patient will have the best chance to heal.  Oral competence will be hard to regain but at least he will improve from where he is now.  However the timing to when tracheotomy/PEG will be removed (or if PEG will ever be able to be removed) or when speech/swallowing will return and degree of any longterm impairment will not be able to be determined until after surgery.

## 2024-02-21 NOTE — PREPROCEDURE INSTRUCTIONS
NPO Instructions:    Do not eat any food after midnight the night before your surgery/procedure.  You may have up to TEN ounces of clear liquids until TWO hours before your instructed arrival time to the hospital. This includes water, black tea/coffee, (no milk or cream), apple juice, and/or electrolyte drinks (Gatorade).  You may chew gum up to TWO hours before your surgery/procedure.    Additional Instructions:     We have sent a prescription for Hibiclens soap and Peridex mouth wash to your preferred pharmacy.  If you have not already, Please  your prescription and start using as directed before surgery.  Follow the instruction sheet provided to you at your CPM/PAT appointment.    Avoid herbal supplements, multivitamins and NSAIDS (non-steroidal anti-inflammatory drugs) such as Advil, Aleve, Ibuprofen, Naproxen, Excedrin, Meloxicam or Celebrex for at least 7 days prior to surgery. May take Tylenol as needed.    Avoid tobacco and alcohol products for 24 hours prior to surgery.    Seven/Six Days before Surgery:  Review your medication instructions, stop indicated medications    Day of Surgery:  Review your medication instructions, take indicated medications  Wear comfortable loose fitting clothing  Do not use moisturizers, creams, lotions or perfume  All jewelry and valuables should be left at home    Kali Simpson [facility]  [facility]  Ltouu-[phone]  [phone]  [email or web address]    [facility]  [phone]

## 2024-02-23 LAB — STAPHYLOCOCCUS SPEC CULT: NORMAL

## 2024-02-27 ENCOUNTER — ANESTHESIA EVENT (OUTPATIENT)
Dept: OPERATING ROOM | Facility: HOSPITAL | Age: 54
End: 2024-02-27
Payer: COMMERCIAL

## 2024-02-28 ENCOUNTER — HOSPITAL ENCOUNTER (INPATIENT)
Facility: HOSPITAL | Age: 54
LOS: 1 days | Discharge: HOME | End: 2024-02-29
Attending: OTOLARYNGOLOGY | Admitting: OTOLARYNGOLOGY
Payer: COMMERCIAL

## 2024-02-28 ENCOUNTER — ANESTHESIA (OUTPATIENT)
Dept: OPERATING ROOM | Facility: HOSPITAL | Age: 54
End: 2024-02-28
Payer: COMMERCIAL

## 2024-02-28 DIAGNOSIS — T84.498A EXPOSED ORTHOPAEDIC HARDWARE (CMS-HCC): ICD-10-CM
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
## 2024-02-28 DIAGNOSIS — G89.18 POST-OP PAIN: ICD-10-CM

## 2024-02-28 DIAGNOSIS — Z85.819 HISTORY OF ORAL CANCER: ICD-10-CM

## 2024-02-28 DIAGNOSIS — M27.2 OSTEORADIONECROSIS OF MANDIBLE: Primary | ICD-10-CM

## 2024-02-28 LAB
ABO GROUP (TYPE) IN BLOOD: NORMAL
RH FACTOR (ANTIGEN D): NORMAL

## 2024-02-28 PROCEDURE — 2500000001 HC RX 250 WO HCPCS SELF ADMINISTERED DRUGS (ALT 637 FOR MEDICARE OP): Mod: SE | Performed by: OTOLARYNGOLOGY

## 2024-02-28 PROCEDURE — A4217 STERILE WATER/SALINE, 500 ML: HCPCS | Mod: SE | Performed by: OTOLARYNGOLOGY

## 2024-02-28 PROCEDURE — 7100000001 HC RECOVERY ROOM TIME - INITIAL BASE CHARGE: Performed by: OTOLARYNGOLOGY

## 2024-02-28 PROCEDURE — 3600000008 HC OR TIME - EACH INCREMENTAL 1 MINUTE - PROCEDURE LEVEL THREE: Performed by: OTOLARYNGOLOGY

## 2024-02-28 PROCEDURE — A31525 PR LARYNGOSCOPY,DIRECT,DIAGNOSTIC: Performed by: ANESTHESIOLOGY

## 2024-02-28 PROCEDURE — 2500000001 HC RX 250 WO HCPCS SELF ADMINISTERED DRUGS (ALT 637 FOR MEDICARE OP): Performed by: STUDENT IN AN ORGANIZED HEALTH CARE EDUCATION/TRAINING PROGRAM

## 2024-02-28 PROCEDURE — 1170000001 HC PRIVATE ONCOLOGY ROOM DAILY

## 2024-02-28 PROCEDURE — A4312 CATH W/O BAG 2-WAY SILICONE: HCPCS | Performed by: OTOLARYNGOLOGY

## 2024-02-28 PROCEDURE — 36415 COLL VENOUS BLD VENIPUNCTURE: CPT | Performed by: ANESTHESIOLOGY

## 2024-02-28 PROCEDURE — A31525 PR LARYNGOSCOPY,DIRECT,DIAGNOSTIC

## 2024-02-28 PROCEDURE — 3600000003 HC OR TIME - INITIAL BASE CHARGE - PROCEDURE LEVEL THREE: Performed by: OTOLARYNGOLOGY

## 2024-02-28 PROCEDURE — 2500000004 HC RX 250 GENERAL PHARMACY W/ HCPCS (ALT 636 FOR OP/ED): Performed by: STUDENT IN AN ORGANIZED HEALTH CARE EDUCATION/TRAINING PROGRAM

## 2024-02-28 PROCEDURE — 2500000004 HC RX 250 GENERAL PHARMACY W/ HCPCS (ALT 636 FOR OP/ED): Mod: SE | Performed by: OTOLARYNGOLOGY

## 2024-02-28 PROCEDURE — 3700000001 HC GENERAL ANESTHESIA TIME - INITIAL BASE CHARGE: Performed by: OTOLARYNGOLOGY

## 2024-02-28 PROCEDURE — 2780000003 HC OR 278 NO HCPCS: Performed by: OTOLARYNGOLOGY

## 2024-02-28 PROCEDURE — 2720000007 HC OR 272 NO HCPCS: Performed by: OTOLARYNGOLOGY

## 2024-02-28 PROCEDURE — 7100000002 HC RECOVERY ROOM TIME - EACH INCREMENTAL 1 MINUTE: Performed by: OTOLARYNGOLOGY

## 2024-02-28 PROCEDURE — 36620 INSERTION CATHETER ARTERY: CPT | Performed by: ANESTHESIOLOGY

## 2024-02-28 PROCEDURE — RXMED WILLOW AMBULATORY MEDICATION CHARGE

## 2024-02-28 PROCEDURE — 0NPW04Z REMOVAL OF INTERNAL FIXATION DEVICE FROM FACIAL BONE, OPEN APPROACH: ICD-10-PCS | Performed by: OTOLARYNGOLOGY

## 2024-02-28 PROCEDURE — 2500000004 HC RX 250 GENERAL PHARMACY W/ HCPCS (ALT 636 FOR OP/ED): Mod: SE | Performed by: ANESTHESIOLOGY

## 2024-02-28 PROCEDURE — 2500000004 HC RX 250 GENERAL PHARMACY W/ HCPCS (ALT 636 FOR OP/ED): Mod: SE

## 2024-02-28 PROCEDURE — 2500000005 HC RX 250 GENERAL PHARMACY W/O HCPCS: Mod: SE

## 2024-02-28 PROCEDURE — 2500000005 HC RX 250 GENERAL PHARMACY W/O HCPCS: Mod: SE | Performed by: ANESTHESIOLOGY

## 2024-02-28 PROCEDURE — 0CJY8ZZ INSPECTION OF MOUTH AND THROAT, VIA NATURAL OR ARTIFICIAL OPENING ENDOSCOPIC: ICD-10-PCS | Performed by: OTOLARYNGOLOGY

## 2024-02-28 PROCEDURE — 20680 REMOVAL OF IMPLANT DEEP: CPT | Performed by: OTOLARYNGOLOGY

## 2024-02-28 PROCEDURE — 76937 US GUIDE VASCULAR ACCESS: CPT | Performed by: ANESTHESIOLOGY

## 2024-02-28 PROCEDURE — 31525 DX LARYNGOSCOPY EXCL NB: CPT | Performed by: OTOLARYNGOLOGY

## 2024-02-28 PROCEDURE — 3700000002 HC GENERAL ANESTHESIA TIME - EACH INCREMENTAL 1 MINUTE: Performed by: OTOLARYNGOLOGY

## 2024-02-28 RX ORDER — ROCURONIUM BROMIDE 10 MG/ML
INJECTION, SOLUTION INTRAVENOUS AS NEEDED
Status: DISCONTINUED | OUTPATIENT
Start: 2024-02-28 | End: 2024-02-28

## 2024-02-28 RX ORDER — ACETAMINOPHEN 160 MG/5ML
1000 SOLUTION ORAL EVERY 8 HOURS SCHEDULED
Status: DISCONTINUED | OUTPATIENT
Start: 2024-02-28 | End: 2024-02-29 | Stop reason: HOSPADM

## 2024-02-28 RX ORDER — NALOXONE HYDROCHLORIDE 0.4 MG/ML
0.2 INJECTION, SOLUTION INTRAMUSCULAR; INTRAVENOUS; SUBCUTANEOUS EVERY 5 MIN PRN
Status: DISCONTINUED | OUTPATIENT
Start: 2024-02-28 | End: 2024-02-29 | Stop reason: HOSPADM

## 2024-02-28 RX ORDER — SODIUM CHLORIDE 9 MG/ML
75 INJECTION, SOLUTION INTRAVENOUS CONTINUOUS
Status: DISCONTINUED | OUTPATIENT
Start: 2024-02-28 | End: 2024-02-28

## 2024-02-28 RX ORDER — LIDOCAINE HYDROCHLORIDE 10 MG/ML
0.1 INJECTION INFILTRATION; PERINEURAL ONCE
Status: DISCONTINUED | OUTPATIENT
Start: 2024-02-28 | End: 2024-02-28 | Stop reason: HOSPADM

## 2024-02-28 RX ORDER — PHENYLEPHRINE HCL IN 0.9% NACL 0.4MG/10ML
SYRINGE (ML) INTRAVENOUS AS NEEDED
Status: DISCONTINUED | OUTPATIENT
Start: 2024-02-28 | End: 2024-02-28

## 2024-02-28 RX ORDER — ATORVASTATIN CALCIUM 40 MG/1
40 TABLET, FILM COATED ORAL DAILY
Status: DISCONTINUED | OUTPATIENT
Start: 2024-02-28 | End: 2024-02-29 | Stop reason: HOSPADM

## 2024-02-28 RX ORDER — OMEGA-3S/DHA/EPA/FISH OIL/D3 300MG-1000
2000 CAPSULE ORAL DAILY
Start: 2024-02-28

## 2024-02-28 RX ORDER — CHOLECALCIFEROL (VITAMIN D3) 25 MCG
2000 TABLET ORAL DAILY
Status: DISCONTINUED | OUTPATIENT
Start: 2024-02-28 | End: 2024-02-29 | Stop reason: HOSPADM

## 2024-02-28 RX ORDER — MIDAZOLAM HYDROCHLORIDE 1 MG/ML
INJECTION INTRAMUSCULAR; INTRAVENOUS AS NEEDED
Status: DISCONTINUED | OUTPATIENT
Start: 2024-02-28 | End: 2024-02-28

## 2024-02-28 RX ORDER — ESMOLOL HYDROCHLORIDE 10 MG/ML
INJECTION INTRAVENOUS AS NEEDED
Status: DISCONTINUED | OUTPATIENT
Start: 2024-02-28 | End: 2024-02-28

## 2024-02-28 RX ORDER — REMIFENTANIL HYDROCHLORIDE 1 MG/ML
INJECTION, POWDER, LYOPHILIZED, FOR SOLUTION INTRAVENOUS CONTINUOUS PRN
Status: DISCONTINUED | OUTPATIENT
Start: 2024-02-28 | End: 2024-02-28

## 2024-02-28 RX ORDER — ONDANSETRON HYDROCHLORIDE 2 MG/ML
4 INJECTION, SOLUTION INTRAVENOUS ONCE AS NEEDED
Status: DISCONTINUED | OUTPATIENT
Start: 2024-02-28 | End: 2024-02-28 | Stop reason: HOSPADM

## 2024-02-28 RX ORDER — ONDANSETRON HYDROCHLORIDE 2 MG/ML
INJECTION, SOLUTION INTRAVENOUS AS NEEDED
Status: DISCONTINUED | OUTPATIENT
Start: 2024-02-28 | End: 2024-02-28

## 2024-02-28 RX ORDER — DEXAMETHASONE SODIUM PHOSPHATE 100 MG/10ML
INJECTION INTRAMUSCULAR; INTRAVENOUS AS NEEDED
Status: DISCONTINUED | OUTPATIENT
Start: 2024-02-28 | End: 2024-02-28

## 2024-02-28 RX ORDER — WATER 1 ML/ML
IRRIGANT IRRIGATION AS NEEDED
Status: DISCONTINUED | OUTPATIENT
Start: 2024-02-28 | End: 2024-02-28 | Stop reason: HOSPADM

## 2024-02-28 RX ORDER — SODIUM CHLORIDE 0.9 G/100ML
IRRIGANT IRRIGATION AS NEEDED
Status: DISCONTINUED | OUTPATIENT
Start: 2024-02-28 | End: 2024-02-28 | Stop reason: HOSPADM

## 2024-02-28 RX ORDER — TRAMADOL HYDROCHLORIDE 50 MG/1
50 TABLET ORAL EVERY 8 HOURS PRN
Qty: 15 TABLET | Refills: 0 | Status: SHIPPED | OUTPATIENT
Start: 2024-02-28 | End: 2024-03-05

## 2024-02-28 RX ORDER — ENOXAPARIN SODIUM 100 MG/ML
40 INJECTION SUBCUTANEOUS ONCE
Status: COMPLETED | OUTPATIENT
Start: 2024-02-28 | End: 2024-02-28

## 2024-02-28 RX ORDER — LABETALOL HYDROCHLORIDE 5 MG/ML
INJECTION, SOLUTION INTRAVENOUS AS NEEDED
Status: DISCONTINUED | OUTPATIENT
Start: 2024-02-28 | End: 2024-02-28

## 2024-02-28 RX ORDER — HYDRALAZINE HYDROCHLORIDE 20 MG/ML
5 INJECTION INTRAMUSCULAR; INTRAVENOUS EVERY 30 MIN PRN
Status: DISCONTINUED | OUTPATIENT
Start: 2024-02-28 | End: 2024-02-28 | Stop reason: HOSPADM

## 2024-02-28 RX ORDER — BACITRACIN 500 [USP'U]/G
OINTMENT TOPICAL AS NEEDED
Status: DISCONTINUED | OUTPATIENT
Start: 2024-02-28 | End: 2024-02-28 | Stop reason: HOSPADM

## 2024-02-28 RX ORDER — SODIUM CHLORIDE, SODIUM LACTATE, POTASSIUM CHLORIDE, CALCIUM CHLORIDE 600; 310; 30; 20 MG/100ML; MG/100ML; MG/100ML; MG/100ML
INJECTION, SOLUTION INTRAVENOUS CONTINUOUS PRN
Status: DISCONTINUED | OUTPATIENT
Start: 2024-02-28 | End: 2024-02-28

## 2024-02-28 RX ORDER — ONDANSETRON HYDROCHLORIDE 2 MG/ML
4 INJECTION, SOLUTION INTRAVENOUS EVERY 8 HOURS PRN
Status: DISCONTINUED | OUTPATIENT
Start: 2024-02-28 | End: 2024-02-29 | Stop reason: HOSPADM

## 2024-02-28 RX ORDER — SODIUM CHLORIDE, SODIUM LACTATE, POTASSIUM CHLORIDE, CALCIUM CHLORIDE 600; 310; 30; 20 MG/100ML; MG/100ML; MG/100ML; MG/100ML
50 INJECTION, SOLUTION INTRAVENOUS CONTINUOUS
Status: DISCONTINUED | OUTPATIENT
Start: 2024-02-28 | End: 2024-02-28 | Stop reason: HOSPADM

## 2024-02-28 RX ORDER — AMOXICILLIN 250 MG
1 CAPSULE ORAL DAILY
Qty: 5 TABLET | Refills: 0 | Status: SHIPPED | OUTPATIENT
Start: 2024-02-28 | End: 2024-03-05

## 2024-02-28 RX ORDER — OXYCODONE HCL 5 MG/5 ML
5 SOLUTION, ORAL ORAL EVERY 4 HOURS PRN
Status: DISCONTINUED | OUTPATIENT
Start: 2024-02-28 | End: 2024-02-29 | Stop reason: HOSPADM

## 2024-02-28 RX ORDER — ATORVASTATIN CALCIUM 40 MG/1
40 TABLET, FILM COATED ORAL DAILY
Status: ON HOLD | COMMUNITY
End: 2024-02-28 | Stop reason: SDUPTHER

## 2024-02-28 RX ORDER — PROPOFOL 10 MG/ML
INJECTION, EMULSION INTRAVENOUS AS NEEDED
Status: DISCONTINUED | OUTPATIENT
Start: 2024-02-28 | End: 2024-02-28

## 2024-02-28 RX ORDER — ATORVASTATIN CALCIUM 40 MG/1
40 TABLET, FILM COATED ORAL DAILY
Start: 2024-02-28

## 2024-02-28 RX ORDER — ENOXAPARIN SODIUM 100 MG/ML
40 INJECTION SUBCUTANEOUS EVERY 24 HOURS
Status: DISCONTINUED | OUTPATIENT
Start: 2024-02-29 | End: 2024-02-29 | Stop reason: HOSPADM

## 2024-02-28 RX ORDER — HYDROMORPHONE HYDROCHLORIDE 1 MG/ML
0.2 INJECTION, SOLUTION INTRAMUSCULAR; INTRAVENOUS; SUBCUTANEOUS EVERY 5 MIN PRN
Status: COMPLETED | OUTPATIENT
Start: 2024-02-28 | End: 2024-02-28

## 2024-02-28 RX ORDER — AMPICILLIN AND SULBACTAM 2; 1 G/1; G/1
INJECTION, POWDER, FOR SOLUTION INTRAMUSCULAR; INTRAVENOUS AS NEEDED
Status: DISCONTINUED | OUTPATIENT
Start: 2024-02-28 | End: 2024-02-28

## 2024-02-28 RX ORDER — LIDOCAINE HYDROCHLORIDE 40 MG/ML
INJECTION, SOLUTION RETROBULBAR AS NEEDED
Status: DISCONTINUED | OUTPATIENT
Start: 2024-02-28 | End: 2024-02-28

## 2024-02-28 RX ORDER — TRAMADOL HYDROCHLORIDE 50 MG/1
50 TABLET ORAL EVERY 6 HOURS PRN
Status: DISCONTINUED | OUTPATIENT
Start: 2024-02-28 | End: 2024-02-29 | Stop reason: HOSPADM

## 2024-02-28 RX ORDER — AMOXICILLIN 250 MG
2 CAPSULE ORAL 2 TIMES DAILY
Status: DISCONTINUED | OUTPATIENT
Start: 2024-02-28 | End: 2024-02-29 | Stop reason: HOSPADM

## 2024-02-28 RX ORDER — PHENYLEPHRINE 10 MG/250 ML(40 MCG/ML)IN 0.9 % SOD.CHLORIDE INTRAVENOUS
CONTINUOUS PRN
Status: DISCONTINUED | OUTPATIENT
Start: 2024-02-28 | End: 2024-02-28

## 2024-02-28 RX ORDER — GLYCOPYRROLATE 0.2 MG/ML
INJECTION INTRAMUSCULAR; INTRAVENOUS AS NEEDED
Status: DISCONTINUED | OUTPATIENT
Start: 2024-02-28 | End: 2024-02-28

## 2024-02-28 RX ORDER — AMOXICILLIN AND CLAVULANATE POTASSIUM 875; 125 MG/1; MG/1
1 TABLET, FILM COATED ORAL 2 TIMES DAILY
Qty: 26 TABLET | Refills: 0 | Status: SHIPPED | OUTPATIENT
Start: 2024-02-28 | End: 2024-03-13

## 2024-02-28 RX ORDER — FENTANYL CITRATE 50 UG/ML
INJECTION, SOLUTION INTRAMUSCULAR; INTRAVENOUS AS NEEDED
Status: DISCONTINUED | OUTPATIENT
Start: 2024-02-28 | End: 2024-02-28

## 2024-02-28 RX ORDER — HYDROMORPHONE HYDROCHLORIDE 1 MG/ML
0.5 INJECTION, SOLUTION INTRAMUSCULAR; INTRAVENOUS; SUBCUTANEOUS EVERY 5 MIN PRN
Status: COMPLETED | OUTPATIENT
Start: 2024-02-28 | End: 2024-02-28

## 2024-02-28 RX ADMIN — Medication 80 MCG: at 09:49

## 2024-02-28 RX ADMIN — HYDROMORPHONE HYDROCHLORIDE 0.5 MG: 1 INJECTION, SOLUTION INTRAMUSCULAR; INTRAVENOUS; SUBCUTANEOUS at 12:02

## 2024-02-28 RX ADMIN — ROCURONIUM BROMIDE 50 MG: 10 INJECTION INTRAVENOUS at 08:41

## 2024-02-28 RX ADMIN — ONDANSETRON 4 MG: 2 INJECTION, SOLUTION INTRAMUSCULAR; INTRAVENOUS at 11:21

## 2024-02-28 RX ADMIN — ACETAMINOPHEN 1000 MG: 650 SOLUTION ORAL at 21:18

## 2024-02-28 RX ADMIN — Medication 80 MCG: at 09:50

## 2024-02-28 RX ADMIN — PROPOFOL 30 MG: 10 INJECTION, EMULSION INTRAVENOUS at 08:43

## 2024-02-28 RX ADMIN — LIDOCAINE HYDROCHLORIDE 5 ML: 40 INJECTION, SOLUTION RETROBULBAR; TOPICAL at 08:38

## 2024-02-28 RX ADMIN — HYDROMORPHONE HYDROCHLORIDE 0.2 MG: 1 INJECTION, SOLUTION INTRAMUSCULAR; INTRAVENOUS; SUBCUTANEOUS at 12:25

## 2024-02-28 RX ADMIN — AMPICILLIN SODIUM AND SULBACTAM SODIUM 3 G: 2; 1 INJECTION, POWDER, FOR SOLUTION INTRAMUSCULAR; INTRAVENOUS at 08:50

## 2024-02-28 RX ADMIN — REMIFENTANIL HYDROCHLORIDE 0.05 MCG/KG/MIN: 1 INJECTION, POWDER, LYOPHILIZED, FOR SOLUTION INTRAVENOUS at 09:32

## 2024-02-28 RX ADMIN — PROPOFOL 50 MG: 10 INJECTION, EMULSION INTRAVENOUS at 08:38

## 2024-02-28 RX ADMIN — HYDROMORPHONE HYDROCHLORIDE 0.5 MG: 1 INJECTION, SOLUTION INTRAMUSCULAR; INTRAVENOUS; SUBCUTANEOUS at 11:57

## 2024-02-28 RX ADMIN — PROPOFOL 50 MG: 10 INJECTION, EMULSION INTRAVENOUS at 08:40

## 2024-02-28 RX ADMIN — Medication 80 MCG: at 10:25

## 2024-02-28 RX ADMIN — Medication 80 MCG: at 09:26

## 2024-02-28 RX ADMIN — HYDRALAZINE HYDROCHLORIDE 5 MG: 20 INJECTION INTRAMUSCULAR; INTRAVENOUS at 13:10

## 2024-02-28 RX ADMIN — Medication 120 MCG: at 10:46

## 2024-02-28 RX ADMIN — HYDROMORPHONE HYDROCHLORIDE 0.2 MG: 1 INJECTION, SOLUTION INTRAMUSCULAR; INTRAVENOUS; SUBCUTANEOUS at 12:13

## 2024-02-28 RX ADMIN — SODIUM CHLORIDE 75 ML/HR: 9 INJECTION, SOLUTION INTRAVENOUS at 16:26

## 2024-02-28 RX ADMIN — SUGAMMADEX 200 MG: 100 INJECTION, SOLUTION INTRAVENOUS at 11:27

## 2024-02-28 RX ADMIN — Medication: at 12:00

## 2024-02-28 RX ADMIN — ACETAMINOPHEN 1000 MG: 650 SOLUTION ORAL at 16:26

## 2024-02-28 RX ADMIN — MIDAZOLAM HYDROCHLORIDE 2 MG: 1 INJECTION, SOLUTION INTRAMUSCULAR; INTRAVENOUS at 08:27

## 2024-02-28 RX ADMIN — ATORVASTATIN CALCIUM 40 MG: 40 TABLET, FILM COATED ORAL at 16:26

## 2024-02-28 RX ADMIN — Medication 80 MCG: at 10:06

## 2024-02-28 RX ADMIN — SODIUM CHLORIDE, POTASSIUM CHLORIDE, SODIUM LACTATE AND CALCIUM CHLORIDE: 600; 310; 30; 20 INJECTION, SOLUTION INTRAVENOUS at 08:27

## 2024-02-28 RX ADMIN — OXYCODONE HYDROCHLORIDE 5 MG: 5 SOLUTION ORAL at 21:20

## 2024-02-28 RX ADMIN — GLYCOPYRROLATE 0.2 MG: 0.2 INJECTION INTRAMUSCULAR; INTRAVENOUS at 10:25

## 2024-02-28 RX ADMIN — HYDROMORPHONE HYDROCHLORIDE 0.2 MG: 1 INJECTION, SOLUTION INTRAMUSCULAR; INTRAVENOUS; SUBCUTANEOUS at 12:19

## 2024-02-28 RX ADMIN — ESMOLOL HYDROCHLORIDE 100 MG: 10 INJECTION, SOLUTION INTRAVENOUS at 08:41

## 2024-02-28 RX ADMIN — Medication 120 MCG: at 09:41

## 2024-02-28 RX ADMIN — ROCURONIUM BROMIDE 20 MG: 10 INJECTION INTRAVENOUS at 09:34

## 2024-02-28 RX ADMIN — LABETALOL HYDROCHLORIDE 10 MG: 5 INJECTION, SOLUTION INTRAVENOUS at 11:44

## 2024-02-28 RX ADMIN — HYDROMORPHONE HYDROCHLORIDE 0.2 MG: 1 INJECTION, SOLUTION INTRAMUSCULAR; INTRAVENOUS; SUBCUTANEOUS at 12:30

## 2024-02-28 RX ADMIN — ENOXAPARIN SODIUM 40 MG: 100 INJECTION SUBCUTANEOUS at 16:27

## 2024-02-28 RX ADMIN — HYDROMORPHONE HYDROCHLORIDE 0.2 MG: 1 INJECTION, SOLUTION INTRAMUSCULAR; INTRAVENOUS; SUBCUTANEOUS at 12:35

## 2024-02-28 RX ADMIN — FENTANYL CITRATE 25 MCG: 50 INJECTION, SOLUTION INTRAMUSCULAR; INTRAVENOUS at 09:34

## 2024-02-28 RX ADMIN — FENTANYL CITRATE 25 MCG: 50 INJECTION, SOLUTION INTRAMUSCULAR; INTRAVENOUS at 09:31

## 2024-02-28 RX ADMIN — ESMOLOL HYDROCHLORIDE 20 MG: 10 INJECTION, SOLUTION INTRAVENOUS at 11:43

## 2024-02-28 RX ADMIN — AMPICILLIN AND SULBACTAM 3 G: 2; 1 INJECTION, POWDER, FOR SOLUTION INTRAVENOUS at 16:27

## 2024-02-28 RX ADMIN — Medication 2000 UNITS: at 16:27

## 2024-02-28 RX ADMIN — Medication 120 MCG: at 09:45

## 2024-02-28 RX ADMIN — Medication 0.3 MCG/KG/MIN: at 10:05

## 2024-02-28 RX ADMIN — OXYCODONE HYDROCHLORIDE 5 MG: 5 SOLUTION ORAL at 16:26

## 2024-02-28 RX ADMIN — SODIUM CHLORIDE, SODIUM LACTATE, POTASSIUM CHLORIDE, CALCIUM CHLORIDE: 600; 310; 30; 20 INJECTION, SOLUTION INTRAVENOUS at 09:05

## 2024-02-28 RX ADMIN — Medication 80 MCG: at 09:43

## 2024-02-28 RX ADMIN — Medication 80 MCG: at 09:39

## 2024-02-28 RX ADMIN — DEXAMETHASONE SODIUM PHOSPHATE 10 MG: 10 INJECTION INTRAMUSCULAR; INTRAVENOUS at 08:50

## 2024-02-28 RX ADMIN — AMPICILLIN AND SULBACTAM 3 G: 2; 1 INJECTION, POWDER, FOR SOLUTION INTRAVENOUS at 21:18

## 2024-02-28 SDOH — SOCIAL STABILITY: SOCIAL INSECURITY: DO YOU FEEL UNSAFE GOING BACK TO THE PLACE WHERE YOU ARE LIVING?: NO

## 2024-02-28 SDOH — SOCIAL STABILITY: SOCIAL INSECURITY: ABUSE: ADULT

## 2024-02-28 SDOH — SOCIAL STABILITY: SOCIAL INSECURITY: HAS ANYONE EVER THREATENED TO HURT YOUR FAMILY OR YOUR PETS?: NO

## 2024-02-28 SDOH — SOCIAL STABILITY: SOCIAL INSECURITY: WERE YOU ABLE TO COMPLETE ALL THE BEHAVIORAL HEALTH SCREENINGS?: YES

## 2024-02-28 SDOH — SOCIAL STABILITY: SOCIAL INSECURITY: HAVE YOU HAD THOUGHTS OF HARMING ANYONE ELSE?: NO

## 2024-02-28 SDOH — SOCIAL STABILITY: SOCIAL INSECURITY: DO YOU FEEL ANYONE HAS EXPLOITED OR TAKEN ADVANTAGE OF YOU FINANCIALLY OR OF YOUR PERSONAL PROPERTY?: NO

## 2024-02-28 SDOH — SOCIAL STABILITY: SOCIAL INSECURITY: DOES ANYONE TRY TO KEEP YOU FROM HAVING/CONTACTING OTHER FRIENDS OR DOING THINGS OUTSIDE YOUR HOME?: NO

## 2024-02-28 SDOH — SOCIAL STABILITY: SOCIAL INSECURITY: ARE THERE ANY APPARENT SIGNS OF INJURIES/BEHAVIORS THAT COULD BE RELATED TO ABUSE/NEGLECT?: NO

## 2024-02-28 SDOH — SOCIAL STABILITY: SOCIAL INSECURITY: ARE YOU OR HAVE YOU BEEN THREATENED OR ABUSED PHYSICALLY, EMOTIONALLY, OR SEXUALLY BY ANYONE?: NO

## 2024-02-28 ASSESSMENT — PAIN - FUNCTIONAL ASSESSMENT
PAIN_FUNCTIONAL_ASSESSMENT: UNABLE TO SELF-REPORT
PAIN_FUNCTIONAL_ASSESSMENT: 0-10
PAIN_FUNCTIONAL_ASSESSMENT: UNABLE TO SELF-REPORT
PAIN_FUNCTIONAL_ASSESSMENT: 0-10
PAIN_FUNCTIONAL_ASSESSMENT: UNABLE TO SELF-REPORT
PAIN_FUNCTIONAL_ASSESSMENT: 0-10
PAIN_FUNCTIONAL_ASSESSMENT: UNABLE TO SELF-REPORT
PAIN_FUNCTIONAL_ASSESSMENT: 0-10

## 2024-02-28 ASSESSMENT — ACTIVITIES OF DAILY LIVING (ADL)
LACK_OF_TRANSPORTATION: NO
FEEDING YOURSELF: INDEPENDENT
PATIENT'S MEMORY ADEQUATE TO SAFELY COMPLETE DAILY ACTIVITIES?: YES
TOILETING: INDEPENDENT
BATHING: INDEPENDENT
LACK_OF_TRANSPORTATION: NO
WALKS IN HOME: INDEPENDENT
DRESSING YOURSELF: INDEPENDENT
HEARING - LEFT EAR: FUNCTIONAL
JUDGMENT_ADEQUATE_SAFELY_COMPLETE_DAILY_ACTIVITIES: YES
GROOMING: INDEPENDENT
ADEQUATE_TO_COMPLETE_ADL: YES
HEARING - RIGHT EAR: FUNCTIONAL

## 2024-02-28 ASSESSMENT — COGNITIVE AND FUNCTIONAL STATUS - GENERAL
MOBILITY SCORE: 24
PATIENT BASELINE BEDBOUND: NO
DAILY ACTIVITIY SCORE: 24

## 2024-02-28 ASSESSMENT — PAIN SCALES - GENERAL
PAINLEVEL_OUTOF10: 5 - MODERATE PAIN
PAINLEVEL_OUTOF10: 5 - MODERATE PAIN
PAINLEVEL_OUTOF10: 6
PAINLEVEL_OUTOF10: 6
PAINLEVEL_OUTOF10: 8
PAINLEVEL_OUTOF10: 8
PAINLEVEL_OUTOF10: 4
PAINLEVEL_OUTOF10: 0 - NO PAIN
PAINLEVEL_OUTOF10: 8
PAINLEVEL_OUTOF10: 6
PAIN_LEVEL: 1
PAINLEVEL_OUTOF10: 6
PAINLEVEL_OUTOF10: 7

## 2024-02-28 ASSESSMENT — PATIENT HEALTH QUESTIONNAIRE - PHQ9
2. FEELING DOWN, DEPRESSED OR HOPELESS: NOT AT ALL
1. LITTLE INTEREST OR PLEASURE IN DOING THINGS: NOT AT ALL
SUM OF ALL RESPONSES TO PHQ9 QUESTIONS 1 & 2: 0

## 2024-02-28 ASSESSMENT — COLUMBIA-SUICIDE SEVERITY RATING SCALE - C-SSRS
6. HAVE YOU EVER DONE ANYTHING, STARTED TO DO ANYTHING, OR PREPARED TO DO ANYTHING TO END YOUR LIFE?: NO
1. IN THE PAST MONTH, HAVE YOU WISHED YOU WERE DEAD OR WISHED YOU COULD GO TO SLEEP AND NOT WAKE UP?: NO
2. HAVE YOU ACTUALLY HAD ANY THOUGHTS OF KILLING YOURSELF?: NO

## 2024-02-28 ASSESSMENT — LIFESTYLE VARIABLES
HOW MANY STANDARD DRINKS CONTAINING ALCOHOL DO YOU HAVE ON A TYPICAL DAY: 1 OR 2
SKIP TO QUESTIONS 9-10: 1
HOW OFTEN DO YOU HAVE A DRINK CONTAINING ALCOHOL: MONTHLY OR LESS
AUDIT-C TOTAL SCORE: 1
AUDIT-C TOTAL SCORE: 1
HOW OFTEN DO YOU HAVE 6 OR MORE DRINKS ON ONE OCCASION: NEVER

## 2024-02-28 NOTE — ANESTHESIA PROCEDURE NOTES
Airway  Date/Time: 2/28/2024 8:45 AM  Urgency: elective      Staffing  Performed: CAA   Authorized by: Deandra Barone MD    Performed by: Deandra Barone MD  Patient location during procedure: OR    Indications and Patient Condition  Indications for airway management: anesthesia  Spontaneous Ventilation: absent  Sedation level: deep  Preoxygenated: yes  Patient position: sniffing  Mask difficulty assessment: 2 - vent by mask + OA or adjuvant +/- NMBA    Final Airway Details  Final airway type: endotracheal airway      Successful airway: ETT  Cuffed: yes   Successful intubation technique: video laryngoscopy  Facilitating devices/methods: intubating stylet and NPA  Endotracheal tube insertion site: oral  Blade size: #3  ETT size (mm): 6.0  Cormack-Lehane Classification: grade I - full view of glottis  Placement verified by: chest auscultation and capnometry   Number of attempts at approach: 1

## 2024-02-28 NOTE — BRIEF OP NOTE
Date: 2024  OR Location: [facility] OR    Name: Chris Fitzgerald, : 1970, Age: 53 y.o., MRN: [id number], Sex: male    Diagnosis  Pre-op Diagnosis     * Exposed orthopaedic hardware (CMS/HCC) [T84.498A] Post-op Diagnosis     * Exposed orthopaedic hardware (CMS/HCC) [T84.498A]     Procedures  Direct Laryngoscopy   Removal of mandibular hardware      Surgeons      * Roderick David - Primary    Resident/Fellow/Other Assistant:  Surgeon(s) and Role:     * Alessandro Harvey MD - Assisting  Abdon Leach DO - Assistant    Procedure Summary  Anesthesia: General  ASA: III  Anesthesia Staff: Anesthesiologist: Deandra Barone MD  CRNA: MARC OA Sams-CRNA  C-AA: BEBA Anderson  SANDEEP: Jaden Seipp  Estimated Blood Loss: 10mL  Intra-op Medications:   Administrations occurring from 0815 to 1615 on 24:   Medication Name Total Dose   sodium chloride 0.9 % irrigation solution 1,000 mL   sterile water irrigation solution 1,000 mL   bacitracin ointment 1 Application   oxygen (O2) therapy Cannot be calculated   HYDROmorphone (Dilaudid) injection 0.5 mg 1 mg              Anesthesia Record               Intraprocedure I/O Totals          Intake    Remifentanil Drip 0.00 mL    The total shown is the total volume documented since Anesthesia Start was filed.    Phenylephrine Drip 0.00 mL    The total shown is the total volume documented since Anesthesia Start was filed.    LR infusion 1200.00 mL    Total Intake 1200 mL       Output    Urine 300 mL    Total Output 300 mL       Net    Net Volume 900 mL          Specimen: No specimens collected     Staff:   Circulator: Shanti Echevarria RN  Relief Circulator: Mercy Deshpande RN  Scrub Person: Evgeny Chavez; Lisa Hamilton RN; Deisi Gonzalez LPN; Jose Bowens          Findings: Exposed mandibular recon plate anteriorly with underlying mucosa attached to the mandible. 3 central/symphysial screws removed with screw . Cutting drill used to remove the recon  plate near the angle of the mandible, bilaterally. Small portion of the plate left attached to the ramus bilaterally. Mucosa closed primarily. No exposed bone present anteriorly.     Complications:  None; patient tolerated the procedure well.     Disposition: PACU - hemodynamically stable.  Condition: stable  Specimens Collected: No specimens collected  Attending Attestation:     Roderick David  Phone Number: [phone]

## 2024-02-28 NOTE — ANESTHESIA PREPROCEDURE EVALUATION
Patient: Chris Fitzgerald    Procedure Information       Anesthesia Start Date/Time: 02/28/24 0827    Procedures:       Direct Laryngoscopy      Creation Tracheostomy      Hand Carpal Scaphoid Bone Graft/Int Fix (Bilateral)      Lower Extremity Free Flap (Bilateral)    Location: [facility] OR 03 / [facility] OR    Surgeons: Roderick David MD; Wilda Cedeño MD            Relevant Problems   Anesthesia (within normal limits)      Eyes, Ears, Nose, and Throat  ORN mandible.       Clinical information reviewed:   Tobacco  Allergies  Meds   Med Hx  Surg Hx   Fam Hx  Soc Hx        NPO Detail:  NPO/Void Status  Carbohydrate Drink Given Prior to Surgery? : N  Date of Last Liquid: 02/28/24  Time of Last Liquid: 0000  Date of Last Solid: 02/28/24  Time of Last Solid: 0000  Last Intake Type: Clear fluids         Physical Exam    Airway  Mallampati: III     Cardiovascular    Dental    Pulmonary    Abdominal            Anesthesia Plan    History of general anesthesia?: yes  History of complications of general anesthesia?: no    ASA 3     general     intravenous induction   Anesthetic plan and risks discussed with patient.

## 2024-02-28 NOTE — CARE PLAN
The patient's goals for the shift include pain control    The clinical goals for the shift include VS WNL    Over the shift, the patient did not make progress toward the following goals. Barriers to progression include new surgery. Recommendations to address these barriers include assessing pain often.

## 2024-02-28 NOTE — ANESTHESIA POSTPROCEDURE EVALUATION
Patient: Chris Fitzgerald    Procedure Summary       Date: 02/28/24 Room / Location: [facility] OR 03 / [facility]    Anesthesia Start: 0827 Anesthesia Stop: 1150    Procedure: Direct Laryngoscopy Diagnosis:       Exposed orthopaedic hardware (CMS/HCC)      (Exposed orthopaedic hardware (CMS/HCC) [T84.498A])    Surgeons: Roderick David MD Responsible Provider: Deandra Barone MD    Anesthesia Type: general ASA Status: 3            Anesthesia Type: general    Vitals Value Taken Time   /99 02/28/24 1202   Temp 35.2 °C (95.4 °F) 02/28/24 1145   Pulse 68 02/28/24 1206   Resp 12 02/28/24 1206   SpO2 98 % 02/28/24 1206   Vitals shown include unvalidated device data.    Anesthesia Post Evaluation    Patient location during evaluation: PACU  Patient participation: complete - patient participated  Level of consciousness: awake  Pain score: 1  Pain management: adequate  Airway patency: patent  Cardiovascular status: acceptable  Respiratory status: acceptable  Hydration status: acceptable  Postoperative Nausea and Vomiting: none        No notable events documented.

## 2024-02-28 NOTE — ANESTHESIA PROCEDURE NOTES
Arterial Line:    Date/Time: 2/28/2024 9:11 AM    Staffing  Performed: SANDEEP   Authorized by: Deandra Barone MD    Performed by: BEBA Anderson    An arterial line was placed. Procedure performed using ultrasound guidance.in the OR for the following indication(s): continuous blood pressure monitoring and blood sampling needed.    A 20 gauge (size), 1 and 3/4 inch (length), Angiocath (type) catheter was placed into the Right radial artery, secured by Tegaderm,   Seldinger technique used.  Events:  patient tolerated procedure well with no complications.

## 2024-02-28 NOTE — ANESTHESIA PROCEDURE NOTES
Peripheral IV  Date/Time: 2/28/2024 9:00 AM      Placement  Needle size: 16 G  Location: forearm  Local anesthetic: injectable  Site prep: alcohol  Technique: anatomical landmarks  Attempts: 1

## 2024-02-28 NOTE — HOSPITAL COURSE
Patient is a 53 y.o. male who underwent direct laryngoscopy and removal of mandibular hardware.  The patient tolerated procedure well. They recovered briefly in the PACU before being transferred to a regular nursing floor. Pt was kept NPO. Tube feeds were started and advanced. Pain control was transitioned to enteral. At time of discharge, pt is afebrile, AVSS, pain is well controlled. Patient is tolerating a diet, voiding without issue, and ambulating independently. All incisions are clean, dry and intact. There is no evidence of hematoma or seroma. Pt is discharged to home in stable condition with instructions to follow-up as an outpatient.

## 2024-02-28 NOTE — PROGRESS NOTES
Pharmacy Medication History Review    Chris Fitzgerald is a 53 y.o. male admitted for Exposed orthopaedic hardware (CMS/[facility]). Pharmacy reviewed the patient's qbhri-sj-jaoltyjzu medications and allergies for accuracy.    The list below reflects the updated PTA list. Comments regarding how patient may be taking medications differently can be found in the Admit Orders Activity  Prior to Admission Medications   Prescriptions Last Dose Informant   Vitamin D3 50 mcg (2,000 unit) tablet Past Week    Sig: Take 1 tablet (2,000 Units) by mouth once daily.   atorvastatin (Lipitor) 40 mg tablet Past Week    Sig: Take 1 tablet (40 mg) by mouth once daily.   chlorhexidine (Hibiclens) 4 % external liquid     Sig: Use as directed daily preoperatively   chlorhexidine (Peridex) 0.12 % solution     Sig: Swish and spit. Do not swallow. Use the night before and morning of surgery as directed      Facility-Administered Medications: None       The list below reflects the updated allergy list. Please review each documented allergy for additional clarification and justification.  Allergies  Reviewed by Chuck Ray RN on 2/28/2024   No Known Allergies         M2B service not offered prior to surgery, please reassess prior to patient discharge if Meds to Beds is desired. Pharmacy has been updated to [facility].    Sources used to complete the med history include out patient fill history, OARRS, and patient interview   Reliable historian    Below are additional concerns with the patient's PTA list.  N/A    Mike Ruiz PharmD  Transitions of Care Pharmacist  [facility]  Please reach out via Secure Chat for questions, or if no response call [employer] or [employer]

## 2024-02-28 NOTE — OP NOTE
Direct Laryngoscopy Operative Note     Date: 2024  OR Location: [facility] OR    Name: Chris Fitzgerald, : 1970, Age: 53 y.o., MRN: [id number], Sex: male    Diagnosis  Pre-op Diagnosis     * Exposed orthopaedic hardware (CMS/HCC) [T84.498A] Post-op Diagnosis     * Exposed orthopaedic hardware (CMS/HCC) [T84.498A]     Procedures  Direct laryngoscopy  Removal of mandibular hardware    Surgeons      * Roderick David - Primary    Resident/Fellow/Other Assistant:  Surgeon(s) and Role:     * Alessandro Harvey MD - Assisting    Procedure Summary  Anesthesia: General  ASA: III  Anesthesia Staff: Anesthesiologist: Deandra Barone MD  CRNA: MARCO A Sams-CRNA  C-AA: BEBA Anderson  SANDEEP: Jaden Seipp  Estimated Blood Loss: 20 mL  Intra-op Medications:   Administrations occurring from 0815 to 1615 on 24:   Medication Name Total Dose   sodium chloride 0.9 % irrigation solution 1,000 mL   sterile water irrigation solution 1,000 mL   bacitracin ointment 1 Application   HYDROmorphone (Dilaudid) injection 0.2 mg 0.6 mg   oxygen (O2) therapy Cannot be calculated   HYDROmorphone (Dilaudid) injection 0.5 mg 1 mg              Anesthesia Record               Intraprocedure I/O Totals          Intake    Remifentanil Drip 0.00 mL    The total shown is the total volume documented since Anesthesia Start was filed.    Phenylephrine Drip 0.00 mL    The total shown is the total volume documented since Anesthesia Start was filed.    LR infusion 1200.00 mL    Total Intake 1200 mL       Output    Urine 300 mL    Total Output 300 mL       Net    Net Volume 900 mL          Specimen: No specimens collected     Staff:   Circulator: Shanti Echevarria RN  Relief Circulator: Mercy Deshpande RN  Scrub Person: Evgeny Chavez; Lisa Hamilton RN; Deisi Gonzalez LPN; Jose Bowens         Drains and/or Catheters:   Gastrostomy/Enterostomy PEG-jejunostomy LUQ (Active)       [REMOVED] Urethral Catheter Non-latex 16 Fr.  (Removed)       Findings: Minimal exposed bone    Indications: Chris Fitzgerald is an 53 y.o. male who is having surgery for Exposed orthopaedic hardware (CMS/[facility]) [T84.892U].     The patient was seen in the preoperative area. The risks, benefits, complications, treatment options, non-operative alternatives, expected recovery and outcomes were discussed with the patient. The possibilities of reaction to medication, pulmonary aspiration, injury to surrounding structures, bleeding, recurrent infection, the need for additional procedures, failure to diagnose a condition, and creating a complication requiring transfusion or operation were discussed with the patient. The patient concurred with the proposed plan, giving informed consent.  The site of surgery was properly noted/marked if necessary per policy. The patient has been actively warmed in preoperative area. Preoperative antibiotics have been ordered and given within 1 hours of incision. Venous thrombosis prophylaxis have been ordered including bilateral sequential compression devices    Procedure Details: Patient was taken back to the operating room and laid supine on the table.  Timeout was performed, general anesthesia induced, and patient was intubated.  The Dedo laryngoscope was then used to expose the posterior oral cavity, oropharynx.  It was difficult to expose the hypopharynx or larynx.  There were no concerning lesions.    Next all potential surgery and donor sites were prepped and draped in sterile fashion.  We started with close examination of the mandibular hardware.  I was able to remove this screws in the central midline.  The lateral screws extending back towards the retromolar trigone were ossified and I was unable to remove them.  Therefore the Midas vianca drill was used to remove the visible posterior screws and to cut the plate near the angle.  This was completed bilaterally.  The exposed area of the hardware was removed.The mucosal edges were then  freshened and 3-0 vicryl suture was used to close the incision. At this point the case was complete  Complications:  None; patient tolerated the procedure well.    Disposition: PACU - hemodynamically stable.  Condition: stable         Roderick David  Phone Number: [phone]

## 2024-02-29 ENCOUNTER — PHARMACY VISIT (OUTPATIENT)
Dept: PHARMACY | Facility: CLINIC | Age: 54
End: 2024-02-29
Payer: MEDICAID

## 2024-02-29 VITALS
DIASTOLIC BLOOD PRESSURE: 76 MMHG | OXYGEN SATURATION: 94 % | TEMPERATURE: 99.2 F | SYSTOLIC BLOOD PRESSURE: 152 MMHG | WEIGHT: 169.75 LBS | HEIGHT: 68 IN | HEART RATE: 84 BPM | RESPIRATION RATE: 18 BRPM | BODY MASS INDEX: 25.73 KG/M2

## 2024-02-29 PROCEDURE — 99231 SBSQ HOSP IP/OBS SF/LOW 25: CPT | Performed by: NURSE PRACTITIONER

## 2024-02-29 PROCEDURE — 2500000001 HC RX 250 WO HCPCS SELF ADMINISTERED DRUGS (ALT 637 FOR MEDICARE OP): Performed by: STUDENT IN AN ORGANIZED HEALTH CARE EDUCATION/TRAINING PROGRAM

## 2024-02-29 PROCEDURE — 2500000004 HC RX 250 GENERAL PHARMACY W/ HCPCS (ALT 636 FOR OP/ED): Performed by: STUDENT IN AN ORGANIZED HEALTH CARE EDUCATION/TRAINING PROGRAM

## 2024-02-29 RX ADMIN — Medication 2000 UNITS: at 09:37

## 2024-02-29 RX ADMIN — ATORVASTATIN CALCIUM 40 MG: 40 TABLET, FILM COATED ORAL at 09:37

## 2024-02-29 RX ADMIN — AMPICILLIN AND SULBACTAM 3 G: 2; 1 INJECTION, POWDER, FOR SOLUTION INTRAVENOUS at 03:09

## 2024-02-29 RX ADMIN — ACETAMINOPHEN 1000 MG: 650 SOLUTION ORAL at 06:16

## 2024-02-29 RX ADMIN — ACETAMINOPHEN 1000 MG: 650 SOLUTION ORAL at 14:20

## 2024-02-29 ASSESSMENT — PAIN - FUNCTIONAL ASSESSMENT: PAIN_FUNCTIONAL_ASSESSMENT: 0-10

## 2024-02-29 ASSESSMENT — PAIN SCALES - GENERAL: PAINLEVEL_OUTOF10: 2

## 2024-02-29 NOTE — NURSING NOTE
Discharge instructions reviewed with patient. All questions reviewed with patient. 1530 pt walked off unit for discharge with this RN.

## 2024-02-29 NOTE — DISCHARGE INSTRUCTIONS
DEPARTMENT OF OTOLARYNGOLOGY (ENT)  DISCHARGE INSTRUCTIONS    C O N F I D E N T I A L   I N F O R M A T I O N  -------------------------------------------------------------------------------------------------------------------    Chris Fitzgerald  [id number]    The following is a brief overview of your hospitalization. Some of the information contained on this summary may be confidential.  This information should be kept in your records and should be shared with your regular doctor.    Admission Date:2/28/2024  5:51 AM  Discharge Date: No discharge date for patient encounter.    Disposition:  Home    PRINCIPAL DIAGNOSIS: exposed mandibular hardware    Physicians:   Dr. David    Operations performed while hospitalized:   Direct laryngoscopy, removal of mandibular hardware    Additional findings:   N/A    Treatment/wound care:   Keep area(s) clean and dry.   It is okay to shower 48 hours after time of surgery.    Do not scrub wound(s), pat dry.    Do not submerge wound(s) in standing water until seen in followup (no tub bathing, swimming, or hot tubs).    No wide mouth opening until follow up appointment with Dr. David    Pending results: N/A    Pain Control:    Adequate management:   Tramadol can be taken as prescribed as needed for breakthrough pain.    Tramadol is a narcotic, which can induce constipation.   Please take stool softeners when taking this medication to prevent constipation.    Activity after Discharge:   No heavy lifting, weight bearing as tolerated, no driving until mobility is returned to normal.   Do not submerge wound(s) in standing water for 7 days after surgery (no tub bathing, swimming, or hot tubs).   Do not drive or operate heavy machinery while taking narcotic pain medications as these medications can alter perception, impair judgement, and slow reaction times.    Diet: Continue regular tube feeding regimen

## 2024-02-29 NOTE — DISCHARGE SUMMARY
Discharge Diagnosis  Exposed orthopaedic hardware (CMS/HCC)    Issues Requiring Follow-Up  Post op visit    Test Results Pending At Discharge  Pending Labs       No current pending labs.            Hospital Course  Patient is a 53 y.o. male who underwent direct laryngoscopy and removal of mandibular hardware.  The patient tolerated procedure well. They recovered briefly in the PACU before being transferred to a regular nursing floor. Pt was kept NPO. Tube feeds were started and advanced. Pain control was transitioned to enteral. At time of discharge, pt is afebrile, AVSS, pain is well controlled. Patient is tolerating a diet, voiding without issue, and ambulating independently. All incisions are clean, dry and intact. There is no evidence of hematoma or seroma. Pt is discharged to home in stable condition with instructions to follow-up as an outpatient.      Pertinent Physical Exam At Time of Discharge  Physical Exam  Vitals reviewed in EMR  Gen: NAD, AOx3, resting comfortably in bed  Eyes: EOMI, sclera clear, PERRL  Ears: Normal external ears bilaterally  Nose: No rhinorrhea; anterior nares clear  Oral Cavity: DANISH  Head: normocephalic, atraumatic  Neck: Post radiation changes  Resp: non labored breathing on room air  Cards: Appears well perfused  Gastro: Soft, non-distended,   : voids  Extremities: moves all extremities  Psych: Appropriate mood and affect    Home Medications     Medication List      START taking these medications     amoxicillin-pot clavulanate 875-125 mg tablet; Commonly known as:   Augmentin; 1 tablet by g-tube route 2 times a day for 13 days.   Senexon-S 8.6-50 mg tablet; Generic drug: sennosides-docusate sodium; 1   tablet by g-tube route once daily for 5 days.   traMADol 50 mg tablet; Commonly known as: Ultram; Take 1 tablet (50 mg)   by mouth every 8 hours if needed for severe pain (7 - 10) for up to 5   days.     CHANGE how you take these medications     atorvastatin 40 mg tablet; Commonly  known as: Lipitor; 1 tablet (40 mg)   by g-tube route once daily.; What changed: how to take this   Vitamin D3 50 MCG (2000 UT) tablet; Generic drug: cholecalciferol; 1   tablet (2,000 Units) by g-tube route once daily.; What changed: how to   take this     STOP taking these medications     chlorhexidine 0.12 % solution; Commonly known as: Peridex   chlorhexidine 4 % external liquid; Commonly known as: Hibiclens       Outpatient Follow-Up  Future Appointments   Date Time Provider Department Center   3/22/2024 10:30 AM Roderick David MD [id number] [place]       Ledy Fitzgerald, APRN-CNP

## 2024-03-18 ENCOUNTER — OFFICE VISIT (OUTPATIENT)
Dept: OTOLARYNGOLOGY | Facility: CLINIC | Age: 54
End: 2024-03-18
Payer: COMMERCIAL

## 2024-03-18 VITALS
SYSTOLIC BLOOD PRESSURE: 128 MMHG | TEMPERATURE: 97.2 F | WEIGHT: 170.2 LBS | HEIGHT: 68 IN | BODY MASS INDEX: 25.79 KG/M2 | DIASTOLIC BLOOD PRESSURE: 75 MMHG

## 2024-03-18 DIAGNOSIS — Z85.819 HISTORY OF ORAL CANCER: ICD-10-CM

## 2024-03-18 DIAGNOSIS — T84.498A EXPOSED ORTHOPAEDIC HARDWARE (CMS-HCC): Primary | ICD-10-CM

## 2024-03-18 PROCEDURE — 1036F TOBACCO NON-USER: CPT | Performed by: OTOLARYNGOLOGY

## 2024-03-18 PROCEDURE — 99024 POSTOP FOLLOW-UP VISIT: CPT | Performed by: OTOLARYNGOLOGY

## 2024-03-18 NOTE — PROGRESS NOTES
ENT Follow up Visit    History Of Present Illness  Chris Fitzgerald is a 53 y.o. male presents for follow up    52-year-old male referred by Dr. 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 This included a segmental mandibulectomy, neck dissection, floor mouth resection, left fibular flap reconstruction. Records from 2008 were reviewed and there were some postoperative complications related to a fistula. It is unclear whether or not they felt the flap was dead at that time. He has not had any sign of recurrence since that time. He has been having more oral cavity pain and exposed mandibular hardware was identified. He had some biopsies performed by Dr. Pandya that were negative for cancer. He is PEG dependent     Past medical history includes hyperlipidemia, oral cavity cancer, depression. Prior surgeriesn include above. Family history reviewed and significant for cardiac disease. He has remote history of smoking. He occasionally uses marijuana. Social drinking     6/12/23: Patient returns for follow-up after scans. This was personally reviewed. The plate extends up the mandibular ramus bilaterally. The underlying bone actually looks fused and there does not appear to be any significant abnormality. He had a CTA of the lower extremities as well there are some difficulty in uploading the images for review     11/13/23: Patient returns for follow-up.  He got established with his primary care physician.  He has also gained some weight since his last visit.  He is interested in proceeding with surgery    3/18/24: Patient returns for follow-up.  He was taken to the operating room and we are able to remove the exposed portion of the plate.  We used a cutting drill to make posterior cuts on the plate as far back as we could reach transorally.  These areas were then closed  primarily.  He states his pain has been getting better     Past Medical History  He has a past medical history of Acute back pain, Depression, Dysphagia, Edentulous, Gastrostomy tube dependent (CMS/HCC), History of malignant neoplasm of oral cavity (2008), History of radiation therapy (2008), Shoulder pain, and Vision loss.    Surgical History  He has a past surgical history that includes Colonoscopy; Modified radical neck dissection (2008); Gastrostomy tube placement (08/29/2023); and Hernia repair.     Social History  He reports that he has quit smoking. His smoking use included cigarettes. He has never used smokeless tobacco. He reports current alcohol use. He reports current drug use. Frequency: 2.00 times per week. Drug: Marijuana.    Family History  Family History   Problem Relation Name Age of Onset    Stroke Mother      Kidney disease Mother      Hypertension Mother      Heart disease Mother      Breast cancer Mother      Heart disease Father          Allergies  Patient has no known allergies.     Physical Exam:  CONSTITUTIONAL: Vitals -reviewed from intake field, well developed, well nourished.   VOICE: Slightly muffled due to surgical changes in the oral cavity  RESPIRATION: Breathing comfortably, no stridor.   CV: No clubbing/cyanosis/edema in hands.   EYES: EOM Intact, sclera normal.   NEURO: Alert and oriented times 3, Cranial nerves II-XII intact and symmetric bilaterally.   HEAD AND FACE: Symmetric facial features, no masses or lesions, sinuses nontender to palpation.   SALIVARY GLANDS: Parotid and submandibular glands normal bilaterally.   EARS: Normal external ears, external auditory canals, and TMs to otoscopy, normal hearing to whispered voice.   NOSE: External nose midline, anterior rhinoscopy is normal with limited visualization to the anterior aspect of the interior turbinates. No lesions noted.   ORAL CAVITY/OROPHARYNX/LIPS: Surgical changes throughout the oral cavity.  The intraoral incisions  are all well-healed with no exposed bone.  Granulation tissue previously around the plate is improved PHARYNGEAL WALLS: Difficult to visualize  NECK/LYMPH: Significant scarring to the neck. No palpable nodes  SKIN: Neck skin is without scar or injury   PSYCH: Alert and oriented with appropriate mood and affect          Assessment and Plan  53 y.o. male with history of composite resection, fibular flap, radiation back in 2008. He was referred for exposed mandibular hardware which was removed     -We reviewed the intraoperative findings.  I explained that he still has some residual plate left on the ascending ramus of the mandible bilaterally.  There is no exposed hardware or bone intraorally    Roderick David MD

## 2024-03-22 ENCOUNTER — APPOINTMENT (OUTPATIENT)
Dept: OTOLARYNGOLOGY | Facility: CLINIC | Age: 54
End: 2024-03-22
Payer: COMMERCIAL

## 2024-05-16 ENCOUNTER — APPOINTMENT (OUTPATIENT)
Dept: CT IMAGING | Age: 54
End: 2024-05-16
Payer: COMMERCIAL

## 2024-05-16 ENCOUNTER — APPOINTMENT (OUTPATIENT)
Dept: GENERAL RADIOLOGY | Age: 54
End: 2024-05-16
Payer: COMMERCIAL

## 2024-05-16 ENCOUNTER — HOSPITAL ENCOUNTER (INPATIENT)
Age: 54
LOS: 2 days | Discharge: HOME | End: 2024-05-18
Attending: EMERGENCY MEDICINE | Admitting: INTERNAL MEDICINE
Payer: COMMERCIAL

## 2024-05-16 DIAGNOSIS — R22.0 TONGUE SWELLING: Primary | ICD-10-CM

## 2024-05-16 PROBLEM — T78.3XXA ANGIOEDEMA OF TONGUE: Status: ACTIVE | Noted: 2024-05-16

## 2024-05-16 LAB
ANION GAP SERPL CALCULATED.3IONS-SCNC: 12 MMOL/L (ref 9–16)
BASOPHILS # BLD: 0 K/UL (ref 0–0.2)
BASOPHILS NFR BLD: 0 % (ref 0–2)
BUN SERPL-MCNC: 16 MG/DL (ref 6–20)
CALCIUM SERPL-MCNC: 9.5 MG/DL (ref 8.6–10.4)
CHLORIDE SERPL-SCNC: 102 MMOL/L (ref 98–107)
CO2 SERPL-SCNC: 24 MMOL/L (ref 20–31)
CREAT SERPL-MCNC: 0.8 MG/DL (ref 0.7–1.2)
EOSINOPHIL # BLD: 0 K/UL (ref 0–0.44)
EOSINOPHILS RELATIVE PERCENT: 0 % (ref 1–4)
ERYTHROCYTE [DISTWIDTH] IN BLOOD BY AUTOMATED COUNT: 11.9 % (ref 11.8–14.4)
GFR, ESTIMATED: >90 ML/MIN/1.73M2
GLUCOSE SERPL-MCNC: 163 MG/DL (ref 74–99)
HCT VFR BLD AUTO: 46.5 % (ref 40.7–50.3)
HGB BLD-MCNC: 15.7 G/DL (ref 13–17)
IMM GRANULOCYTES # BLD AUTO: 0.2 K/UL (ref 0–0.3)
IMM GRANULOCYTES NFR BLD: 1 %
LYMPHOCYTES NFR BLD: 0.59 K/UL (ref 1.1–3.7)
LYMPHOCYTES RELATIVE PERCENT: 3 % (ref 24–43)
MCH RBC QN AUTO: 32.7 PG (ref 25.2–33.5)
MCHC RBC AUTO-ENTMCNC: 33.8 G/DL (ref 28.4–34.8)
MCV RBC AUTO: 96.9 FL (ref 82.6–102.9)
MONOCYTES NFR BLD: 0.59 K/UL (ref 0.1–1.2)
MONOCYTES NFR BLD: 3 % (ref 3–12)
MORPHOLOGY: NORMAL
NEUTROPHILS NFR BLD: 93 % (ref 36–65)
NEUTS SEG NFR BLD: 18.42 K/UL (ref 1.5–8.1)
NRBC BLD-RTO: 0 PER 100 WBC
PLATELET # BLD AUTO: 307 K/UL (ref 138–453)
PMV BLD AUTO: 10.9 FL (ref 8.1–13.5)
POTASSIUM SERPL-SCNC: 3.9 MMOL/L (ref 3.7–5.3)
RBC # BLD AUTO: 4.8 M/UL (ref 4.21–5.77)
SODIUM SERPL-SCNC: 138 MMOL/L (ref 136–145)
WBC OTHER # BLD: 19.8 K/UL (ref 3.5–11.3)

## 2024-05-16 PROCEDURE — 2500000003 HC RX 250 WO HCPCS

## 2024-05-16 PROCEDURE — 71045 X-RAY EXAM CHEST 1 VIEW: CPT

## 2024-05-16 PROCEDURE — 6360000002 HC RX W HCPCS

## 2024-05-16 PROCEDURE — 96372 THER/PROPH/DIAG INJ SC/IM: CPT

## 2024-05-16 PROCEDURE — 96374 THER/PROPH/DIAG INJ IV PUSH: CPT

## 2024-05-16 PROCEDURE — 96375 TX/PRO/DX INJ NEW DRUG ADDON: CPT

## 2024-05-16 PROCEDURE — 6360000004 HC RX CONTRAST MEDICATION

## 2024-05-16 PROCEDURE — 2580000003 HC RX 258

## 2024-05-16 PROCEDURE — 2580000003 HC RX 258: Performed by: STUDENT IN AN ORGANIZED HEALTH CARE EDUCATION/TRAINING PROGRAM

## 2024-05-16 PROCEDURE — 6360000002 HC RX W HCPCS: Performed by: STUDENT IN AN ORGANIZED HEALTH CARE EDUCATION/TRAINING PROGRAM

## 2024-05-16 PROCEDURE — 2500000003 HC RX 250 WO HCPCS: Performed by: STUDENT IN AN ORGANIZED HEALTH CARE EDUCATION/TRAINING PROGRAM

## 2024-05-16 PROCEDURE — 2000000000 HC ICU R&B

## 2024-05-16 PROCEDURE — 86160 COMPLEMENT ANTIGEN: CPT

## 2024-05-16 PROCEDURE — 99285 EMERGENCY DEPT VISIT HI MDM: CPT

## 2024-05-16 PROCEDURE — 80048 BASIC METABOLIC PNL TOTAL CA: CPT

## 2024-05-16 PROCEDURE — 70491 CT SOFT TISSUE NECK W/DYE: CPT

## 2024-05-16 PROCEDURE — 85025 COMPLETE CBC W/AUTO DIFF WBC: CPT

## 2024-05-16 PROCEDURE — 36415 COLL VENOUS BLD VENIPUNCTURE: CPT

## 2024-05-16 PROCEDURE — 87641 MR-STAPH DNA AMP PROBE: CPT

## 2024-05-16 PROCEDURE — A4216 STERILE WATER/SALINE, 10 ML: HCPCS | Performed by: STUDENT IN AN ORGANIZED HEALTH CARE EDUCATION/TRAINING PROGRAM

## 2024-05-16 PROCEDURE — A4216 STERILE WATER/SALINE, 10 ML: HCPCS

## 2024-05-16 RX ORDER — SODIUM CHLORIDE 9 MG/ML
INJECTION, SOLUTION INTRAVENOUS CONTINUOUS
Status: ACTIVE | OUTPATIENT
Start: 2024-05-16 | End: 2024-05-17

## 2024-05-16 RX ORDER — POTASSIUM CHLORIDE 7.45 MG/ML
10 INJECTION INTRAVENOUS PRN
Status: DISCONTINUED | OUTPATIENT
Start: 2024-05-16 | End: 2024-05-18 | Stop reason: HOSPADM

## 2024-05-16 RX ORDER — POLYETHYLENE GLYCOL 3350 17 G/17G
17 POWDER, FOR SOLUTION ORAL DAILY PRN
Status: DISCONTINUED | OUTPATIENT
Start: 2024-05-16 | End: 2024-05-18 | Stop reason: HOSPADM

## 2024-05-16 RX ORDER — SODIUM CHLORIDE 0.9 % (FLUSH) 0.9 %
5-40 SYRINGE (ML) INJECTION PRN
Status: DISCONTINUED | OUTPATIENT
Start: 2024-05-16 | End: 2024-05-18 | Stop reason: HOSPADM

## 2024-05-16 RX ORDER — ACETAMINOPHEN 650 MG/1
650 SUPPOSITORY RECTAL EVERY 6 HOURS PRN
Status: DISCONTINUED | OUTPATIENT
Start: 2024-05-16 | End: 2024-05-18 | Stop reason: HOSPADM

## 2024-05-16 RX ORDER — FAMOTIDINE 10 MG/ML
INJECTION, SOLUTION INTRAVENOUS
Status: DISPENSED
Start: 2024-05-16 | End: 2024-05-17

## 2024-05-16 RX ORDER — WATER 10 ML/10ML
INJECTION INTRAMUSCULAR; INTRAVENOUS; SUBCUTANEOUS
Status: DISCONTINUED
Start: 2024-05-16 | End: 2024-05-16 | Stop reason: WASHOUT

## 2024-05-16 RX ORDER — EPINEPHRINE 1 MG/ML
0.3 INJECTION, SOLUTION INTRAMUSCULAR; SUBCUTANEOUS ONCE
Status: COMPLETED | OUTPATIENT
Start: 2024-05-16 | End: 2024-05-16

## 2024-05-16 RX ORDER — DIPHENHYDRAMINE HYDROCHLORIDE 50 MG/ML
50 INJECTION INTRAMUSCULAR; INTRAVENOUS ONCE
Status: COMPLETED | OUTPATIENT
Start: 2024-05-16 | End: 2024-05-16

## 2024-05-16 RX ORDER — SODIUM CHLORIDE 0.9 % (FLUSH) 0.9 %
5-40 SYRINGE (ML) INJECTION EVERY 12 HOURS SCHEDULED
Status: DISCONTINUED | OUTPATIENT
Start: 2024-05-16 | End: 2024-05-18 | Stop reason: HOSPADM

## 2024-05-16 RX ORDER — ONDANSETRON 2 MG/ML
4 INJECTION INTRAMUSCULAR; INTRAVENOUS EVERY 6 HOURS PRN
Status: DISCONTINUED | OUTPATIENT
Start: 2024-05-16 | End: 2024-05-18 | Stop reason: HOSPADM

## 2024-05-16 RX ORDER — DEXAMETHASONE SODIUM PHOSPHATE 10 MG/ML
10 INJECTION, SOLUTION INTRAMUSCULAR; INTRAVENOUS ONCE
Status: COMPLETED | OUTPATIENT
Start: 2024-05-16 | End: 2024-05-16

## 2024-05-16 RX ORDER — DIPHENHYDRAMINE HYDROCHLORIDE 50 MG/ML
INJECTION INTRAMUSCULAR; INTRAVENOUS
Status: COMPLETED
Start: 2024-05-16 | End: 2024-05-16

## 2024-05-16 RX ORDER — ACETAMINOPHEN 325 MG/1
650 TABLET ORAL EVERY 6 HOURS PRN
Status: DISCONTINUED | OUTPATIENT
Start: 2024-05-16 | End: 2024-05-18 | Stop reason: HOSPADM

## 2024-05-16 RX ORDER — DIPHENHYDRAMINE HYDROCHLORIDE 50 MG/ML
25 INJECTION INTRAMUSCULAR; INTRAVENOUS EVERY 6 HOURS
Status: DISCONTINUED | OUTPATIENT
Start: 2024-05-16 | End: 2024-05-18 | Stop reason: HOSPADM

## 2024-05-16 RX ORDER — METHYLPREDNISOLONE SODIUM SUCCINATE 125 MG/2ML
INJECTION, POWDER, LYOPHILIZED, FOR SOLUTION INTRAMUSCULAR; INTRAVENOUS
Status: DISCONTINUED
Start: 2024-05-16 | End: 2024-05-16 | Stop reason: WASHOUT

## 2024-05-16 RX ORDER — ICATIBANT 30 MG/3ML
30 INJECTION, SOLUTION SUBCUTANEOUS ONCE
Status: COMPLETED | OUTPATIENT
Start: 2024-05-16 | End: 2024-05-16

## 2024-05-16 RX ORDER — POTASSIUM CHLORIDE 29.8 MG/ML
20 INJECTION INTRAVENOUS PRN
Status: DISCONTINUED | OUTPATIENT
Start: 2024-05-16 | End: 2024-05-18 | Stop reason: HOSPADM

## 2024-05-16 RX ORDER — KETOROLAC TROMETHAMINE 15 MG/ML
15 INJECTION, SOLUTION INTRAMUSCULAR; INTRAVENOUS ONCE
Status: COMPLETED | OUTPATIENT
Start: 2024-05-16 | End: 2024-05-16

## 2024-05-16 RX ORDER — ENOXAPARIN SODIUM 100 MG/ML
30 INJECTION SUBCUTANEOUS 2 TIMES DAILY
Status: DISCONTINUED | OUTPATIENT
Start: 2024-05-16 | End: 2024-05-18 | Stop reason: HOSPADM

## 2024-05-16 RX ORDER — MAGNESIUM SULFATE IN WATER 40 MG/ML
2000 INJECTION, SOLUTION INTRAVENOUS PRN
Status: DISCONTINUED | OUTPATIENT
Start: 2024-05-16 | End: 2024-05-18 | Stop reason: HOSPADM

## 2024-05-16 RX ORDER — EPINEPHRINE 1 MG/ML
INJECTION, SOLUTION INTRAMUSCULAR; SUBCUTANEOUS
Status: COMPLETED
Start: 2024-05-16 | End: 2024-05-16

## 2024-05-16 RX ORDER — DEXAMETHASONE SODIUM PHOSPHATE 10 MG/ML
INJECTION INTRAMUSCULAR; INTRAVENOUS
Status: DISPENSED
Start: 2024-05-16 | End: 2024-05-17

## 2024-05-16 RX ORDER — SODIUM CHLORIDE 9 MG/ML
INJECTION, SOLUTION INTRAVENOUS PRN
Status: DISCONTINUED | OUTPATIENT
Start: 2024-05-16 | End: 2024-05-18 | Stop reason: HOSPADM

## 2024-05-16 RX ORDER — ONDANSETRON 4 MG/1
4 TABLET, ORALLY DISINTEGRATING ORAL EVERY 8 HOURS PRN
Status: DISCONTINUED | OUTPATIENT
Start: 2024-05-16 | End: 2024-05-18 | Stop reason: HOSPADM

## 2024-05-16 RX ADMIN — IOPAMIDOL 75 ML: 755 INJECTION, SOLUTION INTRAVENOUS at 19:09

## 2024-05-16 RX ADMIN — KETOROLAC TROMETHAMINE 15 MG: 15 INJECTION, SOLUTION INTRAMUSCULAR; INTRAVENOUS at 20:02

## 2024-05-16 RX ADMIN — DIPHENHYDRAMINE HYDROCHLORIDE 25 MG: 50 INJECTION INTRAMUSCULAR; INTRAVENOUS at 21:58

## 2024-05-16 RX ADMIN — SODIUM CHLORIDE, PRESERVATIVE FREE 10 ML: 5 INJECTION INTRAVENOUS at 21:59

## 2024-05-16 RX ADMIN — WATER 60 MG: 1 INJECTION INTRAMUSCULAR; INTRAVENOUS; SUBCUTANEOUS at 21:57

## 2024-05-16 RX ADMIN — FAMOTIDINE 20 MG: 10 INJECTION, SOLUTION INTRAVENOUS at 22:14

## 2024-05-16 RX ADMIN — DIPHENHYDRAMINE HYDROCHLORIDE 50 MG: 50 INJECTION INTRAMUSCULAR; INTRAVENOUS at 17:37

## 2024-05-16 RX ADMIN — ICATIBANT ACETATE 30 MG: 30 INJECTION, SOLUTION SUBCUTANEOUS at 21:57

## 2024-05-16 RX ADMIN — ENOXAPARIN SODIUM 30 MG: 100 INJECTION SUBCUTANEOUS at 21:57

## 2024-05-16 RX ADMIN — DEXAMETHASONE SODIUM PHOSPHATE 10 MG: 10 INJECTION INTRAMUSCULAR; INTRAVENOUS at 17:38

## 2024-05-16 RX ADMIN — EPINEPHRINE 0.3 MG: 1 INJECTION INTRAMUSCULAR; INTRAVENOUS; SUBCUTANEOUS at 17:36

## 2024-05-16 RX ADMIN — EPINEPHRINE 0.3 MG: 1 INJECTION, SOLUTION INTRAMUSCULAR; SUBCUTANEOUS at 17:36

## 2024-05-16 RX ADMIN — FAMOTIDINE 20 MG: 10 INJECTION, SOLUTION INTRAVENOUS at 17:37

## 2024-05-16 RX ADMIN — SODIUM CHLORIDE: 9 INJECTION, SOLUTION INTRAVENOUS at 21:08

## 2024-05-16 ASSESSMENT — ENCOUNTER SYMPTOMS
NAUSEA: 0
VOMITING: 0
SHORTNESS OF BREATH: 0
VOICE CHANGE: 1

## 2024-05-16 ASSESSMENT — PAIN SCALES - GENERAL
PAINLEVEL_OUTOF10: 6
PAINLEVEL_OUTOF10: 0

## 2024-05-16 ASSESSMENT — LIFESTYLE VARIABLES
HOW OFTEN DO YOU HAVE A DRINK CONTAINING ALCOHOL: NEVER
HOW MANY STANDARD DRINKS CONTAINING ALCOHOL DO YOU HAVE ON A TYPICAL DAY: PATIENT DOES NOT DRINK

## 2024-05-16 NOTE — ED PROVIDER NOTES
Note Started: 5:39 PM EDT         [facility]     Emergency Department     Faculty Attestation    I performed a history and physical examination of the patient and discussed management with the resident. I reviewed the resident’s note and agree with the documented findings and plan of care. Any areas of disagreement are noted on the chart. I was personally present for the key portions of any procedures. I have documented in the chart those procedures where I was not present during the key portions. I have reviewed the emergency nurses triage note. I agree with the chief complaint, past medical history, past surgical history, allergies, medications, social and family history as documented unless otherwise noted below.        For Physician Assistant/ Nurse Practitioner cases/documentation I have personally evaluated this patient and have completed at least one if not all key elements of the E/M (history, physical exam, and MDM). Additional findings are as noted.  I have personally seen and evaluated the patient.  I find the patient's history and physical exam are consistent with the NP/PA documentation.  I agree with the care provided, treatment rendered, disposition and follow-up plan.    53-year-old male with a history of oral cancer status post resection and reconstruction of the mandible 15+ years ago, G-tube dependent presenting with tongue swelling.  Woke up this morning with swelling of his tongue, difficulty speaking.    Exam:  General : Laying on the bed, awake, alert, and in no acute distress  CV : Tachycardic and regular rhythm  Lungs : No tachypnea or increased work of breathing.  Not hypoxic.  HEENT: Trach scar just above the jugular notch.  Jaw is scarred, smaller than expected.  Angioedema is present with tongue swelling, not protruding from the mouth.  Affecting patient speaking.  Patient is swallowing his own secretions.    DDx: Idiopathic angioedema of the tongue.  Patient is not

## 2024-05-16 NOTE — ED PROVIDER NOTES
[facility] ED  Emergency Department Encounter  Emergency Medicine Resident     Pt Name:Jaya Staley  MRN: [id number]  Birthdate 1970  Date of evaluation: 24  PCP:  Kay Warner APRN - CNP  Note Started: 5:39 PM EDT      CHIEF COMPLAINT       Chief Complaint   Patient presents with    Oral Swelling       HISTORY OF PRESENT ILLNESS  (Location/Symptom, Timing/Onset, Context/Setting, Quality, Duration, Modifying Factors, Severity.)      Jaya Staley is a 53 y.o. male with history of oral cancer status post surgery who presents with tongue swelling when he woke up this morning.  Patient has never had anything happen like this before.  He denies any new medications or tube feeds.  He does not take anything by mouth.  All nutrition is through his PEG tube.  He denies any antihypertensive medication such as lisinopril or any other ACE inhibitor's.  He denies being stung by bee.  No known allergies.  He denies any chest pain or shortness of breath.  No nausea or vomiting.    PAST MEDICAL / SURGICAL / SOCIAL / FAMILY HISTORY      has a past medical history of Cancer (HCC), Colon cancer screening, Colon polyps, Constipation, Dysphagia, Uses feeding tube, and Weight loss.     has a past surgical history that includes Mandible reconstruction; Colonoscopy (2018); Inguinal hernia repair (Left); Colonoscopy (N/A, 2023); and Colonoscopy (N/A, 2023).      Social History     Socioeconomic History    Marital status: Single     Spouse name: Not on file    Number of children: Not on file    Years of education: Not on file    Highest education level: Not on file   Occupational History    Not on file   Tobacco Use    Smoking status: Former     Current packs/day: 0.00     Average packs/day: 0.3 packs/day for 15.0 years (3.8 ttl pk-yrs)     Types: Cigarettes     Start date: 1990     Quit date: 2005     Years since quittin.3    Smokeless tobacco: Never   Vaping Use    Vaping

## 2024-05-16 NOTE — ED NOTES
Pt to room 17 via wheelchair with c/o oral swelling. Pt reports that he woke up and his tongue and lips were swollen. Pt states that he does not take lisinopril. Pt reports that he did have oral cancer and has received radiation before, but it has been years since last treatment. Pt reports that he has a g tube, denies any new tube feedings. Patient placed on continuous cardiac monitor, bp and pulse ox.   Pt tolerating secretions at this time, speaking in short sentences. Will continue plan of care.

## 2024-05-16 NOTE — ED NOTES
ED to inpatient nurses report      Chief Complaint:  Chief Complaint   Patient presents with    Oral Swelling     Present to ED from: home     MOA:     LOC: alert and orientated to name, place, date  Mobility: Requires assistance * 1  Oxygen Baseline: none     Current needs required: none   Pending ED orders: CT scan  Present condition: stable     Why did the patient come to the ED? Pt reports that he woke up and his tongue and lips were swollen. Pt states that he does not take lisinopril. Pt reports that he did have oral cancer and has received radiation before, but it has been years since last treatment. Pt reports that he has a g tube, denies any new tube feedings.  What is the plan? Admit   Any procedures or intervention occur? CT scan, anaphylaxis medications   Any safety concerns?? Potential airway complications    Mental Status:  Level of Consciousness: Alert (0)    Psych Assessment:   Psychosocial  Psychosocial (WDL): Within Defined Limits  Vital signs   Vitals:    05/16/24 1740 05/16/24 1800 05/16/24 1845 05/16/24 1855   BP:  118/83 125/79    Pulse: (!) 127 (!) 123 (!) 113 (!) 109   Resp: 30 (!) 32 28 28   Temp:       TempSrc:       SpO2: 99% 96% 97% 98%   Weight:            Vitals:  Patient Vitals for the past 24 hrs:   BP Temp Temp src Pulse Resp SpO2 Weight   05/16/24 1855 -- -- -- (!) 109 28 98 % --   05/16/24 1845 125/79 -- -- (!) 113 28 97 % --   05/16/24 1800 118/83 -- -- (!) 123 (!) 32 96 % --   05/16/24 1740 -- -- -- (!) 127 30 99 % --   05/16/24 1736 (!) 148/106 98.7 °F (37.1 °C) Oral -- -- -- --   05/16/24 1726 -- -- -- (!) 133 19 98 % 106.3 kg (234 lb 5.6 oz)      Visit Vitals  /79   Pulse (!) 109   Temp 98.7 °F (37.1 °C) (Oral)   Resp 28   Wt 106.3 kg (234 lb 5.6 oz)   SpO2 98%   BMI 35.11 kg/m²        LDAs:   Peripheral IV 05/16/24 Right Forearm (Active)   Site Assessment Clean, dry & intact 05/16/24 1743   Line Status Brisk blood return;Flushed;Specimen collected 05/16/24 9056  (3.8 ttl pk-yrs)     Types: Cigarettes     Start date: 1990     Quit date: 2005     Years since quittin.3    Smokeless tobacco: Never   Vaping Use    Vaping Use: Never used   Substance and Sexual Activity    Alcohol use: Yes     Comment: rare    Drug use: Yes     Types: Marijuana (Weed)     Comment: at times    Sexual activity: Yes     Partners: Female       FAMILY HISTORY       Family History   Problem Relation Age of Onset    High Blood Pressure Mother     Heart Disease Father        ALLERGIES     Patient has no known allergies.    CURRENT MEDICATIONS       Previous Medications    CHOLECALCIFEROL (VITAMIN D3) 50 MCG ( UT) TABS    Take 1 tablet by mouth daily    SENNA (SENOKOT) 8.8 MG/5ML SYRP SYRUP    Take 5 mLs by mouth 2 times daily     Orders Placed This Encounter   Medications    DISCONTD: methylPREDNISolone sodium succ (SOLU-MEDROL) 125 MG injection     ANIA, SANDRITA: cabinet override    diphenhydrAMINE (BENADRYL) 50 MG/ML injection     ANIA, SANDRITA: cabinet override    EPINEPHrine 1 MG/ML injection     ANIA, SANDRITA: cabinet override    famotidine (PEPCID) 20 MG/2ML injection     ANIA, SANDRITA: cabinet override    DISCONTD: sterile water injection     ANIA, SANDRTIA: cabinet override    dexAMETHasone (DECADRON) 10 MG/ML injection     ANIA, SANDRITA: cabinet override    diphenhydrAMINE (BENADRYL) injection 50 mg    EPINEPHrine 1 MG/ML injection 0.3 mg    famotidine (PEPCID) 20 mg in sodium chloride (PF) 0.9 % 10 mL injection    dexAMETHasone (PF) (DECADRON) injection 10 mg       SURGICAL HISTORY       Past Surgical History:   Procedure Laterality Date    COLONOSCOPY  2018    tubular adenoma    COLONOSCOPY N/A 2023    COLONOSCOPY DIAGNOSTIC performed by Noel Dickey MD at [facility] ENDO    COLONOSCOPY N/A 2023    COLONOSCOPY DIAGNOSTIC performed by Noel Dickey MD at [facility] ENDO    INGUINAL HERNIA REPAIR Left     as 10 year old    MANDIBLE RECONSTRUCTION         PAST

## 2024-05-17 LAB
ANION GAP SERPL CALCULATED.3IONS-SCNC: 10 MMOL/L (ref 9–16)
BASOPHILS # BLD: 0 K/UL (ref 0–0.2)
BASOPHILS NFR BLD: 0 % (ref 0–2)
BUN SERPL-MCNC: 19 MG/DL (ref 6–20)
CALCIUM SERPL-MCNC: 9.3 MG/DL (ref 8.6–10.4)
CHLORIDE SERPL-SCNC: 105 MMOL/L (ref 98–107)
CO2 SERPL-SCNC: 23 MMOL/L (ref 20–31)
CREAT SERPL-MCNC: 0.7 MG/DL (ref 0.7–1.2)
EOSINOPHIL # BLD: 0 K/UL (ref 0–0.44)
EOSINOPHILS RELATIVE PERCENT: 0 % (ref 1–4)
ERYTHROCYTE [DISTWIDTH] IN BLOOD BY AUTOMATED COUNT: 11.8 % (ref 11.8–14.4)
GFR, ESTIMATED: >90 ML/MIN/1.73M2
GLUCOSE SERPL-MCNC: 148 MG/DL (ref 74–99)
HCT VFR BLD AUTO: 41.1 % (ref 40.7–50.3)
HGB BLD-MCNC: 14.4 G/DL (ref 13–17)
IMM GRANULOCYTES # BLD AUTO: 0.19 K/UL (ref 0–0.3)
IMM GRANULOCYTES NFR BLD: 1 %
LYMPHOCYTES NFR BLD: 0.57 K/UL (ref 1.1–3.7)
LYMPHOCYTES RELATIVE PERCENT: 3 % (ref 24–43)
MCH RBC QN AUTO: 33.1 PG (ref 25.2–33.5)
MCHC RBC AUTO-ENTMCNC: 35 G/DL (ref 28.4–34.8)
MCV RBC AUTO: 94.5 FL (ref 82.6–102.9)
MONOCYTES NFR BLD: 0.19 K/UL (ref 0.1–1.2)
MONOCYTES NFR BLD: 1 % (ref 3–12)
MORPHOLOGY: NORMAL
MRSA, DNA, NASAL: NEGATIVE
NEUTROPHILS NFR BLD: 95 % (ref 36–65)
NEUTS SEG NFR BLD: 18.15 K/UL (ref 1.5–8.1)
NRBC BLD-RTO: 0 PER 100 WBC
PLATELET # BLD AUTO: 281 K/UL (ref 138–453)
PMV BLD AUTO: 10 FL (ref 8.1–13.5)
POTASSIUM SERPL-SCNC: 4.3 MMOL/L (ref 3.7–5.3)
RBC # BLD AUTO: 4.35 M/UL (ref 4.21–5.77)
SODIUM SERPL-SCNC: 138 MMOL/L (ref 136–145)
SPECIMEN DESCRIPTION: NORMAL
WBC OTHER # BLD: 19.1 K/UL (ref 3.5–11.3)

## 2024-05-17 PROCEDURE — 87040 BLOOD CULTURE FOR BACTERIA: CPT

## 2024-05-17 PROCEDURE — 99231 SBSQ HOSP IP/OBS SF/LOW 25: CPT | Performed by: OTOLARYNGOLOGY

## 2024-05-17 PROCEDURE — 36415 COLL VENOUS BLD VENIPUNCTURE: CPT

## 2024-05-17 PROCEDURE — 6360000002 HC RX W HCPCS: Performed by: STUDENT IN AN ORGANIZED HEALTH CARE EDUCATION/TRAINING PROGRAM

## 2024-05-17 PROCEDURE — 2500000003 HC RX 250 WO HCPCS: Performed by: STUDENT IN AN ORGANIZED HEALTH CARE EDUCATION/TRAINING PROGRAM

## 2024-05-17 PROCEDURE — 31575 DIAGNOSTIC LARYNGOSCOPY: CPT | Performed by: OTOLARYNGOLOGY

## 2024-05-17 PROCEDURE — 1200000000 HC SEMI PRIVATE

## 2024-05-17 PROCEDURE — 2580000003 HC RX 258: Performed by: STUDENT IN AN ORGANIZED HEALTH CARE EDUCATION/TRAINING PROGRAM

## 2024-05-17 PROCEDURE — 80048 BASIC METABOLIC PNL TOTAL CA: CPT

## 2024-05-17 PROCEDURE — 85025 COMPLETE CBC W/AUTO DIFF WBC: CPT

## 2024-05-17 PROCEDURE — A4216 STERILE WATER/SALINE, 10 ML: HCPCS | Performed by: STUDENT IN AN ORGANIZED HEALTH CARE EDUCATION/TRAINING PROGRAM

## 2024-05-17 PROCEDURE — 99291 CRITICAL CARE FIRST HOUR: CPT | Performed by: INTERNAL MEDICINE

## 2024-05-17 PROCEDURE — 6370000000 HC RX 637 (ALT 250 FOR IP)

## 2024-05-17 RX ORDER — AMOXICILLIN AND CLAVULANATE POTASSIUM 875; 125 MG/1; MG/1
1 TABLET, FILM COATED ORAL EVERY 12 HOURS SCHEDULED
Status: DISCONTINUED | OUTPATIENT
Start: 2024-05-17 | End: 2024-05-17

## 2024-05-17 RX ORDER — AMOXICILLIN AND CLAVULANATE POTASSIUM 875; 125 MG/1; MG/1
1 TABLET, FILM COATED ORAL EVERY 12 HOURS SCHEDULED
Status: DISCONTINUED | OUTPATIENT
Start: 2024-05-17 | End: 2024-05-18 | Stop reason: HOSPADM

## 2024-05-17 RX ADMIN — DIPHENHYDRAMINE HYDROCHLORIDE 25 MG: 50 INJECTION INTRAMUSCULAR; INTRAVENOUS at 05:09

## 2024-05-17 RX ADMIN — SODIUM CHLORIDE, PRESERVATIVE FREE 10 ML: 5 INJECTION INTRAVENOUS at 16:41

## 2024-05-17 RX ADMIN — ENOXAPARIN SODIUM 30 MG: 100 INJECTION SUBCUTANEOUS at 09:04

## 2024-05-17 RX ADMIN — SODIUM CHLORIDE, PRESERVATIVE FREE 10 ML: 5 INJECTION INTRAVENOUS at 09:08

## 2024-05-17 RX ADMIN — SODIUM CHLORIDE, PRESERVATIVE FREE 10 ML: 5 INJECTION INTRAVENOUS at 09:07

## 2024-05-17 RX ADMIN — FAMOTIDINE 20 MG: 10 INJECTION, SOLUTION INTRAVENOUS at 09:00

## 2024-05-17 RX ADMIN — AMOXICILLIN AND CLAVULANATE POTASSIUM 1 TABLET: 875; 125 TABLET, FILM COATED ORAL at 21:07

## 2024-05-17 RX ADMIN — DIPHENHYDRAMINE HYDROCHLORIDE 25 MG: 50 INJECTION INTRAMUSCULAR; INTRAVENOUS at 16:40

## 2024-05-17 RX ADMIN — DIPHENHYDRAMINE HYDROCHLORIDE 25 MG: 50 INJECTION INTRAMUSCULAR; INTRAVENOUS at 11:05

## 2024-05-17 RX ADMIN — DIPHENHYDRAMINE HYDROCHLORIDE 25 MG: 50 INJECTION INTRAMUSCULAR; INTRAVENOUS at 20:37

## 2024-05-17 RX ADMIN — SODIUM CHLORIDE, PRESERVATIVE FREE 10 ML: 5 INJECTION INTRAVENOUS at 11:06

## 2024-05-17 RX ADMIN — ENOXAPARIN SODIUM 30 MG: 100 INJECTION SUBCUTANEOUS at 20:04

## 2024-05-17 RX ADMIN — SODIUM CHLORIDE, PRESERVATIVE FREE 10 ML: 5 INJECTION INTRAVENOUS at 20:05

## 2024-05-17 RX ADMIN — FAMOTIDINE 20 MG: 10 INJECTION, SOLUTION INTRAVENOUS at 20:04

## 2024-05-17 RX ADMIN — WATER 60 MG: 1 INJECTION INTRAMUSCULAR; INTRAVENOUS; SUBCUTANEOUS at 05:07

## 2024-05-17 ASSESSMENT — PAIN DESCRIPTION - ORIENTATION: ORIENTATION: OTHER (COMMENT)

## 2024-05-17 ASSESSMENT — PAIN SCALES - GENERAL
PAINLEVEL_OUTOF10: 0
PAINLEVEL_OUTOF10: 3
PAINLEVEL_OUTOF10: 0

## 2024-05-17 ASSESSMENT — PAIN DESCRIPTION - DESCRIPTORS: DESCRIPTORS: ACHING

## 2024-05-17 ASSESSMENT — PAIN DESCRIPTION - LOCATION: LOCATION: HEAD

## 2024-05-17 NOTE — PLAN OF CARE
ENT performed laryngoscopy at bedside in ICU.  Per ENT, majority of tongue swelling is anterior.  Minimal base of tongue swelling. ENT confirms this is a difficult airway and will require ENT/Anesthesia for intubation if needed, however for now the patient is managing his secretions well and is in no apparent distress with unlabored respirations.  ENT is recommending monitoring in the ICU very closely.  They do not feel that the patient requires OR for intubation at this time.  Extremely low threshold to take to the OR for intubation, however at this time we will continue to monitor.  The patient specifically is saying that he feels like his tongue swelling is improving.   Myself, MICU nursing staff, ENT physician, and ENT NP all present for the discussion at bedside in the MICU.  14-gauge Angiocath being kept at bedside.  Will continue to monitor closely.      Lonnie Ballard, DO  PGY-2 Emergency Medicine  [facility]   5/16/2024, 10:54 PM

## 2024-05-17 NOTE — CONSULTS
CONSULTING SERVICE: Otolaryngology-Head and Neck Surgery    Informant:   The history was obtained from chart review, the patient, and primary team.    Chief Complaint:   His chief complaint is tongue swelling    History of Present Illness:   Jaya Staley is a 53 y.o. male seen consultation at the request of Nellie Celeste on 5/16/2024.      Jaya Staley is a 53 y.o. male with history of oral cancer status post surgery who presents with tongue swelling when he woke up this morning.  Patient has never had anything happen like this before.  He denies any new medications or tube feeds.  He does not take anything by mouth.  All nutrition is through his PEG tube.  He denies any antihypertensive medication such as lisinopril or any other ACE inhibitor's.  He denies being stung by bee.  No known allergies.  He denies any chest pain or shortness of breath.  No nausea or vomiting.     In the ER he received Pepcid, Benadryl, Decadron, IM epinephrine     Upon receiving [employer] message forwarded to Dr. Negro and went to see patient in ER, but had already moved on to [id number].  Jaya has no family history of angioedema.  He has obvious head and neck anatomy abnormalities with a markedly swollen tongue and muffled voice.    Not on any ACE inhibitors.   Had a concurrent L leg pain and headache when his tongue swelling began.   Noticed when he awoke but did not seem to progress.       Regarding the oral cavity cancer, he underwent segmental mandibulectomy, neck dissection, floor of mouth resection, and left fibular free flap reconstruction prior to the adjuvant treatment in 2008.  In February 2024, patient underwent direct laryngoscopy with removal of mandibular hardware by ENT at [facility]. He is awake and alert.  Garbled speech but patient states it is much better than it was before treatment and almost back to his baseline. He  is managing secretions without any difficulty.  Denies any respiratory

## 2024-05-17 NOTE — CARE COORDINATION
Case Management Assessment  Initial Evaluation    Date/Time of Evaluation: 5/17/2024 2:25 PM  Assessment Completed by: ROSENDO BENITES    If patient is discharged prior to next notation, then this note serves as note for discharge by case management.    Patient Name: Jaya Staley                   YOB: 1970  Diagnosis: Tongue swelling [R22.0]  Angioedema of tongue [T78.3XXA]                   Date / Time: 5/16/2024  5:26 PM    Patient Admission Status: Inpatient   Readmission Risk (Low < 19, Mod (19-27), High > 27): Readmission Risk Score: 8.8    Current PCP: Kay Warner, QIAN - CNP  PCP verified by CM? (P) Yes    Chart Reviewed: Yes      History Provided by: (P) Patient  Patient Orientation: (P) Alert and Oriented, Person, Place, Situation, Self    Patient Cognition: (P) Alert    Hospitalization in the last 30 days (Readmission):  No    If yes, Readmission Assessment in CM Navigator will be completed.    Advance Directives:      Code Status: Full Code   Patient's Primary Decision Maker is: (P) Legal Next of Kin    Primary Decision Maker: Jaya Staley Sr - Srikanth - [phone]    Discharge Planning:    Patient lives with: (P) Alone Type of Home: (P) Apartment  Primary Care Giver: (P) Self  Patient Support Systems include: (P) Parent   Current Financial resources: (P) Medicaid  Current community resources: (P) None  Current services prior to admission: (P) None            Current DME:              Type of Home Care services:  (P) None    ADLS  Prior functional level: (P) Independent in ADLs/IADLs  Current functional level: (P) Independent in ADLs/IADLs    PT AM-PAC:   /24  OT AM-PAC:   /24    Family can provide assistance at DC: (P) Yes  Would you like Case Management to discuss the discharge plan with any other family members/significant others, and if so, who? (P) No  Plans to Return to Present Housing: (P) Yes  Other Identified Issues/Barriers to RETURNING to current housing: medical

## 2024-05-17 NOTE — H&P
Critical Care - History and Physical Examination    Patient's name:  Jaya Staley  Medical Record Number: [id number]  Patient's account/billing number: [id number]  Patient's YOB: 1970  Age: 53 y.o.  Date of Admission: 5/16/2024  5:26 PM  Reason of ICU admission:   Date of History and Physical Examination: 5/16/2024      Primary Care Physician: Kay Warner APRN - CNP  Attending Physician:    Code Status: Full Code    Chief complaint:     Reason for ICU admission:       History Of Present Illness:   History was obtained from chart review and the patient.      Jaya Staley is a 53 y.o. 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  Patient has never experienced angioedema before and is not on an ACE inhibitor.  No family history of angioedema.  He has obvious head and neck anatomy abnormalities with a markedly swollen tongue and muffled voice.  Regarding the oral cavity cancer, he underwent segmental mandibulectomy, neck dissection, floor of mouth resection, and left fibular free flap reconstruction prior to the adjuvant treatment in 2008.  In February 2024, patient underwent direct laryngoscopy with removal of mandibular hardware by ENT at [facility].   Tonight, he is not in respiratory distress at this time, however is experiencing drooling.  Patient woke up this morning with tongue swelling and difficulty speaking.  He tells me that he was mainly \"breathing through his nose\" this morning.  He is unsure as to why this happened and has never had this happen before.  Not on ACE inhibitor, no known allergies, no recent insect bites or bee stings.  He has not experienced abdominal pain, nausea, vomiting, diarrhea.  Denies chest pain and shortness of breath.  In the ED he is sitting up resting comfortably and managing  taken to OR and intubated in a controlled environment.  ENT would like to perform bedside laryngoscopy, which will guide further management  CT soft tissue neck in the ED showed dystrophic appearing calcification within the tongue, nasopharynx and oropharynx within normal limits, thickening of the epiglottis s/p radiation, hypopharynx and upper esophagus grossly within normal limits, s/p bilateral lymph node dissection as well as partial mandibulectomy with reconstruction  S/p Benadryl, Pepcid, Decadron, and IM epinephrine in ED without improvement of his symptoms  Scheduled IV Benadryl, Pepcid, and Solu-Medrol here in the ICU  Icatibant.  Discussed case with pharmacy  Checking C1 esterase inhibitor panel  Unclear etiology of the angioedema.  No trauma, history of angioedema, family history of angioedema, or ACE inhibitor use.  No insect bites or bee stings.  No allergies  Not in respiratory distress.  Managing secretions at this time.  Clear breath sounds bilaterally  Continue to monitor closely. Low threshold for OR to intubate for airway protection. If needs emergent intubation while in the ICU, then will be a DART alert. Keep 14 gauge angiocatheter at bedside.    CV:  HD stable. Not on pressors  Not tachycardic    GI/Nutrition:  PEG tube dependent  Strict NPO    /Fluids/Electrolytes:  BMP in ED unremarkable aside from elevated blood glucose of 163.  Unremarkable renal function and electrolytes    Heme:  Hemoglobin 15.7  No signs of active bleeding    ID:  Leukocytosis of 19.8 however afebrile and nontoxic-appearing.  Leukocytosis is likely secondary to steroid use in the emergency department.  Will obtain cultures and pursue septic workup if needed, however at this time I do not feel that septic workup or antibiotics are needed    Endo:  Hyperglycemia 163 in the ED. No records of DM and not on diabetic medication      Prophylaxis:  DVT: Lovenox  GI: IV Pepcid 20 mg twice daily (receiving for

## 2024-05-17 NOTE — PLAN OF CARE
Problem: Discharge Planning  Goal: Discharge to home or other facility with appropriate resources  Outcome: Progressing     Problem: Skin/Tissue Integrity - Adult  Goal: Skin integrity remains intact  Outcome: Progressing  Flowsheets (Taken 5/17/2024 0900)  Skin Integrity Remains Intact:   Monitor for areas of redness and/or skin breakdown   Assess vascular access sites hourly     Problem: Musculoskeletal - Adult  Goal: Return mobility to safest level of function  Outcome: Progressing  Flowsheets (Taken 5/17/2024 0900)  Return Mobility to Safest Level of Function: Assess patient stability and activity tolerance for standing, transferring and ambulating with or without assistive devices     Problem: Gastrointestinal - Adult  Goal: Maintains or returns to baseline bowel function  Outcome: Progressing  Flowsheets (Taken 5/17/2024 0900)  Maintains or returns to baseline bowel function:   Assess bowel function   Encourage mobilization and activity  Goal: Maintains adequate nutritional intake  Outcome: Progressing     Problem: Pain  Goal: Verbalizes/displays adequate comfort level or baseline comfort level  Outcome: Progressing     Problem: Nutrition Deficit:  Goal: Optimize nutritional status  Outcome: Progressing

## 2024-05-17 NOTE — PROGRESS NOTES
INTENSIVE CARE UNIT  Resident Physician Progress Note    Patient - Jaya Staley  Date of Admission -  5/16/2024  5:26 PM  Date of Evaluation -  5/17/2024  Room and Bed Number -  3008/3008-01   Hospital Day - 1    SUBJECTIVE:     OVERNIGHT EVENTS:      NAEON    TODAY:    Hemodynamically stable, afebrile  On room air, maintaining secretion  WBC 19   Glu 148 s/p decardon 10 X1, solumedrol 60 X1      HISTORY OF PRESENT ILLNESS:    Jaya Staley is a 53 y.o. 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  Patient has never experienced angioedema before and is not on an ACE inhibitor.  No family history of angioedema.  He has obvious head and neck anatomy abnormalities with a markedly swollen tongue and muffled voice.  Regarding the oral cavity cancer, he underwent segmental mandibulectomy, neck dissection, floor of mouth resection, and left fibular free flap reconstruction prior to the adjuvant treatment in 2008.  In February 2024, patient underwent direct laryngoscopy with removal of mandibular hardware by ENT at [facility].   Tonight, he is not in respiratory distress at this time, however is experiencing drooling.  Patient woke up with tongue swelling and difficulty speaking.  He tells that he was mainly \"breathing through his nose\" this morning.  He is unsure as to why this happened and has never had this happen before.  Not on ACE inhibitor, no known allergies, no recent insect bites or bee stings.  He has not experienced abdominal pain, nausea, vomiting, diarrhea.  Denies chest pain and shortness of breath.  In the ED he is sitting up resting comfortably and managing his secretions, although his tongue is mildly protruding from his mouth.  While in the ED, he was given Pepcid, Benadryl, Decadron, and IM epinephrine.  Unfortunately these

## 2024-05-17 NOTE — PROGRESS NOTES
ENT/OTOLARYNGOLOGY SUBSEQUENT CARE PROGRESS NOTE     REASON FOR CARE: f/u progres     HISTORY OF PRESENT ILLNESS:   Jaya Staley is a 53 y.o. who is being seen for follow-up of tongue swelling.  No issues overnight.   Breathing okay.  Feels that tongue swelling is slowly improving.  Feels that speech is slightly improved overnight.         Pertinent Examination:   GENERAL: well developed and well nourished and in no acute distress. Comfortable and slightly reclined.    HEAD: normocephalic and atraumatic  EYES: no eyelid swelling, no conjunctival injection or exudate, pupils equal round and reactive to light  EXTERNAL EARS: normal  EAR EXAM: deferred    NOSE: nares patent, normal mucosa    MOUTH/THROAT: mucous membranes moist, no focal lesions, no tonsillar enlargement or exudate.  Tongue slightly improved in swelling overnight but similar in appearance.      NECK: non-tender, woody induration.  No fluctuance or masses.     RESPIRATORY: Normal expansion.  Clear to auscultation.  No rales, rhonchi, or wheezing.    NEUROLOGICAL:  cranial nerves II-XII are grossly intact.   Slightly improved dysarthria this am     RELEVANT LABS/STUDIES:   Additional data reviewed:    None    Procedures:    None    Surgical risk factors:  None     IMPRESSION AND RECOMMENDATIONS:   Jaya Staley is a 53 y.o. male with tongue swelling.   Slightly improved overnight.   Improved dysarthria.  No respiratory issues.  H/o SCCA floor of mouth, s/p segmental mandibulectomy and XRT about 15 years ago.      Plan:  Continue care per ICU/internal medicine.   Improving tongue swelling and speech.   Stable respiratory status.  No stridor/increased WOB/breathing issues.   Patient completely comfortable.      Contact ENT if any acute worsening.         --------------------------------------------------  APOLINAR GLEZ MD  Pediatric Otolaryngology-Head and Neck Surgery  [facility] Otolaryngology

## 2024-05-17 NOTE — CONSULTS
Comprehensive Nutrition Assessment    Type and Reason for Visit:  Initial, Consult (PEG tube dependent - Tube Feedings)    Nutrition Recommendations/Plan:   Start bolus feedings using in-house TF equivalent of TwoCal HN.  Suggest bolus feedings of 500 mL x 3 per day along with free water flushes of 100 mL before and after each bolus.    Monitor TF tolerance/adequacy of intakes - modify as needed.  Will monitor labs, weights, and plan of care.     Malnutrition Assessment:  Malnutrition Status:  At risk for malnutrition (05/17/24 0901)    Context:  Acute Illness     Findings of the 6 clinical characteristics of malnutrition:  Energy Intake:  Mild decrease in energy intake   Weight Loss:  No significant weight loss     Body Fat Loss:  No significant body fat loss   Muscle Mass Loss:  No significant muscle mass loss  Fluid Accumulation:  No significant fluid accumulation    Strength:  Not Performed    Nutrition Assessment:    Pt admitted with angioedema of tongue.  Pt G-tube dependent with h/o oral cancer s/p resection and mandible reconstruction.  Pt reports at home taking TF of 6 cartons of Nutren per day - gives feedings 3 times per day around breakfast, lunch, and dinner.  Discussed free water flushes - unsure of how much before and after.  Discussed starting TF using in-house equivalent formula of TwoCal HN.  Pt reports he has been on TF x past 15 years.  Reports amount of feedings had been increased as pt was losing weight.  Pt reports gaining weight.  Labs/Meds reviewed.    Nutrition Related Findings:    Labs/Meds reviewed. Wound Type: None       Current Nutrition Intake & Therapies:    Average Meal Intake: NPO  Average Supplements Intake: NPO  Diet NPO  ADULT TUBE FEEDING; PEG; 2.0 Calorie; Bolus; 3 Times Daily; 500; Syringe Push; 100; Before and after each bolus  Additional Calorie Sources:  None    Anthropometric Measures:  Height: 170.2 cm (5' 7.01\")  Ideal Body Weight (IBW): 148 lbs (67 kg)    Admission  Body Weight: 76.3 kg (168 lb 3.4 oz)  Current Body Weight: 77 kg (169 lb 12.1 oz), 114.7 % IBW. Weight Source: Lift Scale  Current BMI (kg/m2): 26.6        Weight Adjustment For: No Adjustment                 BMI Categories: Overweight (BMI 25.0-29.9)    Estimated Daily Nutrient Needs:  Energy Requirements Based On: Kcal/kg  Weight Used for Energy Requirements: Admission  Energy (kcal/day): 5453-5437 kcals/day  Weight Used for Protein Requirements: Admission  Protein (g/day):  gm pro/day  Method Used for Fluid Requirements: ml/Kg  Fluid (ml/day): 25 mL/kg = 1517-6681 mL/day or per MD    Nutrition Diagnosis:   Inadequate oral intake related to swallowing difficulty (h/o oral cancer) as evidenced by NPO or clear liquid status due to medical condition, nutrition support - enteral nutrition    Nutrition Interventions:   Food and/or Nutrient Delivery: Start Tube Feeding  Nutrition Education/Counseling: No recommendation at this time  Coordination of Nutrition Care: Continue to monitor while inpatient  Plan of Care discussed with: Patient; RN    Goals:  Previous Goal Met:  (Goal Set)  Goals: Tolerate nutrition support at goal rate, by next RD assessment       Nutrition Monitoring and Evaluation:   Behavioral-Environmental Outcomes: None Identified  Food/Nutrient Intake Outcomes: Enteral Nutrition Intake/Tolerance  Physical Signs/Symptoms Outcomes: Biochemical Data, GI Status, Fluid Status or Edema, Weight, Skin, Nutrition Focused Physical Findings, Hemodynamic Status    Discharge Planning:    Enteral Nutrition     Alisa Gilliland RD, LD  Contact: [phone]

## 2024-05-18 VITALS
SYSTOLIC BLOOD PRESSURE: 121 MMHG | RESPIRATION RATE: 16 BRPM | DIASTOLIC BLOOD PRESSURE: 68 MMHG | WEIGHT: 170.3 LBS | BODY MASS INDEX: 26.73 KG/M2 | HEART RATE: 102 BPM | TEMPERATURE: 98.1 F | OXYGEN SATURATION: 95 % | HEIGHT: 67 IN

## 2024-05-18 LAB
ANION GAP SERPL CALCULATED.3IONS-SCNC: 11 MMOL/L (ref 9–16)
BASOPHILS # BLD: <0.03 K/UL (ref 0–0.2)
BASOPHILS NFR BLD: 0 % (ref 0–2)
BUN SERPL-MCNC: 21 MG/DL (ref 6–20)
C1INH SERPL-MCNC: 34 MG/DL (ref 21–38)
CALCIUM SERPL-MCNC: 8.9 MG/DL (ref 8.6–10.4)
CHLORIDE SERPL-SCNC: 105 MMOL/L (ref 98–107)
CO2 SERPL-SCNC: 24 MMOL/L (ref 20–31)
CREAT SERPL-MCNC: 0.7 MG/DL (ref 0.7–1.2)
EOSINOPHIL # BLD: <0.03 K/UL (ref 0–0.44)
EOSINOPHILS RELATIVE PERCENT: 0 % (ref 1–4)
ERYTHROCYTE [DISTWIDTH] IN BLOOD BY AUTOMATED COUNT: 12.1 % (ref 11.8–14.4)
GFR, ESTIMATED: >90 ML/MIN/1.73M2
GLUCOSE SERPL-MCNC: 89 MG/DL (ref 74–99)
HCT VFR BLD AUTO: 43 % (ref 40.7–50.3)
HGB BLD-MCNC: 14.8 G/DL (ref 13–17)
IMM GRANULOCYTES # BLD AUTO: 0.11 K/UL (ref 0–0.3)
IMM GRANULOCYTES NFR BLD: 1 %
LYMPHOCYTES NFR BLD: 1.54 K/UL (ref 1.1–3.7)
LYMPHOCYTES RELATIVE PERCENT: 8 % (ref 24–43)
MCH RBC QN AUTO: 32.7 PG (ref 25.2–33.5)
MCHC RBC AUTO-ENTMCNC: 34.4 G/DL (ref 28.4–34.8)
MCV RBC AUTO: 95.1 FL (ref 82.6–102.9)
MONOCYTES NFR BLD: 1.4 K/UL (ref 0.1–1.2)
MONOCYTES NFR BLD: 8 % (ref 3–12)
NEUTROPHILS NFR BLD: 83 % (ref 36–65)
NEUTS SEG NFR BLD: 15.48 K/UL (ref 1.5–8.1)
NRBC BLD-RTO: 0 PER 100 WBC
PLATELET # BLD AUTO: 265 K/UL (ref 138–453)
PMV BLD AUTO: 10.4 FL (ref 8.1–13.5)
POTASSIUM SERPL-SCNC: 4.7 MMOL/L (ref 3.7–5.3)
RBC # BLD AUTO: 4.52 M/UL (ref 4.21–5.77)
SODIUM SERPL-SCNC: 140 MMOL/L (ref 136–145)
WBC OTHER # BLD: 18.6 K/UL (ref 3.5–11.3)

## 2024-05-18 PROCEDURE — 80048 BASIC METABOLIC PNL TOTAL CA: CPT

## 2024-05-18 PROCEDURE — A4216 STERILE WATER/SALINE, 10 ML: HCPCS | Performed by: STUDENT IN AN ORGANIZED HEALTH CARE EDUCATION/TRAINING PROGRAM

## 2024-05-18 PROCEDURE — 36415 COLL VENOUS BLD VENIPUNCTURE: CPT

## 2024-05-18 PROCEDURE — 6360000002 HC RX W HCPCS: Performed by: STUDENT IN AN ORGANIZED HEALTH CARE EDUCATION/TRAINING PROGRAM

## 2024-05-18 PROCEDURE — 85025 COMPLETE CBC W/AUTO DIFF WBC: CPT

## 2024-05-18 PROCEDURE — 99231 SBSQ HOSP IP/OBS SF/LOW 25: CPT | Performed by: OTOLARYNGOLOGY

## 2024-05-18 PROCEDURE — 2580000003 HC RX 258: Performed by: STUDENT IN AN ORGANIZED HEALTH CARE EDUCATION/TRAINING PROGRAM

## 2024-05-18 PROCEDURE — 6370000000 HC RX 637 (ALT 250 FOR IP)

## 2024-05-18 PROCEDURE — 2500000003 HC RX 250 WO HCPCS: Performed by: STUDENT IN AN ORGANIZED HEALTH CARE EDUCATION/TRAINING PROGRAM

## 2024-05-18 PROCEDURE — 99233 SBSQ HOSP IP/OBS HIGH 50: CPT | Performed by: INTERNAL MEDICINE

## 2024-05-18 RX ORDER — PREDNISONE 50 MG/1
50 TABLET ORAL DAILY
Qty: 3 TABLET | Refills: 0 | Status: SHIPPED | OUTPATIENT
Start: 2024-05-18 | End: 2024-05-18 | Stop reason: HOSPADM

## 2024-05-18 RX ORDER — AMOXICILLIN AND CLAVULANATE POTASSIUM 875; 125 MG/1; MG/1
1 TABLET, FILM COATED ORAL EVERY 12 HOURS SCHEDULED
Qty: 10 TABLET | Refills: 0 | Status: SHIPPED | OUTPATIENT
Start: 2024-05-18 | End: 2024-05-23

## 2024-05-18 RX ORDER — PREDNISONE 50 MG/1
50 TABLET ORAL DAILY
Qty: 3 TABLET | Refills: 0 | Status: SHIPPED | OUTPATIENT
Start: 2024-05-19 | End: 2024-05-22

## 2024-05-18 RX ORDER — SENNOSIDES 8.8 MG/5ML
5 LIQUID ORAL 2 TIMES DAILY
Qty: 300 ML | Refills: 0 | Status: SHIPPED | OUTPATIENT
Start: 2024-05-18 | End: 2024-06-17

## 2024-05-18 RX ORDER — AMOXICILLIN AND CLAVULANATE POTASSIUM 875; 125 MG/1; MG/1
1 TABLET, FILM COATED ORAL 2 TIMES DAILY
Qty: 10 TABLET | Refills: 0 | Status: SHIPPED | OUTPATIENT
Start: 2024-05-18 | End: 2024-05-18 | Stop reason: HOSPADM

## 2024-05-18 RX ADMIN — ENOXAPARIN SODIUM 30 MG: 100 INJECTION SUBCUTANEOUS at 09:19

## 2024-05-18 RX ADMIN — DIPHENHYDRAMINE HYDROCHLORIDE 25 MG: 50 INJECTION INTRAMUSCULAR; INTRAVENOUS at 04:45

## 2024-05-18 RX ADMIN — PREDNISONE 50 MG: 20 TABLET ORAL at 09:01

## 2024-05-18 RX ADMIN — FAMOTIDINE 20 MG: 10 INJECTION, SOLUTION INTRAVENOUS at 09:01

## 2024-05-18 RX ADMIN — SODIUM CHLORIDE, PRESERVATIVE FREE 10 ML: 5 INJECTION INTRAVENOUS at 09:17

## 2024-05-18 RX ADMIN — AMOXICILLIN AND CLAVULANATE POTASSIUM 1 TABLET: 875; 125 TABLET, FILM COATED ORAL at 09:01

## 2024-05-18 RX ADMIN — DIPHENHYDRAMINE HYDROCHLORIDE 25 MG: 50 INJECTION INTRAMUSCULAR; INTRAVENOUS at 11:14

## 2024-05-18 RX ADMIN — SODIUM CHLORIDE, PRESERVATIVE FREE 10 ML: 5 INJECTION INTRAVENOUS at 09:18

## 2024-05-18 RX ADMIN — SODIUM CHLORIDE, PRESERVATIVE FREE 10 ML: 5 INJECTION INTRAVENOUS at 11:15

## 2024-05-18 ASSESSMENT — PAIN SCALES - GENERAL: PAINLEVEL_OUTOF10: 0

## 2024-05-18 NOTE — FLOWSHEET NOTE
Discharge instructions reviewed with patient.  Patient verbalized understanding.  All questions answered.  Patient transported via wheelchair to ED parking lot by GABRIELA Márquez, without incident to obtain patient's car.  Patient ambulated without difficulty prior to discharge.  Patient discharged home.        Electronically signed by SACHA DELEON RN on 5/18/2024 at 12:38 PM

## 2024-05-18 NOTE — DISCHARGE INSTRUCTIONS
You were admitted to the ICU for angioedema, the swelling of your tongue, You were treated while in the hospital. Pleae continue your medications at home.      If you begin to experience any symptoms such as chest pain shortness of breath nausea vomiting dizziness drowsiness abdominal pain loss of consciousness or any other symptoms you find concerning please come to the ED for follow-up evaluation.    If you have been given medication please take them as prescribed. Do not take more medication than recommended at any given time.     Please follow-up with your primary care provider within 5 to 7 days for continued care.     Please feel free return to the hospital if your symptoms worsen or any new concerning symptoms develop.  Follow-up with your primary care physician as needed for all other the concerns.

## 2024-05-18 NOTE — PLAN OF CARE
Problem: Discharge Planning  Goal: Discharge to home or other facility with appropriate resources  5/18/2024 1135 by Edyta More RN  Outcome: Completed  5/18/2024 1122 by Edyta More RN  Outcome: Adequate for Discharge     Problem: Skin/Tissue Integrity - Adult  Goal: Skin integrity remains intact  5/18/2024 1135 by Edyta More RN  Outcome: Completed  5/18/2024 1122 by Edyta More RN  Outcome: Adequate for Discharge  Flowsheets (Taken 5/18/2024 0854)  Skin Integrity Remains Intact:   Monitor for areas of redness and/or skin breakdown   Assess vascular access sites hourly     Problem: Musculoskeletal - Adult  Goal: Return mobility to safest level of function  5/18/2024 1135 by Edyta More RN  Outcome: Completed  5/18/2024 1122 by Edyta More RN  Outcome: Adequate for Discharge  Flowsheets (Taken 5/18/2024 0854)  Return Mobility to Safest Level of Function: Assess patient stability and activity tolerance for standing, transferring and ambulating with or without assistive devices     Problem: Gastrointestinal - Adult  Goal: Maintains or returns to baseline bowel function  5/18/2024 1135 by Edyta More RN  Outcome: Completed  5/18/2024 1122 by Edyta More RN  Outcome: Adequate for Discharge  Flowsheets (Taken 5/18/2024 0854)  Maintains or returns to baseline bowel function: Assess bowel function  Goal: Maintains adequate nutritional intake  5/18/2024 1135 by Edyta More RN  Outcome: Completed  5/18/2024 1122 by Edyta More RN  Outcome: Adequate for Discharge     Problem: Pain  Goal: Verbalizes/displays adequate comfort level or baseline comfort level  5/18/2024 1135 by Edyta More RN  Outcome: Completed  5/18/2024 1122 by Edyta More RN  Outcome: Adequate for Discharge     Problem: Nutrition Deficit:  Goal: Optimize nutritional status  5/18/2024 1135 by Edyta More RN  Outcome: Completed  5/18/2024 1122 by Edyta More RN  Outcome:  Adequate for Discharge

## 2024-05-18 NOTE — PLAN OF CARE
Problem: Discharge Planning  Goal: Discharge to home or other facility with appropriate resources  5/17/2024 2049 by Cynthia Khan RN  Outcome: Progressing     Problem: Skin/Tissue Integrity - Adult  Goal: Skin integrity remains intact  5/17/2024 2049 by Cynthia Khan RN  Outcome: Progressing     Problem: Musculoskeletal - Adult  Goal: Return mobility to safest level of function  5/17/2024 2049 by Cynthia Khan RN  Outcome: Progressing     Problem: Gastrointestinal - Adult  Goal: Maintains or returns to baseline bowel function  5/17/2024 2049 by Cynthia Khan RN  Outcome: Progressing     Problem: Gastrointestinal - Adult  Goal: Maintains adequate nutritional intake  5/17/2024 2049 by Cynthia Khan RN  Outcome: Progressing     Problem: Pain  Goal: Verbalizes/displays adequate comfort level or baseline comfort level  5/17/2024 2049 by Cynthia Khan RN  Outcome: Progressing     Problem: Nutrition Deficit:  Goal: Optimize nutritional status  5/17/2024 2049 by Cynthia Khan RN  Outcome: Progressing

## 2024-05-18 NOTE — DISCHARGE INSTR - DIET
Good nutrition is important when healing from an illness, injury, or surgery.  Follow any nutrition recommendations given to you during your hospital stay.   If you were given an oral nutrition supplement while in the hospital, continue to take this supplement at home.  You can take it with meals, in-between meals, and/or before bedtime. These supplements can be purchased at most local grocery stores, pharmacies, and chain [employer]-stores.   If you have any questions about your diet or nutrition, call the hospital and ask for the dietitian.    Nothing by mouth.    Resume home tubefeeding boluses:  Neutron 2.0, two cartons per meal three times daily for a total of 6 cartons per day.

## 2024-05-18 NOTE — CARE COORDINATION
Transitional Planning  Plan is to return home, has transportation.  No identified needs. Verified with RN.    1230  Discharge Report    [facility]  Clinical Case Management Department  Written by: Mari Esparza    Patient Name: Jaya Luís  Attending Provider: No att. providers found  Admit Date: 2024  5:26 PM  MRN: [id number]  Account: [id number]                     : 1970  Discharge Date: 2024      Disposition: home    Mari Esparza

## 2024-05-18 NOTE — DISCHARGE SUMMARY
[facility]     Department of Internal Medicine - Critical Care Service    INPATIENT DISCHARGE SUMMARY    Patient's Name: Jaya Staley  Patient's YOB: 1970  Age / Sex: 53 y.o. male  Medical Record Number: [id number]  Patient's Acct/billing number: [id number]    Date of Admission: 5/16/2024  5:26 PM  Length of Stay: 2 Days  Date of Discharge: 5/18/2024     Primary Care Physician: Kay Warner APRN - CNP  Attending Physician: Joey Kim MD      Code Status: Full Code     Date and Time: 5/18/2024 9:46 AM    HOSPITAL PROBLEMS:     Principal Problem:    Angioedema of tongue  Active Problems:    Tongue swelling  Resolved Problems:    * No resolved hospital problems. *       REASON FOR HOSPITALIZATION:   Chief Complaint   Patient presents with    Oral Swelling          Hospital Course:  5/16: Admitted to ICU for angioedema, S/p Benadryl, Pepcid, Decadron, and IM epinephrine in ED. Will continue scheduled pepcid, benadryl, and solumedrol. Nasopharyngoscopy performed by ENT: No base of tongue swelling. No supraglottic swelling.   5/17: Stable no acute events, patient reports improvement of swelling   5/18: No acute events, remains hemodynamically stable, maintaining secretions, Cleared by ENT    Consults:     IP CONSULT TO CRITICAL CARE  IP CONSULT TO OTOLARYNGOLOGY  IP CONSULT TO DIETITIAN     Procedures:  Nasopharyngoscopy 5/16    Any Hospital Acquired Infections:     PATIENT'S DISCHARGE CONDITION:      GOOD - Stable VS that are within normal limits, conscious patient, patient comfortable, expected excellent outcome.    PATIENT/FAMILY INSTRUCTIONS:     Current Discharge Medication List        START taking these medications    Details   amoxicillin-clavulanate (AUGMENTIN) 875-125 MG per tablet Take 1 tablet by mouth 2 times daily for 5 days  Qty: 10 tablet, Refills: 0      predniSONE (DELTASONE) 50 MG tablet Take 1 tablet by mouth daily for 3 days  Qty: 3 tablet,  Refills: 0           CONTINUE these medications which have NOT CHANGED    Details   Cholecalciferol (VITAMIN D3) 50 MCG (2000 UT) TABS Take 1 tablet by mouth daily      senna (SENOKOT) 8.8 MG/5ML SYRP syrup Take 5 mLs by mouth 2 times daily  Qty: 300 mL, Refills: 0             Activity: activity as tolerated    Diet: Feeds per PEG (baseline)    Disposition: home    Follow-up:  in 2 days with Kay Warner APRN - CNP,  in 2 days with Dr. Negro ENT    Electronically signed by Nico Johnson MD on 5/18/2024 at 9:46 AM     Time Spent on discharge is more than 20 minutes in the examination, evaluation, counseling and review of medications and discharge plan.      Nico Johnson MD  Critical Care Resident Physician/Emergency Medicine Resident Physician PGY 1   [place], [place]  5/18/2024 9:46 AM    Please note that this chart was generated using voice recognition Dragon dictation software.  Although every effort was made to ensure the accuracy of this automated transcription, some errors in transcription may have occurred.

## 2024-05-18 NOTE — PROGRESS NOTES
Continuous Infusions:   sodium chloride         PRN Meds:   sodium chloride flush, 5-40 mL, PRN  sodium chloride, , PRN  potassium chloride, 20 mEq, PRN   Or  potassium chloride, 10 mEq, PRN  magnesium sulfate, 2,000 mg, PRN  ondansetron, 4 mg, Q8H PRN   Or  ondansetron, 4 mg, Q6H PRN  polyethylene glycol, 17 g, Daily PRN  acetaminophen, 650 mg, Q6H PRN   Or  acetaminophen, 650 mg, Q6H PRN        SUPPORT DEVICES: [] Ventilator [] BIPAP  [] Nasal Cannula [x] Room Air    VENT SETTINGS (Comprehensive) (if applicable):       Additional Respiratory Assessments  Pulse: 67  Respirations: 22  SpO2: 96 %  No results found for: \"IFIO2\", \"MODE\", \"SETTIDVOL\", \"SETPEEP\"    ABGs:     No results found for: \"PH\", \"PCO2\", \"PO2\", \"HCO3\", \"O2SAT\"    DATA:  Complete Blood Count:   Recent Labs     05/16/24 1847 05/17/24  0448   WBC 19.8* 19.1*   RBC 4.80 4.35   HGB 15.7 14.4   HCT 46.5 41.1   MCV 96.9 94.5   MCH 32.7 33.1   MCHC 33.8 35.0*   RDW 11.9 11.8    281   MPV 10.9 10.0          Last 3 Blood Glucose:   Recent Labs     05/16/24 1847 05/17/24  0448   GLUCOSE 163* 148*          PT/INR:  No results found for: \"PROTIME\", \"INR\"  PTT:  No results found for: \"APTT\"    Basic Metabolic Profile:   Recent Labs     05/16/24 1847 05/17/24  0448    138   K 3.9 4.3    105   CO2 24 23   BUN 16 19   CREATININE 0.8 0.7   GLUCOSE 163* 148*         Liver Function:  No results for input(s): \"PROT\", \"LABALBU\", \"ALT\", \"AST\", \"GGT\", \"ALKPHOS\", \"BILITOT\" in the last 72 hours.    Magnesium: No results found for: \"MG\"  Phosphorus: No results found for: \"PHOS\"  Ionized Calcium: No results found for: \"CAION\"     Urinalysis:   Lab Results   Component Value Date/Time    NITRU NEGATIVE 09/30/2020 03:02 PM    COLORU DARK YELLOW 09/30/2020 03:02 PM    PHUR 8.0 09/30/2020 03:02 PM    WBCUA 0 TO 2 09/30/2020 03:02 PM    RBCUA None 09/30/2020 03:02 PM    MUCUS NOT REPORTED 09/30/2020 03:02 PM    TRICHOMONAS NOT REPORTED 09/30/2020 03:02  SOFT TISSUE NECK W CONTRAST   Final Result   No acute abnormality of the soft tissue structures of the neck identified.      Post treatment changes as detailed above.             ASSESSMENT:     Patient Active Problem List    Diagnosis Date Noted    Angioedema of tongue 05/16/2024    Tongue swelling 05/16/2024    Tubular adenoma 01/09/2019    Back pain 04/24/2013    Abnormal laboratory test result 12/11/2012    Low HDL (under 40) 12/11/2012    Neck pain, chronic 03/15/2012    History of malignant neoplasm 11/20/2011    Alcohol dependence (HCC) 09/02/2011    Disturbance of consciousness 09/02/2011    Lipoma of skin and subcutaneous tissue (excluding face) 09/02/2011    Malignant neoplastic disease (HCC) 09/02/2011    Mechanical complication of internal orthopedic device, implant or graft (HCC) 09/02/2011    Open wound of knee, leg, and ankle 09/02/2011    Pain in limb 09/02/2011    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 ENT consulted. Anesthesia made aware     PLAN/MEDICAL DECISION MAKING:  Neurologic:   Neuro intact  Neuro checks per protocol  no sedation  Cardiovascular:  Hemodynamically stable  MAP goal 65    Pulmonary:  Angioedema with isolated tongue swelling.  Muffled voice with minor protrusion of tongue.  History of oral cancer s/p mandibulectomy and reconstruction with adjuvant treatment, 2008.  Abnormal anatomy with micrognathia  High risk patient.  Consulting ENT STAT, discussed case with anesthesia who are aware.  ENT performed bedside laryngoscopy, majority of tongue swelling is anterior. Minimal base of tongue swelling   CT soft tissue neck in the ED showed dystrophic appearing calcification within the tongue, nasopharynx and oropharynx within normal

## 2024-05-18 NOTE — PROGRESS NOTES
ENT/OTOLARYNGOLOGY SUBSEQUENT CARE PROGRESS NOTE     REASON FOR CARE: f/u care     HISTORY OF PRESENT ILLNESS:   Jaya Staley is a 53 y.o. who is being seen for follow-up of his tongue swelling.  No issues since yesterday.    Feeling that his tongue is back to normal size.  No issues with breathing or any stridor, wheezing, increased WOB      Pertinent Examination:   GENERAL: well developed and well nourished and in no acute distress  HEAD: normocephalic and atraumatic  EYES: no eyelid swelling, no conjunctival injection or exudate, pupils equal round and reactive to light  EXTERNAL EARS: normal  EAR EXAM: deferred  NOSE: nares patent, normal mucosa  MOUTH/THROAT: tongue less bulky this am and flatter.  Still relatively immobile 2/2 prior XRT.     NECK: non-tender, woody induration and limited range of motion present, no mass, no focal lymphadenopathy  RESPIRATORY: Normal expansion.  Clear to auscultation.  No rales, rhonchi, or wheezing.  NEUROLOGICAL:  cranial nerves II-XII are grossly intact     RELEVANT LABS/STUDIES:   Additional data reviewed:    None    Procedures:    None    Surgical risk factors:  None     IMPRESSION AND RECOMMENDATIONS:   Jaya Stalye is a 53 y.o. male with tongue swelling, improving over the past 2 days. Patient reports tongue is back to normal.       Plan:  No current ENT intervention.     Can transfer out of ICU and possible d/c home from ENT perspective.  Will let medical team make these decisions.     ENT signing off.   Call with questions.     --------------------------------------------------  APOLINAR GLEZ MD  Pediatric Otolaryngology-Head and Neck Surgery  [facility] Otol[facility]    Office  ph# [phone]    Also available in [employer]

## 2024-05-18 NOTE — PLAN OF CARE
Problem: Discharge Planning  Goal: Discharge to home or other facility with appropriate resources  Outcome: Adequate for Discharge     Problem: Skin/Tissue Integrity - Adult  Goal: Skin integrity remains intact  Outcome: Adequate for Discharge     Problem: Musculoskeletal - Adult  Goal: Return mobility to safest level of function  Outcome: Adequate for Discharge  Flowsheets (Taken 5/18/2024 0854)  Return Mobility to Safest Level of Function: Assess patient stability and activity tolerance for standing, transferring and ambulating with or without assistive devices     Problem: Gastrointestinal - Adult  Goal: Maintains or returns to baseline bowel function  Outcome: Adequate for Discharge  Flowsheets (Taken 5/18/2024 0854)  Maintains or returns to baseline bowel function: Assess bowel function  Goal: Maintains adequate nutritional intake  Outcome: Adequate for Discharge     Problem: Pain  Goal: Verbalizes/displays adequate comfort level or baseline comfort level  Outcome: Adequate for Discharge     Problem: Nutrition Deficit:  Goal: Optimize nutritional status  Outcome: Adequate for Discharge

## 2024-05-18 NOTE — DISCHARGE INSTR - COC
Location / description:  On antibiotic-clindamycin (CLEOCIN) 300 MG capsule for dental infection. Started on the 4/27.  Pain is worse, rates @ 10/10. Pain also in the right ear. Denies any facial swelling. HA and blurred vision. Instructed to go to C or ED for eval. verbalized understanding.   Pain Scale (1 - 10), 10 highest: 10/10   Symptom specifc medications: clindamycin (CLEOCIN) 300 MG capsule  LMP : Patient's last menstrual period was 01/07/2012 (lmp unknown).    Reason for Disposition  • [1] SEVERE pain (e.g., excruciating, unable to do any normal activities) AND [2] not improved 2 hours after pain medicine    Protocols used: TOOTHACHE-A-AH       Continuity of Care Form    Patient Name: Jaya Staley   :  1970  MRN:  [id number]    Admit date:  2024  Discharge date:  ***    Code Status Order: Full Code   Advance Directives:     Admitting Physician:  Joey Velásquez MD  PCP: Kay Warner, APRN - CNP    Discharging Nurse: ***  Discharging Hospital Unit/Room#: 3008/3008-01  Discharging Unit Phone Number: ***    Emergency Contact:   Extended Emergency Contact Information  Primary Emergency Contact: Jaya Staley Sr  Address: NOT AVAILABLE           [address]  Home Phone: [phone]  Work Phone: [phone]  Mobile Phone: [phone]  Relation: Parent    Past Surgical History:  Past Surgical History:   Procedure Laterality Date    COLONOSCOPY  2018    tubular adenoma    COLONOSCOPY N/A 2023    COLONOSCOPY DIAGNOSTIC performed by Noel Dickey MD at [facility] ENDO    COLONOSCOPY N/A 2023    COLONOSCOPY DIAGNOSTIC performed by Noel Dickey MD at [facility] ENDO    INGUINAL HERNIA REPAIR Left     as 10 year old    MANDIBLE RECONSTRUCTION         Immunization History:   Immunization History   Administered Date(s) Administered    COVID-19, PFIZER PURPLE top, DILUTE for use, (age 12 y+), 30mcg/0.3mL 2021, 08/10/2021    Influenza 2012       Active Problems:  Patient Active Problem List   Diagnosis Code    Neck pain, chronic M54.2, G89.29    Abnormal laboratory test result R89.9    Low HDL (under 40) E78.6    Back pain M54.9    Alcohol dependence (HCC) F10.20    Disturbance of consciousness R40.4    History of malignant neoplasm Z85.9    Lipoma of skin and subcutaneous tissue (excluding face) D17.30    Malignant neoplastic disease (HCC) C80.1    Mechanical complication of internal orthopedic device, implant or graft (HCC) T84.498A    Open wound of knee, leg, and ankle S81.009A, S81.809A, S91.009A    Pain in limb M79.609    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     Tubular adenoma D36.9    Angioedema of tongue T78.3XXA    Tongue swelling R22.0       Isolation/Infection:   Isolation            No Isolation          Patient Infection Status       Infection Onset Added Last Indicated Last Indicated By Review Planned Expiration Resolved Resolved By    None active    Resolved    COVID-19 20 Covid-19 Ambulatory   20 Infection                        Nurse Assessment:  Last Vital Signs: /66   Pulse 98   Temp 98.1 °F (36.7 °C) (Oral)   Resp 22   Ht 1.702 m (5' 7.01\")   Wt 77.2 kg (170 lb 4.8 oz)   SpO2 96%   BMI 26.67 kg/m²     Last documented pain score (0-10 scale): Pain Level: 0  Last Weight:   Wt Readings from Last 1 Encounters:   24 77.2 kg (170 lb 4.8 oz)     Mental Status:  {IP PT MENTAL STATUS:}    IV Access:  { NIKI IV ACCESS:767031773}    Nursing Mobility/ADLs:  Walking   {CHP DME ADLs:832487884}  Transfer  {CHP DME ADLs:939230204}  Bathing  {CHP DME ADLs:800002151}  Dressing  {CHP DME ADLs:858481502}  Toileting  {CHP DME ADLs:407025348}  Feeding  {CHP DME ADLs:304448609}  Med Admin  {CHP DME ADLs:375452734}  Med Delivery   { NIKI MED Delivery:357017957}    Wound Care Documentation and Therapy:        Elimination:  Continence:   Bowel: {YES / NO:}  Bladder: {YES / NO:}  Urinary Catheter: {Urinary Catheter:501444491}   Colostomy/Ileostomy/Ileal Conduit: {YES / NO:}       Date of Last BM: ***    Intake/Output Summary (Last 24 hours) at 2024 1128  Last data filed at 2024 1115  Gross per 24 hour   Intake 2940 ml   Output 690 ml   Net 2250 ml     I/O last 3 completed shifts:  In: 2990 [I.V.:790; NG/GT:2200]  Out: 690 [Urine:690]    Safety Concerns:     { NIKI Safety Concerns:274106966}    Impairments/Disabilities:      {MH NIKI Impairments/Disabilities:031824122}    Nutrition Therapy:  Current Nutrition Therapy:   {[facility] Diet List:175599743}    Routes of Feeding: {P [facility] Other

## 2024-05-19 LAB
MICROORGANISM SPEC CULT: NORMAL
MICROORGANISM SPEC CULT: NORMAL
SERVICE CMNT-IMP: NORMAL
SERVICE CMNT-IMP: NORMAL
SPECIMEN DESCRIPTION: NORMAL
SPECIMEN DESCRIPTION: NORMAL

## 2024-06-15 ENCOUNTER — APPOINTMENT (OUTPATIENT)
Dept: GENERAL RADIOLOGY | Age: 54
DRG: 811 | End: 2024-06-15
Payer: COMMERCIAL

## 2024-06-15 ENCOUNTER — HOSPITAL ENCOUNTER (INPATIENT)
Age: 54
LOS: 1 days | Discharge: HOME | DRG: 811 | End: 2024-06-16
Attending: EMERGENCY MEDICINE | Admitting: INTERNAL MEDICINE
Payer: COMMERCIAL

## 2024-06-15 DIAGNOSIS — T78.3XXA ANGIOEDEMA, INITIAL ENCOUNTER: Primary | ICD-10-CM

## 2024-06-15 LAB
ALBUMIN SERPL-MCNC: 4.3 G/DL (ref 3.5–5.2)
ALBUMIN/GLOB SERPL: 1 {RATIO} (ref 1–2.5)
ALP SERPL-CCNC: 74 U/L (ref 40–129)
ALT SERPL-CCNC: 24 U/L (ref 10–50)
ANION GAP SERPL CALCULATED.3IONS-SCNC: 12 MMOL/L (ref 9–16)
AST SERPL-CCNC: 38 U/L (ref 10–50)
BASOPHILS # BLD: <0.03 K/UL (ref 0–0.2)
BASOPHILS NFR BLD: 0 % (ref 0–2)
BILIRUB SERPL-MCNC: 0.6 MG/DL (ref 0–1.2)
BODY TEMPERATURE: 37
BUN SERPL-MCNC: 14 MG/DL (ref 6–20)
CALCIUM SERPL-MCNC: 9.2 MG/DL (ref 8.6–10.4)
CHLORIDE SERPL-SCNC: 104 MMOL/L (ref 98–107)
CO2 SERPL-SCNC: 25 MMOL/L (ref 20–31)
COHGB MFR BLD: 3.8 % (ref 0–5)
CREAT SERPL-MCNC: 0.6 MG/DL (ref 0.7–1.2)
EOSINOPHIL # BLD: <0.03 K/UL (ref 0–0.44)
EOSINOPHILS RELATIVE PERCENT: 0 % (ref 1–4)
ERYTHROCYTE [DISTWIDTH] IN BLOOD BY AUTOMATED COUNT: 12.1 % (ref 11.8–14.4)
FIO2 ON VENT: ABNORMAL %
GFR, ESTIMATED: >90 ML/MIN/1.73M2
GLUCOSE SERPL-MCNC: 78 MG/DL (ref 74–99)
HCO3 VENOUS: 25.8 MMOL/L (ref 24–30)
HCT VFR BLD AUTO: 43 % (ref 40.7–50.3)
HGB BLD-MCNC: 14.2 G/DL (ref 13–17)
IMM GRANULOCYTES # BLD AUTO: 0.06 K/UL (ref 0–0.3)
IMM GRANULOCYTES NFR BLD: 1 %
LYMPHOCYTES NFR BLD: 1.06 K/UL (ref 1.1–3.7)
LYMPHOCYTES RELATIVE PERCENT: 9 % (ref 24–43)
MCH RBC QN AUTO: 32.3 PG (ref 25.2–33.5)
MCHC RBC AUTO-ENTMCNC: 33 G/DL (ref 28.4–34.8)
MCV RBC AUTO: 97.9 FL (ref 82.6–102.9)
MONOCYTES NFR BLD: 0.91 K/UL (ref 0.1–1.2)
MONOCYTES NFR BLD: 8 % (ref 3–12)
NEUTROPHILS NFR BLD: 82 % (ref 36–65)
NEUTS SEG NFR BLD: 9.72 K/UL (ref 1.5–8.1)
NRBC BLD-RTO: 0 PER 100 WBC
O2 SAT, VEN: 99.3 % (ref 60–85)
PCO2 VENOUS: 40.1 MM HG (ref 39–55)
PH VENOUS: 7.42 (ref 7.32–7.42)
PLATELET # BLD AUTO: 272 K/UL (ref 138–453)
PMV BLD AUTO: 11 FL (ref 8.1–13.5)
PO2 VENOUS: 158 MM HG (ref 30–50)
POSITIVE BASE EXCESS, VEN: 1.9 MMOL/L (ref 0–2)
POTASSIUM SERPL-SCNC: 3.9 MMOL/L (ref 3.7–5.3)
PROT SERPL-MCNC: 7.3 G/DL (ref 6.6–8.7)
RBC # BLD AUTO: 4.39 M/UL (ref 4.21–5.77)
SODIUM SERPL-SCNC: 141 MMOL/L (ref 136–145)
WBC OTHER # BLD: 11.8 K/UL (ref 3.5–11.3)

## 2024-06-15 PROCEDURE — 80053 COMPREHEN METABOLIC PANEL: CPT

## 2024-06-15 PROCEDURE — 85025 COMPLETE CBC W/AUTO DIFF WBC: CPT

## 2024-06-15 PROCEDURE — 96374 THER/PROPH/DIAG INJ IV PUSH: CPT

## 2024-06-15 PROCEDURE — 2500000003 HC RX 250 WO HCPCS: Performed by: STUDENT IN AN ORGANIZED HEALTH CARE EDUCATION/TRAINING PROGRAM

## 2024-06-15 PROCEDURE — 99285 EMERGENCY DEPT VISIT HI MDM: CPT

## 2024-06-15 PROCEDURE — G0378 HOSPITAL OBSERVATION PER HR: HCPCS

## 2024-06-15 PROCEDURE — 36415 COLL VENOUS BLD VENIPUNCTURE: CPT

## 2024-06-15 PROCEDURE — 6360000002 HC RX W HCPCS: Performed by: STUDENT IN AN ORGANIZED HEALTH CARE EDUCATION/TRAINING PROGRAM

## 2024-06-15 PROCEDURE — 2000000000 HC ICU R&B

## 2024-06-15 PROCEDURE — 96375 TX/PRO/DX INJ NEW DRUG ADDON: CPT

## 2024-06-15 PROCEDURE — 71045 X-RAY EXAM CHEST 1 VIEW: CPT

## 2024-06-15 PROCEDURE — 82805 BLOOD GASES W/O2 SATURATION: CPT

## 2024-06-15 PROCEDURE — 70360 X-RAY EXAM OF NECK: CPT

## 2024-06-15 PROCEDURE — 96372 THER/PROPH/DIAG INJ SC/IM: CPT

## 2024-06-15 PROCEDURE — 2580000003 HC RX 258: Performed by: STUDENT IN AN ORGANIZED HEALTH CARE EDUCATION/TRAINING PROGRAM

## 2024-06-15 RX ORDER — IBUPROFEN 400 MG/1
600 TABLET ORAL ONCE
Status: DISCONTINUED | OUTPATIENT
Start: 2024-06-15 | End: 2024-06-15

## 2024-06-15 RX ORDER — KETOROLAC TROMETHAMINE 15 MG/ML
15 INJECTION, SOLUTION INTRAMUSCULAR; INTRAVENOUS ONCE
Status: COMPLETED | OUTPATIENT
Start: 2024-06-15 | End: 2024-06-15

## 2024-06-15 RX ORDER — EPINEPHRINE 1 MG/ML
0.3 INJECTION, SOLUTION INTRAMUSCULAR; SUBCUTANEOUS ONCE
Status: COMPLETED | OUTPATIENT
Start: 2024-06-15 | End: 2024-06-15

## 2024-06-15 RX ORDER — DIPHENHYDRAMINE HYDROCHLORIDE 50 MG/ML
25 INJECTION INTRAMUSCULAR; INTRAVENOUS ONCE
Status: COMPLETED | OUTPATIENT
Start: 2024-06-15 | End: 2024-06-15

## 2024-06-15 RX ADMIN — FAMOTIDINE 20 MG: 10 INJECTION, SOLUTION INTRAVENOUS at 18:34

## 2024-06-15 RX ADMIN — EPINEPHRINE 0.3 MG: 1 INJECTION INTRAMUSCULAR; INTRAVENOUS; SUBCUTANEOUS at 18:33

## 2024-06-15 RX ADMIN — DIPHENHYDRAMINE HYDROCHLORIDE 25 MG: 50 INJECTION INTRAMUSCULAR; INTRAVENOUS at 18:34

## 2024-06-15 RX ADMIN — KETOROLAC TROMETHAMINE 15 MG: 15 INJECTION, SOLUTION INTRAMUSCULAR; INTRAVENOUS at 21:59

## 2024-06-15 RX ADMIN — WATER 125 MG: 1 INJECTION INTRAMUSCULAR; INTRAVENOUS; SUBCUTANEOUS at 18:34

## 2024-06-15 NOTE — ED TRIAGE NOTES
Pt presents to ED room 18 ambulatory from triage c/o oral swelling. Pt reports that he has NKDA and has this issue occasionally. Pt reports that he is G-tube dependent. Pt reports no rash and does not feel anything in his nose. Pt resting on stretcher, NAD noted, RR even and non labored. Pt does have gargled speech from oral swelling.

## 2024-06-16 VITALS
BODY MASS INDEX: 25.14 KG/M2 | HEART RATE: 76 BPM | RESPIRATION RATE: 13 BRPM | WEIGHT: 169.75 LBS | HEIGHT: 69 IN | OXYGEN SATURATION: 99 % | DIASTOLIC BLOOD PRESSURE: 94 MMHG | TEMPERATURE: 98.5 F | SYSTOLIC BLOOD PRESSURE: 123 MMHG

## 2024-06-16 LAB
ALBUMIN SERPL-MCNC: 4.3 G/DL (ref 3.5–5.2)
ALBUMIN/GLOB SERPL: 2 {RATIO} (ref 1–2.5)
ALP SERPL-CCNC: 74 U/L (ref 40–129)
ALT SERPL-CCNC: 17 U/L (ref 10–50)
ANION GAP SERPL CALCULATED.3IONS-SCNC: 11 MMOL/L (ref 9–16)
AST SERPL-CCNC: 35 U/L (ref 10–50)
BASOPHILS # BLD: 0 K/UL (ref 0–0.2)
BASOPHILS NFR BLD: 0 % (ref 0–2)
BILIRUB DIRECT SERPL-MCNC: <0.2 MG/DL (ref 0–0.3)
BILIRUB INDIRECT SERPL-MCNC: NORMAL MG/DL (ref 0–1)
BILIRUB SERPL-MCNC: 0.6 MG/DL (ref 0–1.2)
BUN SERPL-MCNC: 18 MG/DL (ref 6–20)
CALCIUM SERPL-MCNC: 9.3 MG/DL (ref 8.6–10.4)
CHLORIDE SERPL-SCNC: 105 MMOL/L (ref 98–107)
CO2 SERPL-SCNC: 22 MMOL/L (ref 20–31)
CREAT SERPL-MCNC: 0.7 MG/DL (ref 0.7–1.2)
EOSINOPHIL # BLD: 0 K/UL (ref 0–0.4)
EOSINOPHILS RELATIVE PERCENT: 0 % (ref 1–4)
ERYTHROCYTE [DISTWIDTH] IN BLOOD BY AUTOMATED COUNT: 11.9 % (ref 11.8–14.4)
GFR, ESTIMATED: >90 ML/MIN/1.73M2
GLOBULIN SER CALC-MCNC: 2.8 G/DL
GLUCOSE SERPL-MCNC: 141 MG/DL (ref 74–99)
HCT VFR BLD AUTO: 41.2 % (ref 40.7–50.3)
HGB BLD-MCNC: 13.6 G/DL (ref 13–17)
IMM GRANULOCYTES # BLD AUTO: 0 K/UL (ref 0–0.3)
IMM GRANULOCYTES NFR BLD: 0 %
LACTIC ACID, WHOLE BLOOD: 1.1 MMOL/L (ref 0.7–2.1)
LACTIC ACID, WHOLE BLOOD: 1.6 MMOL/L (ref 0.7–2.1)
LYMPHOCYTES NFR BLD: 0.44 K/UL (ref 1–4.8)
LYMPHOCYTES RELATIVE PERCENT: 4 % (ref 24–44)
MCH RBC QN AUTO: 32.4 PG (ref 25.2–33.5)
MCHC RBC AUTO-ENTMCNC: 33 G/DL (ref 28.4–34.8)
MCV RBC AUTO: 98.1 FL (ref 82.6–102.9)
MONOCYTES NFR BLD: 0.11 K/UL (ref 0.1–0.8)
MONOCYTES NFR BLD: 1 % (ref 1–7)
MORPHOLOGY: NORMAL
MRSA, DNA, NASAL: NEGATIVE
NEUTROPHILS NFR BLD: 95 % (ref 36–66)
NEUTS SEG NFR BLD: 10.35 K/UL (ref 1.8–7.7)
NRBC BLD-RTO: 0 PER 100 WBC
PLATELET # BLD AUTO: 264 K/UL (ref 138–453)
PMV BLD AUTO: 10 FL (ref 8.1–13.5)
POTASSIUM SERPL-SCNC: 4.6 MMOL/L (ref 3.7–5.3)
PROT SERPL-MCNC: 7.1 G/DL (ref 6.6–8.7)
RBC # BLD AUTO: 4.2 M/UL (ref 4.21–5.77)
SODIUM SERPL-SCNC: 138 MMOL/L (ref 136–145)
SPECIMEN DESCRIPTION: NORMAL
WBC OTHER # BLD: 10.9 K/UL (ref 3.5–11.3)

## 2024-06-16 PROCEDURE — 96372 THER/PROPH/DIAG INJ SC/IM: CPT

## 2024-06-16 PROCEDURE — 96376 TX/PRO/DX INJ SAME DRUG ADON: CPT

## 2024-06-16 PROCEDURE — 87641 MR-STAPH DNA AMP PROBE: CPT

## 2024-06-16 PROCEDURE — G0378 HOSPITAL OBSERVATION PER HR: HCPCS

## 2024-06-16 PROCEDURE — 85025 COMPLETE CBC W/AUTO DIFF WBC: CPT

## 2024-06-16 PROCEDURE — 80048 BASIC METABOLIC PNL TOTAL CA: CPT

## 2024-06-16 PROCEDURE — 83605 ASSAY OF LACTIC ACID: CPT

## 2024-06-16 PROCEDURE — 99223 1ST HOSP IP/OBS HIGH 75: CPT | Performed by: INTERNAL MEDICINE

## 2024-06-16 PROCEDURE — 96375 TX/PRO/DX INJ NEW DRUG ADDON: CPT

## 2024-06-16 PROCEDURE — 99254 IP/OBS CNSLTJ NEW/EST MOD 60: CPT | Performed by: STUDENT IN AN ORGANIZED HEALTH CARE EDUCATION/TRAINING PROGRAM

## 2024-06-16 PROCEDURE — 87040 BLOOD CULTURE FOR BACTERIA: CPT

## 2024-06-16 PROCEDURE — 80076 HEPATIC FUNCTION PANEL: CPT

## 2024-06-16 PROCEDURE — C9113 INJ PANTOPRAZOLE SODIUM, VIA: HCPCS

## 2024-06-16 PROCEDURE — 36415 COLL VENOUS BLD VENIPUNCTURE: CPT

## 2024-06-16 PROCEDURE — 6360000002 HC RX W HCPCS

## 2024-06-16 PROCEDURE — 2580000003 HC RX 258

## 2024-06-16 RX ORDER — ACETAMINOPHEN 325 MG/1
650 TABLET ORAL EVERY 6 HOURS PRN
Status: DISCONTINUED | OUTPATIENT
Start: 2024-06-16 | End: 2024-06-16 | Stop reason: HOSPADM

## 2024-06-16 RX ORDER — SODIUM CHLORIDE 9 MG/ML
INJECTION, SOLUTION INTRAVENOUS PRN
Status: DISCONTINUED | OUTPATIENT
Start: 2024-06-16 | End: 2024-06-16 | Stop reason: HOSPADM

## 2024-06-16 RX ORDER — DIPHENHYDRAMINE HYDROCHLORIDE 50 MG/ML
25 INJECTION INTRAMUSCULAR; INTRAVENOUS EVERY 6 HOURS PRN
Status: DISCONTINUED | OUTPATIENT
Start: 2024-06-16 | End: 2024-06-16 | Stop reason: HOSPADM

## 2024-06-16 RX ORDER — SODIUM CHLORIDE 0.9 % (FLUSH) 0.9 %
5-40 SYRINGE (ML) INJECTION EVERY 12 HOURS SCHEDULED
Status: DISCONTINUED | OUTPATIENT
Start: 2024-06-16 | End: 2024-06-16 | Stop reason: HOSPADM

## 2024-06-16 RX ORDER — POTASSIUM CHLORIDE 29.8 MG/ML
20 INJECTION INTRAVENOUS PRN
Status: DISCONTINUED | OUTPATIENT
Start: 2024-06-16 | End: 2024-06-16 | Stop reason: HOSPADM

## 2024-06-16 RX ORDER — ONDANSETRON 2 MG/ML
4 INJECTION INTRAMUSCULAR; INTRAVENOUS EVERY 6 HOURS PRN
Status: DISCONTINUED | OUTPATIENT
Start: 2024-06-16 | End: 2024-06-16 | Stop reason: HOSPADM

## 2024-06-16 RX ORDER — POLYETHYLENE GLYCOL 3350 17 G/17G
17 POWDER, FOR SOLUTION ORAL DAILY PRN
Status: DISCONTINUED | OUTPATIENT
Start: 2024-06-16 | End: 2024-06-16 | Stop reason: HOSPADM

## 2024-06-16 RX ORDER — ENOXAPARIN SODIUM 100 MG/ML
40 INJECTION SUBCUTANEOUS DAILY
Status: DISCONTINUED | OUTPATIENT
Start: 2024-06-16 | End: 2024-06-16 | Stop reason: HOSPADM

## 2024-06-16 RX ORDER — POTASSIUM CHLORIDE 7.45 MG/ML
10 INJECTION INTRAVENOUS PRN
Status: DISCONTINUED | OUTPATIENT
Start: 2024-06-16 | End: 2024-06-16 | Stop reason: HOSPADM

## 2024-06-16 RX ORDER — ONDANSETRON 4 MG/1
4 TABLET, ORALLY DISINTEGRATING ORAL EVERY 8 HOURS PRN
Status: DISCONTINUED | OUTPATIENT
Start: 2024-06-16 | End: 2024-06-16 | Stop reason: HOSPADM

## 2024-06-16 RX ORDER — ACETAMINOPHEN 650 MG/1
650 SUPPOSITORY RECTAL EVERY 6 HOURS PRN
Status: DISCONTINUED | OUTPATIENT
Start: 2024-06-16 | End: 2024-06-16 | Stop reason: HOSPADM

## 2024-06-16 RX ORDER — MAGNESIUM SULFATE IN WATER 40 MG/ML
2000 INJECTION, SOLUTION INTRAVENOUS PRN
Status: DISCONTINUED | OUTPATIENT
Start: 2024-06-16 | End: 2024-06-16 | Stop reason: HOSPADM

## 2024-06-16 RX ORDER — SODIUM CHLORIDE 0.9 % (FLUSH) 0.9 %
5-40 SYRINGE (ML) INJECTION PRN
Status: DISCONTINUED | OUTPATIENT
Start: 2024-06-16 | End: 2024-06-16 | Stop reason: HOSPADM

## 2024-06-16 RX ADMIN — SODIUM CHLORIDE, PRESERVATIVE FREE 10 ML: 5 INJECTION INTRAVENOUS at 08:38

## 2024-06-16 RX ADMIN — ENOXAPARIN SODIUM 40 MG: 100 INJECTION SUBCUTANEOUS at 08:36

## 2024-06-16 RX ADMIN — METHYLPREDNISOLONE SODIUM SUCCINATE 40 MG: 40 INJECTION INTRAMUSCULAR; INTRAVENOUS at 08:37

## 2024-06-16 RX ADMIN — SODIUM CHLORIDE 40 MG: 9 INJECTION INTRAMUSCULAR; INTRAVENOUS; SUBCUTANEOUS at 08:37

## 2024-06-16 ASSESSMENT — PAIN SCALES - GENERAL: PAINLEVEL_OUTOF10: 0

## 2024-06-16 NOTE — CONSULTS
CONSULTING SERVICE: Otolaryngology-Head and Neck Surgery    Informant:   The history was obtained from chart review, the patient, and primary team.    Chief Complaint:   His chief complaint is tongue swelling    History of Present Illness:   Jaya Staley is a 53 y.o. male with a history of oral cancer who underwent segmental mandibulectomy, neck dissection, floor of mouth resection, and left fibular free flap reconstruction prior to the adjuvant treatment in 2008.  In February 2024, patient underwent direct laryngoscopy with removal of mandibular hardware by ENT at [facility].  Yesterday he went to the emergency department for a second episode of tongue swelling.  Patient was last seen for similar presentation on 5/16.  He denies any new medications or tube feeds.  He does not take anything by mouth.  All nutrition is through his PEG tube.  He denies any antihypertensive medication such as lisinopril or any other ACE inhibitor's.  He denies being stung by bee.  No known allergies.  He denies any chest pain or shortness of breath.  No nausea or vomiting.     This morning on evaluation he is awake and alert.  Eats that swelling is back to his baseline and is without difficulties breathing. He  is managing secretions without any difficulty.  Denies any respiratory distress at this time.  He is on room air. Denies any WOB, stridor, wheezing, difficulty catching his breath.    Patient sleeps lying flat on his right side, which per review appears to be the side of his anastomosis for foot free flap reconstruction.  Both occasions occurred upon waking up from sleep.    Other Pertinent ENT-specific HPI:  Has had segmental mandibulectomy, neck dissection, floor of mouth resection, and left fibular free flap reconstruction prior to the adjuvant treatment in 2008.  In February 2024, patient underwent direct laryngoscopy with removal of mandibular hardware by ENT at [facility].

## 2024-06-16 NOTE — H&P
Negative for color change and rash.   Neurological:  Negative for dizziness, weakness and headaches.   Psychiatric/Behavioral:  Negative for agitation and confusion. The patient is not nervous/anxious.        Physical Exam:    Vitals: BP (!) 103/59   Pulse 97   Temp 97.3 °F (36.3 °C) (Oral)   Resp 26   SpO2 97%     Body weight:   Wt Readings from Last 3 Encounters:   05/18/24 77.2 kg (170 lb 4.8 oz)   08/29/23 57.6 kg (127 lb)   08/28/23 57.6 kg (127 lb)       Body Mass Index : There is no height or weight on file to calculate BMI.          PHYSICAL EXAMINATION :  Constitutional:       Appearance: He is not toxic-appearing or diaphoretic.      Comments: Chronically ill-appearing male resting in no apparent distress with obvious baseline head and neck anatomy changes from surgery/radiation.  No tripoding   HENT:      Head: Normocephalic.      Right Ear: Ear canal and external ear normal.      Left Ear: Ear canal and external ear normal.      Nose: Nose normal. No congestion.      Mouth/Throat:      Mouth: Mucous membranes are moist.      Comments: Enlarged anterior aspect of tongue.  Base of tongue appears to be within normal limits.  Baseline anatomy changes from mandibular resection with neck dissection and radiation.  Drooling present.  Uvula is midline and not swollen.  Posterior oropharynx without erythema or swelling.  Eyes:      General:         Right eye: No discharge.         Left eye: No discharge.      Extraocular Movements: Extraocular movements intact.      Conjunctiva/sclera: Conjunctivae normal.      Pupils: Pupils are equal, round, and reactive to light.   Neck:      Comments: Anatomy changes secondary to previous head and neck surgery with radiation.  Cardiovascular:      Rate and Rhythm: Normal rate and regular rhythm.      Pulses: Normal pulses.      Heart sounds: Normal heart sounds.   Pulmonary:      Effort: Pulmonary effort is normal. No respiratory distress.      Breath sounds: Normal

## 2024-06-16 NOTE — DISCHARGE INSTRUCTIONS
You came to the hospital with tongue swelling. You were admitted to the ICU for close monitoring and seen by Ear nose throat doctor, who cleared you from the concern for angioedema. You are being discharged  Please continue taking all medications as prescribed   Please follow up with your PCP in 1 week  Please visit the ED if you feel your tongue is swelling up more, throat closing, difficulty in breathing, shortness of breath, lightheadedness, chest pain etc

## 2024-06-16 NOTE — ED NOTES
ED to inpatient nurses report    Chief Complaint   Patient presents with    Oral Swelling     Started this morning. Hx of cancer to jaw       Present to ED from home  LOC: alert and orientated to name, place, date  Vital signs   Vitals:    06/15/24 1845 06/15/24 1846 06/15/24 1847 06/15/24 1849   BP: 124/72      Pulse: (!) 102 99 100 98   Resp: 18 27 27 29   Temp:       TempSrc:       SpO2: 98% 98% 98% 97%      Oxygen Baseline RA    Current needs required none   LDAs:   Peripheral IV 06/15/24 Right Forearm (Active)   Site Assessment Clean, dry & intact 06/15/24 1824     Mobility: Independent  Pending ED orders: none  Present condition: stable  Code Status: [unfilled]   Consults:  []  Hospitalist  Completed  [] yes [] no  []  Medicine  Completed  [] yes [] No  []  Cardiology  Completed  [] yes [] No  []  GI   Completed  [] yes [] No  []  Neurology  Completed  [] yes [] No  []  Nephrology Completed  [] yes [] No  []  Vascular  Completed  [] yes [] No   []  Surgery  Completed  [] yes [] No   []  Urology  Completed  [] yes [] No   []  Plastics  Completed  [] yes [] No   []  ENT  Completed  [] yes [] No   [x]  Other critical care  Completed  [x] yes [] No  Pertinent event(s) see blank notes  Pertinent event(s) Pt presents to ED room 18 ambulatory from triage c/o oral swelling. Pt reports that he has NKDA and has this issue occasionally. Pt reports that he is G-tube dependent. Pt reports no rash and does not feel anything in his nose. Pt resting on stretcher, NAD noted, RR even and non labored. Pt does have gargled speech from oral swelling. Pt reports swelling started this morning with no worsening.   Electronically signed by Shania Starr RN on 6/15/2024 at 6:51 PM    
Report given to Car, 3 RN. All questions answered. LAVON Pacheco to transport patient.   
The following labs were labeled with appropriate pt sticker and tubed to lab:     [] Blue     [] Lavender   [] on ice  [] Green/yellow  [x] Green/black [x] on ice  [] Grey  [] on ice  [] Yellow  [] Red  [] Pink  [] Type/ Screen  [] ABG  [] VBG    [] COVID-19 swab    [] Rapid  [] PCR  [] Flu swab  [] Peds Viral Panel     [] Urine Sample  [] Fecal Sample  [] Pelvic Cultures  [] Blood Cultures  [] X 2  [] STREP Cultures  [] Wound Cultures    
The following labs were labeled with appropriate pt sticker and tubed to lab:     [x] Blue     [x] Lavender   [] on ice  [x] Green/yellow  [] Green/black [] on ice  [] Grey  [] on ice  [] Yellow  [] Red  [] Pink  [] Type/ Screen  [] ABG  [] VBG    [] COVID-19 swab    [] Rapid  [] PCR  [] Flu swab  [] Peds Viral Panel     [] Urine Sample  [] Fecal Sample  [] Pelvic Cultures  [] Blood Cultures  [] X 2  [] STREP Cultures  [] Wound Cultures    
injection 0.3 mg    famotidine (PEPCID) 20 mg in sodium chloride (PF) 0.9 % 10 mL injection    diphenhydrAMINE (BENADRYL) injection 25 mg    methylPREDNISolone sodium succ (SOLU-MEDROL) 125 mg in sterile water 2 mL injection       SURGICAL HISTORY       Past Surgical History:   Procedure Laterality Date    COLONOSCOPY  11/26/2018    tubular adenoma    COLONOSCOPY N/A 8/28/2023    COLONOSCOPY DIAGNOSTIC performed by Noel Dickey MD at [facility] ENDO    COLONOSCOPY N/A 8/29/2023    COLONOSCOPY DIAGNOSTIC performed by Noel Dickey MD at [facility]    INGUINAL HERNIA REPAIR Left     as 10 year old    MANDIBLE RECONSTRUCTION         PAST MEDICAL HISTORY       Past Medical History:   Diagnosis Date    Cancer (HCC) 2008    oral     Colon cancer screening     Colon polyps 11/26/2018    tubular adenoma    Constipation     Dysphagia     Uses feeding tube     Weight loss        Labs:  Labs Reviewed - No data to display    Electronically signed by Ronen Multani RN on 6/15/2024 at 7:06 PM

## 2024-06-16 NOTE — CARE COORDINATION
06/16/24 1349   Readmission Assessment   Number of Days since last admission? 8-30 days   Previous Disposition Home with Family   Who is being Interviewed Patient   What was the patient's/caregiver's perception as to why they think they needed to return back to the hospital? Did not realize care needs would be so extensive   Did you visit your Primary Care Physician after you left the hospital, before you returned this time? No   Why weren't you able to visit your PCP? Did not have an appointment   Did you see a specialist, such as Cardiac, Pulmonary, Orthopedic Physician, etc. after you left the hospital? No   Who advised the patient to return to the hospital? Self-referral  (Drove himself to the ER)   Does the patient report anything that got in the way of taking their medications? No   In our efforts to provide the best possible care to you and others like you, can you think of anything that we could have done to help you after you left the hospital the first time, so that you might not have needed to return so soon? Other (Comment)  (pt states he understood his dc instructions.)

## 2024-06-16 NOTE — PLAN OF CARE
Problem: Discharge Planning  Goal: Discharge to home or other facility with appropriate resources  Outcome: Progressing  Flowsheets  Taken 6/16/2024 1026 by Moon Emery RN  Discharge to home or other facility with appropriate resources:   Identify barriers to discharge with patient and caregiver   Identify discharge learning needs (meds, wound care, etc)   Refer to discharge planning if patient needs post-hospital services based on physician order or complex needs related to functional status, cognitive ability or social support system  Taken 6/16/2024 0101 by Teena Mitchell RN  Discharge to home or other facility with appropriate resources: Arrange for interpreters to assist at discharge as needed     Problem: Safety - Adult  Goal: Free from fall injury  Outcome: Progressing     Moon Emery RN

## 2024-06-16 NOTE — CARE COORDINATION
Case Management Assessment  Initial Evaluation    Date/Time of Evaluation: 6/16/2024 2:02 PM  Assessment Completed by: RAVEN ALONZO RN    If patient is discharged prior to next notation, then this note serves as note for discharge by case management.    Patient Name: Jaya Staley                   YOB: 1970  Diagnosis: Angioedema, initial encounter [T78.3XXA]  Angioedema of tongue [T78.3XXA]                   Date / Time: 6/15/2024  6:15 PM    Patient Admission Status: Inpatient   Readmission Risk (Low < 19, Mod (19-27), High > 27): Readmission Risk Score: 10.1    Current PCP: Kay Warner APRN - CNP  PCP verified by CM? (P) Yes (Trae LAUGHLIN CNP)    Chart Reviewed: Yes      History Provided by: (P) Patient  Patient Orientation: (P) Alert and Oriented    Patient Cognition: (P) Alert    Hospitalization in the last 30 days (Readmission):  Yes    If yes, Readmission Assessment in CM Navigator will be completed.    Advance Directives:      Code Status: Full Code   Patient's Primary Decision Maker is: Legal Next of Kin    Primary Decision Maker: Jaya Staley Sr - Srikanth - [phone]    Discharge Planning:    Patient lives with: (P) Alone Type of Home: (P) Apartment  Primary Care Giver: (P) Self  Patient Support Systems include: (P) Parent   Current Financial resources: (P) Medicaid ([employer])  Current community resources: (P) None  Current services prior to admission: (P) Other (Comment) (pt has PEG for TF, has his TF at home, does not need any more. PT takes care of ordering this for himself)            Current DME:              Type of Home Care services:  (P) None    ADLS  Prior functional level: (P) Independent in ADLs/IADLs  Current functional level: (P) Independent in ADLs/IADLs    PT AM-PAC:   /24  OT AM-PAC:   /24    Family can provide assistance at DC: (P) Yes  Would you like Case Management to discuss the discharge plan with any other family members/significant others,

## 2024-06-16 NOTE — PROGRESS NOTES
[facility]  [place]    Shift date: 6.15.2024  Shift day: Saturday   Shift # 2    Room # 18/18   Name: Jaya Staley                Protestant: Unknown  Place of Pentecostal: Unknown    Referral: Routine Visit    Admit Date & Time: 6/15/2024  6:15 PM    Assessment:  Jaya Staley is a 53 y.o. male in the hospital with the patient appears to be calm and coping at this time.  Patient appears to have no support bedside but states that family is aware he is staying.  Patient is complaining of head pain.  Nurse was made aware.      Intervention:  Writer introduced self and title as . Writer offered space for feelings, needs, thoughts, and concerns.  Provided a ministry of presence and extended hospitality.     Outcome:  Patient appears to remain calm and coping at this time.      Plan:  Chaplains will remain available to offer spiritual and emotional support as needed.      Electronically signed by Alberto Real,Clinical  on 6/15/2024 at 11:29 PM.  [facility]  [phone]       06/15/24 2130   Encounter Summary   Encounter Overview/Reason Initial Encounter   Service Provided For Patient   Referral/Consult From Rounding   Support System Family members   Last Encounter  06/15/24   Complexity of Encounter Low   Begin Time 2130   End Time  2145   Total Time Calculated 15 min   Assessment/Intervention/Outcome   Assessment Calm;Coping   Intervention Active listening;Discussed illness injury and it’s impact;Explored/Affirmed feelings, thoughts, concerns   Outcome Coping     Electronically signed by Alberto Real on 6/15/2024 at 11:29 PM    
(24hrs), Av , Min:101 , Max:134     Diastolic (24hrs), Av, Min:58, Max:83    Pulse Range: Pulse  Av.6  Min: 65  Max: 113      PULMONARY:  Respiration Range: Resp  Av.5  Min: 14  Max: 33  Current Pulse Ox: SpO2: 96 %  24HR Pulse Ox Range: SpO2  Av.5 %  Min: 93 %  Max: 100 %  Tongue swelling  No concern for angiodema  No stridor  Received benadryl, solumedrol in ED  Methylprednsione 40 X1   Discharge ok with ENT       GI/NUTRITION:  Diet NPO  - Dx:   - Bowel regimen:   - GI prophylaxis: Protonix     ID:  Tmax: Temp (24hrs), Av.9 °F (36.6 °C), Min:97.3 °F (36.3 °C), Max:98.5 °F (36.9 °C)    Temperature Range: Temp: 98.5 °F (36.9 °C) Temp  Av.9 °F (36.6 °C)  Min: 97.3 °F (36.3 °C)  Max: 98.5 °F (36.9 °C)  - WBC   Lab Results   Component Value Date    WBC 10.9 2024     - Dx:   - Antimicrobials:  not indicated     HEME:   Recent Labs     06/15/24  2141 06/16/24  0328   HGB 14.2 13.6    stable  - Platelets:    - Dx:     ENDOCRINE:  - Dx:   - Continue to monitor blood glucose, goal <180  - Most recent BGL is   Recent Labs     06/15/24  2141 06/16/24  0328   GLUCOSE 78 141*       OTHER:  - PT/OT/ST   - Code Status: Full Code    PROPHYLAXIS:  - Stress ulcer: PPI  - DVT: SCDs, Lovenox         FAMILY UPDATED:                [] No  [] Yes     HOME MEDICATIONS RECONCILED: [] No  [] Yes    CONSULTATION NEEDED:                 [] No  [] Yes       FAST FROILAN Heart MD  Department of Internal Medicine/ Critical care  [place], [place]  2024 9:24 AM        The critical care team assigned to the patient will be following up the patient in the intensive care unit. I have discussed the current plan with the critical care attending.The above mentioned assessment and plan will be reviewed again in detail by the critical care attending at bedside, and can be further changed or modified accordingly by the attending physician.

## 2024-06-16 NOTE — PLAN OF CARE
Problem: Discharge Planning  Goal: Discharge to home or other facility with appropriate resources  6/16/2024 1308 by Moon Emery RN  Outcome: Completed  6/16/2024 1026 by Moon Emery RN  Outcome: Progressing  Flowsheets  Taken 6/16/2024 1026 by Moon Emery RN  Discharge to home or other facility with appropriate resources:   Identify barriers to discharge with patient and caregiver   Identify discharge learning needs (meds, wound care, etc)   Refer to discharge planning if patient needs post-hospital services based on physician order or complex needs related to functional status, cognitive ability or social support system  Taken 6/16/2024 0101 by Teena Mitchell RN  Discharge to home or other facility with appropriate resources: Arrange for interpreters to assist at discharge as needed     Problem: Safety - Adult  Goal: Free from fall injury  6/16/2024 1308 by Moon Emery RN  Outcome: Completed  6/16/2024 1026 by Moon Emery, RN  Outcome: Progressing     Moon Emery RN

## 2024-06-19 ENCOUNTER — APPOINTMENT (OUTPATIENT)
Dept: ULTRASOUND IMAGING | Age: 54
End: 2024-06-19
Payer: COMMERCIAL

## 2024-06-19 ENCOUNTER — HOSPITAL ENCOUNTER (EMERGENCY)
Age: 54
Discharge: HOME | End: 2024-06-19
Attending: EMERGENCY MEDICINE
Payer: COMMERCIAL

## 2024-06-19 VITALS
RESPIRATION RATE: 18 BRPM | SYSTOLIC BLOOD PRESSURE: 122 MMHG | BODY MASS INDEX: 26.06 KG/M2 | TEMPERATURE: 96.3 F | DIASTOLIC BLOOD PRESSURE: 83 MMHG | HEART RATE: 71 BPM | WEIGHT: 175.93 LBS | HEIGHT: 69 IN | OXYGEN SATURATION: 99 %

## 2024-06-19 DIAGNOSIS — N45.1 EPIDIDYMITIS: Primary | ICD-10-CM

## 2024-06-19 PROCEDURE — 99284 EMERGENCY DEPT VISIT MOD MDM: CPT

## 2024-06-19 PROCEDURE — 96372 THER/PROPH/DIAG INJ SC/IM: CPT

## 2024-06-19 PROCEDURE — 6360000002 HC RX W HCPCS

## 2024-06-19 PROCEDURE — 76870 US EXAM SCROTUM: CPT

## 2024-06-19 RX ORDER — CEFTRIAXONE 500 MG/1
500 INJECTION, POWDER, FOR SOLUTION INTRAMUSCULAR; INTRAVENOUS ONCE
Status: COMPLETED | OUTPATIENT
Start: 2024-06-19 | End: 2024-06-19

## 2024-06-19 RX ORDER — DOXYCYCLINE HYCLATE 100 MG
100 TABLET ORAL ONCE
Status: DISCONTINUED | OUTPATIENT
Start: 2024-06-19 | End: 2024-06-19 | Stop reason: HOSPADM

## 2024-06-19 RX ORDER — LEVOFLOXACIN 500 MG/1
500 TABLET, FILM COATED ORAL DAILY
Qty: 10 TABLET | Refills: 0 | Status: SHIPPED | OUTPATIENT
Start: 2024-06-19 | End: 2024-06-29

## 2024-06-19 RX ORDER — DOXYCYCLINE HYCLATE 100 MG
100 TABLET ORAL 2 TIMES DAILY
Qty: 14 TABLET | Refills: 0 | Status: SHIPPED | OUTPATIENT
Start: 2024-06-19 | End: 2024-06-19

## 2024-06-19 RX ADMIN — CEFTRIAXONE SODIUM 500 MG: 500 INJECTION, POWDER, FOR SOLUTION INTRAMUSCULAR; INTRAVENOUS at 15:36

## 2024-06-19 ASSESSMENT — PAIN - FUNCTIONAL ASSESSMENT
PAIN_FUNCTIONAL_ASSESSMENT: NONE - DENIES PAIN
PAIN_FUNCTIONAL_ASSESSMENT: 0-10

## 2024-06-19 ASSESSMENT — LIFESTYLE VARIABLES
HOW MANY STANDARD DRINKS CONTAINING ALCOHOL DO YOU HAVE ON A TYPICAL DAY: PATIENT DOES NOT DRINK
HOW OFTEN DO YOU HAVE A DRINK CONTAINING ALCOHOL: NEVER

## 2024-06-19 ASSESSMENT — PAIN DESCRIPTION - LOCATION: LOCATION: GROIN

## 2024-06-19 ASSESSMENT — PAIN SCALES - GENERAL: PAINLEVEL_OUTOF10: 6

## 2024-06-19 NOTE — ED TRIAGE NOTES
Patient presents to the ED ambulatory from Triage. Patient states that he has had scrotal swelling for the past three days. Patient denies any urinary symptoms. Patient is in NAD, respirations are even and unlabored, bed in lowest position and call light with in reach. Resident is bedside for evaluation. Writer will continue with plan of care.

## 2024-06-19 NOTE — ED PROVIDER NOTES
[facility] ED  Emergency Department Encounter  Emergency Medicine Resident     Pt Name:Jaya Staley  MRN: [id number]  Birthdate 1970  Date of evaluation: 24  PCP:  Kay Warner APRN - CNP  Note Started: 1:11 PM EDT      CHIEF COMPLAINT       Chief Complaint   Patient presents with    Groin Swelling       HISTORY OF PRESENT ILLNESS  (Location/Symptom, Timing/Onset, Context/Setting, Quality, Duration, Modifying Factors, Severity.)      Jaya Staley is a 53 y.o. male who presents with testicular pain and swelling.  Patient states has been going on for the past 3 days.  Does have dysuria, no penile discharge, penile lesions or hematuria.  Patient has no abdominal pain, nausea, vomiting.  Patient states that he is sexually active and concern for STI.  Patient does have a past medical history significant for malignant neoplasm of the mouth.  Patient states that he is not having any complications regarding this.  Patient does have a G-tube and has had no problems with this as well.    PAST MEDICAL / SURGICAL / SOCIAL / FAMILY HISTORY      has a past medical history of Cancer (HCC), Colon cancer screening, Colon polyps, Constipation, Dysphagia, Uses feeding tube, and Weight loss.       has a past surgical history that includes Mandible reconstruction; Colonoscopy (2018); Inguinal hernia repair (Left); Colonoscopy (N/A, 2023); and Colonoscopy (N/A, 2023).      Social History     Socioeconomic History    Marital status: Single     Spouse name: Not on file    Number of children: Not on file    Years of education: Not on file    Highest education level: Not on file   Occupational History    Not on file   Tobacco Use    Smoking status: Former     Current packs/day: 0.00     Average packs/day: 0.3 packs/day for 15.0 years (3.8 ttl pk-yrs)     Types: Cigarettes     Start date: 1990     Quit date: 2005     Years since quittin.4    Smokeless tobacco: Never   Vaping 
[facility]  FACULTY HANDOFF       Handoff taken on the following patient from prior Attending Physician:  Pt Name: Jaya Luís  PCP:  Kay Warner, QIAN - CNP    Attestation  I was available and discussed any additional care issues that arose and coordinated the management plans with the resident(s) caring for the patient during my duty period. Any areas of disagreement with resident's documentation of care or procedures are noted on the chart. I was personally present for the key portions of any/all procedures during my duty period. I have documented in the chart those procedures where I was not present during the key portions.           Augustin Encarnacion MD  06/19/24 [id number]    
oriented to person, place, and time.  appears well-developed and well-nourished.   HENT: no facial swelling or edema  Head: Normocephalic and atraumatic.   Eyes: Right eye exhibits no discharge. Left eye exhibits no discharge. No scleral icterus.   Neck: No JVD present. No tracheal deviation present.   Cardiovascular: pulses present in extremities  Pulmonary/Chest: Effort normal. No respiratory distress.   Musculoskeletal: Normal range of motion. exhibits no edema.   Neurological: they are alert and oriented to person, place, and time.  Skin: Skin is warm and dry.   Psychiatric: has a normal mood and affect.  behavior is normal.      Comments  Medical Decision Making  Amount and/or Complexity of Data Reviewed  Labs: ordered.  Radiology: ordered.    Will obtain ultrasound for testicular torsion will obtain STI test and will treat with STI treatment given the concerns for epididymitis           Tray Edmond DO, RDMS.  Attending Emergency Physician          Tray Edmond DO  06/19/24 8300

## 2024-06-19 NOTE — DISCHARGE INSTRUCTIONS
While you are in the emergency department you were diagnosed with epididymitis.  Please take your doxycycline twice a day for the next 7 days.    For pain use acetaminophen (Tylenol) or ibuprofen (Motrin / Advil), unless prescribed medications that have acetaminophen or ibuprofen (or similar medications) in it.  You can take over the counter acetaminophen tablets (1 - 2 tablets of the 500-mg strength every 6 hours) or ibuprofen tablets (2 tablets every 4 hours).    Do not have any sexual intercourse until the test results are completed in 2 - 3 days.   If your results come back positive for a STD then make sure that any of your sexual partners are treated before having intercourse with them.  The primary means of preventing STD transmission is with the use of condoms.    PLEASE RETURN TO THE EMERGENCY DEPARTMENT IMMEDIATELY for worsening symptoms, pain with urination, discharge from your penis, or if you develop any concerning symptoms such as: high fever not relieved by acetaminophen (Tylenol) and/or ibuprofen (Motrin / Advil), chills, shortness of breath, chest pain, feeling of your heart fluttering or racing, persistent nausea and/or vomiting, vomiting up blood, blood in your stool, loss of consciousness, numbness, weakness or tingling in the arms or legs or change in color of the extremities, changes in mental status, persistent headache, blurry vision loss of bladder / bowel control, unable to follow up with your physician, or other any other care or concern.

## 2024-06-20 ASSESSMENT — ENCOUNTER SYMPTOMS
NAUSEA: 0
VOMITING: 0

## 2024-06-21 NOTE — DISCHARGE SUMMARY
[facility]     Department of Internal Medicine - Critical Care Service    INPATIENT DISCHARGE SUMMARY      PATIENT IDENTIFICATION:  NAME:  Jaya Staley   :   1970  MRN:    [id number]     Acct:    [id number]   Admit Date:  6/15/2024  Discharge date:  2024  2:11 PM   Attending Provider: No att. providers found                                     Principal Problem:    Angio-edema  Resolved Problems:    * No resolved hospital problems. *       REASON FOR HOSPITALIZATION:   Chief Complaint   Patient presents with    Oral Swelling     Started this morning. 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  Patient was previously admission in the ICU for angioedema of the tongue.  Patient reported that he did not take anything he was allergic to.  He was admitted to ICU for close monitoring, he was saturating on room air, is CBC showed hemoglobin 14, WBC 11.  In the emergency room patient got 105 mg of IV Solu-Medrol, and Benadryl.  And he was started on daily steroids.  Next morning he was evaluated by ENT.  They ruled out the angioedema.  And deemed that the patient was okay for discharge.  Patient was discharged to home.     Consults:   none    Procedures:  -    Any Hospital Acquired Infections: -    PATIENT'S DISCHARGE CONDITION:      PATIENT/FAMILY INSTRUCTIONS:   Discharge Medication List as of 2024 12:59 PM        CONTINUE these medications which have NOT CHANGED    Details   senna (SENOKOT) 8.8 MG/5ML SYRP syrup 5 mLs by Per G Tube route 2 times daily, Disp-300 mL, R-0Normal           Activity: activity as tolerated    Diet: regular diet    Disposition: home    Follow-up:  today with Kay Warner APRN - CNP,  today     Electronically signed by Alejandro Heart MD on 2024 at 2:32 PM     Time Spent on

## 2024-07-17 NOTE — ED PROVIDER NOTES
[facility] ED  Emergency Department Encounter  Emergency Medicine Resident     Pt Name:Jaya Staley  MRN: [id number]  Birthdate 1970  Date of evaluation: 6/15/24  PCP:  Kay Warner APRN - CNP  Note Started: 6:25 PM EDT      CHIEF COMPLAINT       Chief Complaint   Patient presents with    Oral Swelling     Started this morning. Hx of cancer to jaw        HISTORY OF PRESENT ILLNESS  (Location/Symptom, Timing/Onset, Context/Setting, Quality, Duration, Modifying Factors, Severity.)      Jaya Staley is a 53 y.o. 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  Recently placed in our ICU for a similar presentation from 5/16/2024 to 5/18/2024 where anesthesia and ENT were consulted due to difficult airway.  ENT performed bedside laryngoscopy showing a swollen anterior tongue and relatively normal base of the tongue.  He was not prophylactically intubated and managed in the ICU's symptomatically.  He did receive anaphylaxis medications with Pepcid, Benadryl, IM epi, and Decadron in the ED.  Today, he is presenting in a similar fashion.  He denies abdominal pains, skin changes/rashes, chest pain, shortness of breath.  He denies stridor and wheezing however he does feel as if the front part of his tongue is swollen causing him to drool.  He is speaking in clear sentences, but does feel like his voice has changed.    PAST MEDICAL / SURGICAL / SOCIAL / FAMILY HISTORY      has a past medical history of Cancer (HCC), Colon cancer screening, Colon polyps, Constipation, Dysphagia, Uses feeding tube, and Weight loss.       has a past surgical history that includes Mandible reconstruction; Colonoscopy (11/26/2018); Inguinal hernia repair (Left); Colonoscopy (N/A, 8/28/2023); and Colonoscopy (N/A, 8/29/2023).      Social History     Socioeconomic History    
   [facility]     Emergency Department     Faculty Attestation    I performed a history and physical examination of the patient and discussed management with the resident. I reviewed the resident’s note and agree with the documented findings and plan of care. Any areas of disagreement are noted on the chart. I was personally present for the key portions of any procedures. I have documented in the chart those procedures where I was not present during the key portions. I have reviewed the emergency nurses triage note. I agree with the chief complaint, past medical history, past surgical history, allergies, medications, social and family history as documented unless otherwise noted below. Documentation of the HPI, Physical Exam and Medical Decision Making performed by medical students or scribes is based on my personal performance of the HPI, PE and MDM. For Physician Assistant/ Nurse Practitioner cases/documentation I have personally evaluated this patient and have completed at least one if not all key elements of the E/M (history, physical exam, and MDM). Additional findings are as noted.    Vital signs:   Vitals:    06/15/24 1822   BP:    Pulse: (!) 111   Resp: 16   Temp:    SpO2: 96%      53-year-old male presents with swelling of his tongue.  Patient has had a prior episode of a similar event.  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  He is not currently taking any medications including no ACE inhibitor's.  On exam, the patient has sequelae of his prior cancer in terms of his structure of his lower jaw.  The tongue is slightly protruding from the mouth.  The tip of the tongue to about group home back is firm to the touch and swollen.  The posterior aspect of the tongue is much softer.  No evidence of any swelling of the uvula.  No stridor.  Patient is wiping his secretions with a washcloth, but this is his baseline to an extent.  
no

## 2024-07-21 ENCOUNTER — APPOINTMENT (OUTPATIENT)
Dept: GENERAL RADIOLOGY | Age: 54
End: 2024-07-21
Payer: COMMERCIAL

## 2024-07-21 ENCOUNTER — HOSPITAL ENCOUNTER (EMERGENCY)
Age: 54
Discharge: HOME | End: 2024-07-21
Attending: EMERGENCY MEDICINE
Payer: COMMERCIAL

## 2024-07-21 VITALS
DIASTOLIC BLOOD PRESSURE: 72 MMHG | OXYGEN SATURATION: 97 % | WEIGHT: 175 LBS | BODY MASS INDEX: 25.84 KG/M2 | SYSTOLIC BLOOD PRESSURE: 115 MMHG | RESPIRATION RATE: 18 BRPM | TEMPERATURE: 97 F | HEART RATE: 81 BPM

## 2024-07-21 DIAGNOSIS — K94.20 COMPLICATION OF GASTROSTOMY TUBE (HCC): Primary | ICD-10-CM

## 2024-07-21 PROCEDURE — 74019 RADEX ABDOMEN 2 VIEWS: CPT

## 2024-07-21 PROCEDURE — 49450 REPLACE G/C TUBE PERC: CPT | Performed by: EMERGENCY MEDICINE

## 2024-07-21 PROCEDURE — 43762 RPLC GTUBE NO REVJ TRC: CPT

## 2024-07-21 PROCEDURE — 99283 EMERGENCY DEPT VISIT LOW MDM: CPT | Performed by: EMERGENCY MEDICINE

## 2024-07-21 NOTE — ED PROVIDER NOTES
[facility]  Emergency Department  Faculty Attestation     I performed a history and physical examination of the patient and discussed management with the resident. I reviewed the resident’s note and agree with the documented findings and plan of care. Any areas of disagreement are noted on the chart. I was personally present for the key portions of any procedures. I have documented in the chart those procedures where I was not present during the key portions. I have reviewed the emergency nurses triage note. I agree with the chief complaint, past medical history, past surgical history, allergies, medications, social and family history as documented unless otherwise noted below.    For Physician Assistant/ Nurse Practitioner cases/documentation I have personally evaluated this patient and have completed at least one if not all key elements of the E/M (history, physical exam, and MDM). Additional findings are as noted.    Preliminary note started at 1:04 PM EDT    Primary Care Physician:  Kay Warner, APRN - CNP    Screenings:  [unfilled]    CHIEF COMPLAINT       Chief Complaint   Patient presents with    G Tube Complications     Pt states that his G Tube fell out this morning. Pt states that he woke up and it was out.       RECENT VITALS:   /72   Pulse 81   Temp 97 °F (36.1 °C) (Oral)   Resp 18   Wt 79.4 kg (175 lb)   SpO2 97%   BMI 25.84 kg/m²     LABS:  Labs Reviewed - No data to display    Radiology  No orders to display       Attending Physician Additional  Notes    Patient is dependent on G-tube feedings due to prior oral surgery for throat cancer.  His G-tube fell out at 6:00 last evening.  He has no other complaints.  He does feel thirsty.  On exam is nontoxic afebrile vital signs normal.  Abdomen is benign.  G-tube os appears open with no active bleeding and no surrounding infection.  Plan is G-tube replacement and confirmation by  imaging.            Denzel Leary MD, FACEP  Attending Emergency  Physician                Denzel Leary MD  07/21/24 6589

## 2024-07-21 NOTE — ED PROVIDER NOTES
[facility] ED  Emergency Department Encounter  Emergency Medicine Resident     Pt Name:Jaya Staley  MRN: [id number]  Birthdate 1970  Date of evaluation: 7/21/24  PCP:  Kay Warner APRN - CNP  Note Started: 12:24 PM EDT      CHIEF COMPLAINT       Chief Complaint   Patient presents with    G Tube Complications     Pt states that his G Tube fell out this morning. Pt states that he woke up and it was out.       HISTORY OF PRESENT ILLNESS  (Location/Symptom, Timing/Onset, Context/Setting, Quality, Duration, Modifying Factors, Severity.)      Jaya Staley is a 53-year-old male who presents to the ED after he found his G-tube had been pulled out.  Patient has had a G-tube since 2008 following cancer of his lower jaw and mouth that required surgical resection.  Patient has been seen in this ED previously for similar complaint due to G-tube malfunction.  He has a well-established tract.  Patient denies any nausea or vomiting, abdominal pain, change in bowel movements or decreased in flatus.    Patient has not 18 [place] G-tube    PAST MEDICAL / SURGICAL / SOCIAL / FAMILY HISTORY      has a past medical history of Cancer (HCC), Colon cancer screening, Colon polyps, Constipation, Dysphagia, Uses feeding tube, and Weight loss.       has a past surgical history that includes Mandible reconstruction; Colonoscopy (11/26/2018); Inguinal hernia repair (Left); Colonoscopy (N/A, 8/28/2023); and Colonoscopy (N/A, 8/29/2023).      Social History     Socioeconomic History    Marital status: Single     Spouse name: Not on file    Number of children: Not on file    Years of education: Not on file    Highest education level: Not on file   Occupational History    Not on file   Tobacco Use    Smoking status: Former     Current packs/day: 0.00     Average packs/day: 0.3 packs/day for 15.0 years (3.8 ttl pk-yrs)     Types: Cigarettes     Start date: 1/1/1990     Quit date: 1/1/2005     Years since quitting:

## 2024-07-21 NOTE — ED NOTES
Pt to ed from home, reports g tube fell out. Reports woke up this morning and it was out, denies any pain. Patient has hx of oral cancer with multiple surgeries, can not swallow or take anything by mouth. Patient is aox4, non labored RR, ambulatory from triage.

## 2024-07-21 NOTE — DISCHARGE INSTRUCTIONS
-You were seen and evaluated by emergency medicine physicians at [facility].    -Please follow-up with your primary care physician and/or with the referrals to specialist.    -You were diagnosed with: G-tube issue    -Your G-tube was replaced twice with the correct 1 on the second attempt.  X-ray imaging showed it visualized within the stomach.  There was noted gastric juice coming up the G-tube line.  Please use as indicated.  -If your G-tube falls out in the future, please bring it to the ED with you so that it can be matched.    -Please return to the Emergency Department if you are experiencing the following symptoms acutely:  Headache, fever, chills, nausea, vomiting, chest pain, shortness of breath, abdominal pain, change with urination, change with bowel movements, change in your skin/hair/nail, weakness, fatigue, altered mental status and/or any change from baseline health.    -Thank you for coming to [facility].

## 2025-04-10 ENCOUNTER — HOSPITAL ENCOUNTER (EMERGENCY)
Age: 55
Discharge: HOME | End: 2025-04-10
Attending: EMERGENCY MEDICINE
Payer: COMMERCIAL

## 2025-04-10 ENCOUNTER — APPOINTMENT (OUTPATIENT)
Dept: GENERAL RADIOLOGY | Age: 55
End: 2025-04-10
Payer: COMMERCIAL

## 2025-04-10 VITALS
RESPIRATION RATE: 18 BRPM | WEIGHT: 175 LBS | BODY MASS INDEX: 25.92 KG/M2 | DIASTOLIC BLOOD PRESSURE: 66 MMHG | HEIGHT: 69 IN | HEART RATE: 60 BPM | OXYGEN SATURATION: 100 % | TEMPERATURE: 97.3 F | SYSTOLIC BLOOD PRESSURE: 117 MMHG

## 2025-04-10 DIAGNOSIS — T85.528A DISLODGED GASTROSTOMY TUBE: Primary | ICD-10-CM

## 2025-04-10 PROCEDURE — 99283 EMERGENCY DEPT VISIT LOW MDM: CPT

## 2025-04-10 PROCEDURE — 49465 FLUORO EXAM OF G/COLON TUBE: CPT

## 2025-04-10 PROCEDURE — 6360000004 HC RX CONTRAST MEDICATION

## 2025-04-10 RX ORDER — DIATRIZOATE MEGLUMINE AND DIATRIZOATE SODIUM 660; 100 MG/ML; MG/ML
30 SOLUTION ORAL; RECTAL
Status: DISCONTINUED | OUTPATIENT
Start: 2025-04-10 | End: 2025-04-10 | Stop reason: HOSPADM

## 2025-04-10 RX ADMIN — DIATRIZOATE MEGLUMINE AND DIATRIZOATE SODIUM 30 ML: 660; 100 LIQUID ORAL; RECTAL at 11:51

## 2025-04-10 ASSESSMENT — PAIN - FUNCTIONAL ASSESSMENT: PAIN_FUNCTIONAL_ASSESSMENT: NONE - DENIES PAIN

## 2025-04-10 ASSESSMENT — ENCOUNTER SYMPTOMS
SHORTNESS OF BREATH: 0
COUGH: 0
ABDOMINAL PAIN: 0

## 2025-04-10 NOTE — ED NOTES
Pt ambulated to ED 30 via triage.  Pt c/o G tube coming out this morning.  Pt states he did cover site when tube came out.  Pt denies any other concerns at this time.  Pt A&O x4, RR even and unlabored, call light within reach. Whiteboard updated. Will continue plan of care.

## 2025-04-10 NOTE — DISCHARGE INSTRUCTIONS
You were seen in the ER for a dislodged G tube. You G tube was replaced. Placement was confirmed with Xray imaging.     You need to call Kay Warner APRN - CNP to make an appointment as directed for follow up.    Take any medications as prescribed, if given any, otherwise for pain Use ibuprofen or Tylenol (unless prescribed medications that have Tylenol in it).  You can take over the counter Ibuprofen (advil) tablets (4 tablets every 8 hours or 3 tablets every 6 hours or 2 tablets every 4 hours)    PLEASE RETURN TO THE ED IMMEDIATELY for worsening symptoms, or if you develop any concerning symptoms such as: high fever not relieved by tylenol and/or motrin, chills, shortness of breath, chest pain, persistent nausea and/or vomiting, numbness, weakness or tingling in the arms or legs or change in color of the extremities, changes in mental status, persistent headache, blurry vision, inability to urinate, unable to follow up with your physician, or other any other  Care or concern.

## 2025-04-10 NOTE — ED PROVIDER NOTES
Alcohol use: Yes     Comment: rare    Drug use: Yes     Types: Marijuana (Weed)     Comment: at times    Sexual activity: Yes     Partners: Female   Other Topics Concern    Not on file   Social History Narrative    Not on file     Social Drivers of Health     Financial Resource Strain: Low Risk  (2/28/2024)    [facility]    Overall Financial Resource Strain (CARDIA)     Difficulty of Paying Living Expenses: Not very hard   Food Insecurity: No Food Insecurity (6/16/2024)    Hunger Vital Sign     Worried About Running Out of Food in the Last Year: Never true     Ran Out of Food in the Last Year: Never true   Transportation Needs: No Transportation Needs (6/16/2024)    PRAPARE - Transportation     Lack of Transportation (Medical): No     Lack of Transportation (Non-Medical): No   Physical Activity: Not on file   Stress: Not on file   Social Connections: Not on file   Intimate Partner Violence: Not on file   Housing Stability: Low Risk  (6/16/2024)    Housing Stability Vital Sign     Unable to Pay for Housing in the Last Year: No     Number of Places Lived in the Last Year: 1     Unstable Housing in the Last Year: No       Family History   Problem Relation Age of Onset    High Blood Pressure Mother     Heart Disease Father        Allergies:  Patient has no known allergies.    Home Medications:  Prior to Admission medications    Medication Sig Start Date End Date Taking? Authorizing Provider   senna (SENOKOT) 8.8 MG/5ML SYRP syrup 5 mLs by Per G Tube route 2 times daily  Patient not taking: Reported on 6/16/2024 5/18/24 6/17/24  Nico Johnson MD       REVIEW OF SYSTEMS       Review of Systems   Constitutional:  Negative for chills and fever.   Respiratory:  Negative for cough and shortness of breath.    Cardiovascular:  Negative for chest pain.   Gastrointestinal:  Negative for abdominal pain.   Genitourinary:  Negative for difficulty urinating.  Old tube type:  Gastrostomy    Old tube size:  16 Fr  Sedation:     Sedation type:  None  Procedure details:     Patient position:  Supine    Procedure type:  Replacement    Tube type:  Gastrostomy    Tube size:  16 Fr    Bulb inflation volume:  8 cc    Bulb inflation fluid:  Normal saline  Post-procedure details:     Placement/position confirmation:  Gastric contents aspirated, contrast and x-ray    Placement difficulty:  None    Bleeding:  None    Procedure completion:  Tolerated well, no immediate complications        CONSULTS:  None    CRITICAL CARE:  There was significant risk of life threatening deterioration of patient's condition requiring my direct management. Critical care time 0 minutes, excluding any documented procedures.    FINAL IMPRESSION      1. Dislodged gastrostomy tube          DISPOSITION / PLAN     DISPOSITION Decision To Discharge 04/10/2025 02:25:13 PM   DISPOSITION CONDITION Stable           PATIENT REFERRED TO:  [facility] Emergency Department  [facility] [address]  [phone]    As needed, If symptoms worsen    Kay Warner, APRN - CNP  [address] [id number]  [phone]      Please follow up with your PCP as soon as possible      DISCHARGE MEDICATIONS:  Discharge Medication List as of 4/10/2025  2:30 PM          Nico Johnson MD  Emergency Medicine Resident    (Please note that portions of thisnote were completed with a voice recognition program.  Efforts were made to edit the dictations but occasionally words are mis-transcribed.)

## 2025-04-10 NOTE — ED PROVIDER NOTES
[facility] EMERGENCY DEPARTMENT     Emergency Department     Faculty Attestation    I performed a history and physical examination of the patient and discussed management with the resident. I reviewed the resident’s note and agree with the documented findings and plan of care. Any areas of disagreement are noted on the chart. I was personally present for the key portions of any procedures. I have documented in the chart those procedures where I was not present during the key portions. I have reviewed the emergency nurses triage note. I agree with the chief complaint, past medical history, past surgical history, allergies, medications, social and family history as documented unless otherwise noted below. For Physician Assistant/ Nurse Practitioner cases/documentation I have personally evaluated this patient and have completed at least one if not all key elements of the E/M (history, physical exam, and MDM). Additional findings are as noted.    11:33 AM EDT    Patient presents after his G-tube accidentally pulled out this morning.  He says he has been out for about an hour and a half.  He says he has had the G-tube in the place for over 10 years.  He has no other complaints and states that he would not be here if the G-tube had not fallen out.  The resident and PA student were able to replace the G-tube without difficulty while I was in the room.  Will get an x-ray to confirm placement.      Radha Tan MD  Attending Emergency  Physician

## (undated) DEVICE — DEFENDO AIR WATER SUCTION AND BIOPSY VALVE KIT FOR  OLYMPUS: Brand: DEFENDO AIR/WATER/SUCTION AND BIOPSY VALVE

## (undated) DEVICE — CUFF, TOURNIQUET, 18 X 4, SNGL PORT/SNGL BLADDER, RED, STERILE

## (undated) DEVICE — Device

## (undated) DEVICE — ADAPTER, WATER BOTTLE, SMART CAP, 140/160/180 SERIES

## (undated) DEVICE — TRAY, SURESTEP, URINE METER, 14FR, SILICONE

## (undated) DEVICE — CATHETER, URETHRAL, PEZZER, 3 CM HEAD, 36 FR, LATEX

## (undated) DEVICE — CUP, SOLUTION

## (undated) DEVICE — NEEDLE, ELECTRODE, ELECTROSURGICAL, INSULATED

## (undated) DEVICE — COVER, CART, 45 X 27 X 48 IN, CLEAR

## (undated) DEVICE — MICROCLIPS, GEM HEMOSTATIC TITANIUM

## (undated) DEVICE — SPONGE, DISSECTOR, PEANUT, 3/8, STERILE 5 FOAM HOLDER"

## (undated) DEVICE — CATHETER, IV, INSYTE, AUTOGUARD, SHIELDED, 16 G X 1.16 IN, VIALON

## (undated) DEVICE — DRAIN, WOUND, FLAT, HUBLESS, FULL LENGTH PERFORATION, 10 MM X 20 CM, SILICONE

## (undated) DEVICE — DRESSING, ADHESIVE, ISLAND, TELFA, 4 X 14 IN

## (undated) DEVICE — STAY SET, SURGICAL, 5MM SHARP HOOK, 8PK

## (undated) DEVICE — BOWL, UTILITY, 32 OZ, PLASTIC, STERILE

## (undated) DEVICE — VALVE, SUCTION

## (undated) DEVICE — SPONGE, HEMOSTATIC, CELLULOSE, SURGICEL, 2 X 14 IN

## (undated) DEVICE — MANIFOLD, 4 PORT NEPTUNE STANDARD

## (undated) DEVICE — CATHETER TRAY, SURESTEP, 16FR, URINE METER W/STATLOCK

## (undated) DEVICE — CLIP, LIGATING, W/ADHESIVE, WIDE SLOT, SMALL, TITANIUM

## (undated) DEVICE — CLIP, LIGATING, W/ADHESIVE PAD, MEDIUM, TITANIUM

## (undated) DEVICE — PADDING, WEBRIL, UNDERCAST, STERILE, 4 IN

## (undated) DEVICE — ENDO KIT W/SYRINGE: Brand: MEDLINE INDUSTRIES, INC.

## (undated) DEVICE — BAG, DECANTER

## (undated) DEVICE — CATHETER, IV, ANGIOCATH, 24 G X 0.75 IN, FEP POLYMER

## (undated) DEVICE — GLOVE ORANGE PI 7   MSG9070

## (undated) DEVICE — SUTURE, PROLENE, 5-0, 18 IN, P3, BLUE

## (undated) DEVICE — STOCKINETTE, DOUBLE PLY, 4 X 48 IN, STERILE

## (undated) DEVICE — SUTURE, VICRYL, 3-0,18 IN, SH, UNDYED

## (undated) DEVICE — SUTURE, SILK, 2-0, 30 IN, SH, BLACK

## (undated) DEVICE — SPONGE, GAUZE, XRAY DECT, 16 PLY, 4 X 4, W/MASTER DMT,STERILE

## (undated) DEVICE — CATHETER, IV, INSYTE, AUTOGUARD, SHIELDED, 16 G X 1.75 IN, VIALON

## (undated) DEVICE — COVER PROBE, SOFT FLEX W/ GEL, 5 X 48 IN (13X122CM)

## (undated) DEVICE — REST, HEAD, BAGEL, 9 IN

## (undated) DEVICE — CANNULA, ANTERIOR CHAMBER, 27 GA

## (undated) DEVICE — COUNTER, NEEDLE, FOAM BLOCK, W/MAGNET, W/BLADE GUARD, 10 COUNT, RED, LF

## (undated) DEVICE — SUTURE, PERMA HAND 2-0, TAPER POINT, SH BLACK 8-18 INCH

## (undated) DEVICE — HOLSTER, JET SAFETY

## (undated) DEVICE — SUTURE, PLAIN, 5-0, 18 IN, PC1, YELLOW

## (undated) DEVICE — SPONGE, LAP, XRAY DECT, 12IN X 12IN, W/MASTER DMT, STERILE

## (undated) DEVICE — SUTURE, NYLON, 9-0, 10C33

## (undated) DEVICE — CLEANER, WIPE, INSTRUMENT, 3.25 X 3.25 IN

## (undated) DEVICE — TUBING, SUCTION, CONNECTING, STERILE 0.25 X 120 IN., LF

## (undated) DEVICE — SYRINGE, COLLECTION, ABG, 1CC, LUER LOCK

## (undated) DEVICE — VALVE, DEFENDO, 5 PCS BUTTON SET, SINGLE USE

## (undated) DEVICE — SPEAR, EYE, SURGICAL, WECK-CEL, CELLULOSE

## (undated) DEVICE — CANNULA, ANTERIOR CHAMBER

## (undated) DEVICE — BOWL, BASIN, 32 OZ, STERILE

## (undated) DEVICE — VALVE, BIOPSY, BRONCHOSCOPE, DISPOSABLE, STERILE

## (undated) DEVICE — DRESSING, GAUZE, 16 PLY, 4 X 4 IN, STERILE

## (undated) DEVICE — EVACUATOR, WOUND, SUCTION, CLOSED, JACKSON-PRATT, 100 CC, SILICONE

## (undated) DEVICE — SYRINGE, 6 CC, REG LUER TIP